# Patient Record
Sex: FEMALE | Race: BLACK OR AFRICAN AMERICAN | NOT HISPANIC OR LATINO | Employment: FULL TIME | URBAN - METROPOLITAN AREA
[De-identification: names, ages, dates, MRNs, and addresses within clinical notes are randomized per-mention and may not be internally consistent; named-entity substitution may affect disease eponyms.]

---

## 2021-10-05 ENCOUNTER — HOSPITAL ENCOUNTER (EMERGENCY)
Facility: HOSPITAL | Age: 56
Discharge: ANOTHER HOSPITAL | End: 2021-10-05
Attending: EMERGENCY MEDICINE
Payer: COMMERCIAL

## 2021-10-05 ENCOUNTER — HOSPITAL ENCOUNTER (INPATIENT)
Facility: HOSPITAL | Age: 56
LOS: 7 days | Discharge: HOME HEALTH | DRG: 871 | End: 2021-10-12
Attending: INTERNAL MEDICINE | Admitting: INTERNAL MEDICINE
Payer: COMMERCIAL

## 2021-10-05 VITALS
HEART RATE: 111 BPM | SYSTOLIC BLOOD PRESSURE: 104 MMHG | OXYGEN SATURATION: 96 % | RESPIRATION RATE: 16 BRPM | DIASTOLIC BLOOD PRESSURE: 58 MMHG | BODY MASS INDEX: 29.73 KG/M2 | TEMPERATURE: 99 F | WEIGHT: 172 LBS

## 2021-10-05 DIAGNOSIS — E87.6 HYPOKALEMIA: ICD-10-CM

## 2021-10-05 DIAGNOSIS — A41.9 SEPSIS: ICD-10-CM

## 2021-10-05 DIAGNOSIS — D69.6 THROMBOCYTOPENIA: ICD-10-CM

## 2021-10-05 DIAGNOSIS — R07.9 CHEST PAIN: ICD-10-CM

## 2021-10-05 DIAGNOSIS — R16.0 HEPATOMEGALY: ICD-10-CM

## 2021-10-05 DIAGNOSIS — E80.6 HYPERBILIRUBINEMIA: ICD-10-CM

## 2021-10-05 DIAGNOSIS — R39.89 PNEUMATURIA: ICD-10-CM

## 2021-10-05 DIAGNOSIS — D72.829 LEUKOCYTOSIS, UNSPECIFIED TYPE: ICD-10-CM

## 2021-10-05 DIAGNOSIS — D64.9 NORMOCYTIC ANEMIA: ICD-10-CM

## 2021-10-05 DIAGNOSIS — E87.20 LACTIC ACIDOSIS: ICD-10-CM

## 2021-10-05 DIAGNOSIS — R65.20 SEPSIS DUE TO ESCHERICHIA COLI WITH ACUTE RENAL FAILURE AND TUBULAR NECROSIS WITHOUT SEPTIC SHOCK: Primary | ICD-10-CM

## 2021-10-05 DIAGNOSIS — R19.5 HEME + STOOL: ICD-10-CM

## 2021-10-05 DIAGNOSIS — D64.9 ANEMIA, UNSPECIFIED TYPE: ICD-10-CM

## 2021-10-05 DIAGNOSIS — N17.9 AKI (ACUTE KIDNEY INJURY): ICD-10-CM

## 2021-10-05 DIAGNOSIS — I50.9 CHF (CONGESTIVE HEART FAILURE): ICD-10-CM

## 2021-10-05 DIAGNOSIS — Z72.0 TOBACCO ABUSE: ICD-10-CM

## 2021-10-05 DIAGNOSIS — I50.32 CHRONIC HEART FAILURE WITH PRESERVED EJECTION FRACTION: ICD-10-CM

## 2021-10-05 DIAGNOSIS — N17.0 SEPSIS DUE TO ESCHERICHIA COLI WITH ACUTE RENAL FAILURE AND TUBULAR NECROSIS WITHOUT SEPTIC SHOCK: Primary | ICD-10-CM

## 2021-10-05 DIAGNOSIS — I10 ESSENTIAL HYPERTENSION: ICD-10-CM

## 2021-10-05 DIAGNOSIS — I50.9 CONGESTIVE HEART FAILURE, UNSPECIFIED HF CHRONICITY, UNSPECIFIED HEART FAILURE TYPE: ICD-10-CM

## 2021-10-05 DIAGNOSIS — R74.8 ELEVATED ALKALINE PHOSPHATASE LEVEL: ICD-10-CM

## 2021-10-05 DIAGNOSIS — R06.02 SHORTNESS OF BREATH: ICD-10-CM

## 2021-10-05 DIAGNOSIS — N28.81 NEPHROMEGALY: ICD-10-CM

## 2021-10-05 DIAGNOSIS — A41.51 SEPSIS DUE TO ESCHERICHIA COLI WITH ACUTE RENAL FAILURE AND TUBULAR NECROSIS WITHOUT SEPTIC SHOCK: Primary | ICD-10-CM

## 2021-10-05 DIAGNOSIS — A41.9 SEPSIS, DUE TO UNSPECIFIED ORGANISM, UNSPECIFIED WHETHER ACUTE ORGAN DYSFUNCTION PRESENT: ICD-10-CM

## 2021-10-05 DIAGNOSIS — K57.92 DIVERTICULITIS: ICD-10-CM

## 2021-10-05 DIAGNOSIS — N17.0 ATN (ACUTE TUBULAR NECROSIS): ICD-10-CM

## 2021-10-05 DIAGNOSIS — N17.9 ACUTE KIDNEY INJURY: Primary | ICD-10-CM

## 2021-10-05 DIAGNOSIS — E66.09 CLASS 1 OBESITY DUE TO EXCESS CALORIES WITHOUT SERIOUS COMORBIDITY WITH BODY MASS INDEX (BMI) OF 30.0 TO 30.9 IN ADULT: ICD-10-CM

## 2021-10-05 DIAGNOSIS — R79.89 ELEVATED PROCALCITONIN: ICD-10-CM

## 2021-10-05 DIAGNOSIS — R06.02 SOB (SHORTNESS OF BREATH): ICD-10-CM

## 2021-10-05 PROBLEM — Z20.822 COVID-19 RULED OUT: Status: ACTIVE | Noted: 2021-10-05

## 2021-10-05 PROBLEM — R17 TOTAL BILIRUBIN, ELEVATED: Status: ACTIVE | Noted: 2021-10-05

## 2021-10-05 LAB
25(OH)D3+25(OH)D2 SERPL-MCNC: 19 NG/ML (ref 30–96)
ALBUMIN SERPL BCP-MCNC: 1.9 G/DL (ref 3.5–5.2)
ALBUMIN SERPL BCP-MCNC: 3.1 G/DL (ref 3.5–5.2)
ALP SERPL-CCNC: 179 U/L (ref 55–135)
ALP SERPL-CCNC: 185 U/L (ref 38–126)
ALT SERPL W/O P-5'-P-CCNC: 22 U/L (ref 10–44)
ALT SERPL W/O P-5'-P-CCNC: 25 U/L (ref 10–44)
ANION GAP SERPL CALC-SCNC: 16 MMOL/L (ref 8–16)
ANION GAP SERPL CALC-SCNC: 20 MMOL/L (ref 8–16)
ANISOCYTOSIS BLD QL SMEAR: SLIGHT
ANISOCYTOSIS BLD QL SMEAR: SLIGHT
APTT BLDCRRT: 33.2 SEC (ref 21–32)
AST SERPL-CCNC: 25 U/L (ref 10–40)
AST SERPL-CCNC: 44 U/L (ref 15–46)
BASOPHILS # BLD AUTO: ABNORMAL K/UL (ref 0–0.2)
BASOPHILS NFR BLD: 0 % (ref 0–1.9)
BASOPHILS NFR BLD: 0 % (ref 0–1.9)
BILIRUB SERPL-MCNC: 6.4 MG/DL (ref 0.1–1)
BILIRUB SERPL-MCNC: 7.9 MG/DL (ref 0.1–1)
BNP SERPL-MCNC: 2116 PG/ML (ref 0–99)
BUN SERPL-MCNC: 50 MG/DL (ref 6–20)
CA-I BLDV-SCNC: 0.93 MMOL/L (ref 1.06–1.42)
CALCIUM SERPL-MCNC: 7.8 MG/DL (ref 8.7–10.5)
CALCIUM SERPL-MCNC: 8.4 MG/DL (ref 8.7–10.5)
CHLORIDE SERPL-SCNC: 96 MMOL/L (ref 95–110)
CHLORIDE SERPL-SCNC: 99 MMOL/L (ref 95–110)
CK SERPL-CCNC: 26 U/L (ref 20–180)
CO2 SERPL-SCNC: 20 MMOL/L (ref 23–29)
CO2 SERPL-SCNC: 25 MMOL/L (ref 23–29)
CREAT SERPL-MCNC: 3.4 MG/DL (ref 0.5–1.4)
CREAT SERPL-MCNC: 3.77 MG/DL (ref 0.5–1.4)
CRP SERPL-MCNC: 468.8 MG/L (ref 0–8.2)
D DIMER PPP IA.FEU-MCNC: >33 MG/L FEU
DIFFERENTIAL METHOD: ABNORMAL
DIFFERENTIAL METHOD: ABNORMAL
EOSINOPHIL # BLD AUTO: ABNORMAL K/UL (ref 0–0.5)
EOSINOPHIL NFR BLD: 0 % (ref 0–8)
EOSINOPHIL NFR BLD: 0 % (ref 0–8)
ERYTHROCYTE [DISTWIDTH] IN BLOOD BY AUTOMATED COUNT: 14.7 % (ref 11.5–14.5)
ERYTHROCYTE [DISTWIDTH] IN BLOOD BY AUTOMATED COUNT: 15.3 % (ref 11.5–14.5)
EST. GFR  (AFRICAN AMERICAN): 14.5 ML/MIN/1.73 M^2
EST. GFR  (AFRICAN AMERICAN): 16.5 ML/MIN/1.73 M^2
EST. GFR  (NON AFRICAN AMERICAN): 12.6 ML/MIN/1.73 M^2
EST. GFR  (NON AFRICAN AMERICAN): 14.3 ML/MIN/1.73 M^2
FERRITIN SERPL-MCNC: 1413 NG/ML (ref 20–300)
FOLATE SERPL-MCNC: 5.1 NG/ML (ref 4–24)
GLUCOSE SERPL-MCNC: 53 MG/DL (ref 70–110)
GLUCOSE SERPL-MCNC: 72 MG/DL (ref 70–110)
HAPTOGLOB SERPL-MCNC: 372 MG/DL (ref 30–250)
HCT VFR BLD AUTO: 22.4 % (ref 37–48.5)
HCT VFR BLD AUTO: 23.5 % (ref 37–48.5)
HGB BLD-MCNC: 7.2 G/DL (ref 12–16)
HGB BLD-MCNC: 7.7 G/DL (ref 12–16)
HYPOCHROMIA BLD QL SMEAR: ABNORMAL
IMM GRANULOCYTES # BLD AUTO: ABNORMAL K/UL (ref 0–0.04)
IMM GRANULOCYTES # BLD AUTO: ABNORMAL K/UL (ref 0–0.04)
IMM GRANULOCYTES NFR BLD AUTO: ABNORMAL % (ref 0–0.5)
IMM GRANULOCYTES NFR BLD AUTO: ABNORMAL % (ref 0–0.5)
INFLUENZA A, MOLECULAR: NEGATIVE
INFLUENZA B, MOLECULAR: NEGATIVE
INR PPP: 1.5 (ref 0.8–1.2)
IRON SERPL-MCNC: 11 UG/DL (ref 30–160)
LACTATE SERPL-SCNC: 1.7 MMOL/L (ref 0.5–2.2)
LACTATE SERPL-SCNC: 3 MMOL/L (ref 0.5–2.2)
LDH SERPL L TO P-CCNC: 377 U/L (ref 110–260)
LYMPHOCYTES # BLD AUTO: ABNORMAL K/UL (ref 1–4.8)
LYMPHOCYTES NFR BLD: 11 % (ref 18–48)
LYMPHOCYTES NFR BLD: 4 % (ref 18–48)
MAGNESIUM SERPL-MCNC: 2.2 MG/DL (ref 1.6–2.6)
MCH RBC QN AUTO: 27 PG (ref 27–31)
MCH RBC QN AUTO: 27.4 PG (ref 27–31)
MCHC RBC AUTO-ENTMCNC: 32.1 G/DL (ref 32–36)
MCHC RBC AUTO-ENTMCNC: 32.8 G/DL (ref 32–36)
MCV RBC AUTO: 84 FL (ref 82–98)
MCV RBC AUTO: 84 FL (ref 82–98)
METAMYELOCYTES NFR BLD MANUAL: 1 %
METAMYELOCYTES NFR BLD MANUAL: 2 %
MONOCYTES # BLD AUTO: ABNORMAL K/UL (ref 0.3–1)
MONOCYTES NFR BLD: 4 % (ref 4–15)
MONOCYTES NFR BLD: 5 % (ref 4–15)
MYELOCYTES NFR BLD MANUAL: 1 %
NEUTROPHILS NFR BLD: 79 % (ref 38–73)
NEUTROPHILS NFR BLD: 83 % (ref 38–73)
NEUTS BAND NFR BLD MANUAL: 2 %
NEUTS BAND NFR BLD MANUAL: 8 %
NRBC BLD-RTO: 0 /100 WBC
NRBC BLD-RTO: 0 /100 WBC
NT-PROBNP SERPL-MCNC: ABNORMAL PG/ML (ref 5–900)
OVALOCYTES BLD QL SMEAR: ABNORMAL
PHOSPHATE SERPL-MCNC: 3.5 MG/DL (ref 2.7–4.5)
PLATELET # BLD AUTO: 142 K/UL (ref 150–450)
PLATELET # BLD AUTO: 145 K/UL (ref 150–450)
PLATELET BLD QL SMEAR: ABNORMAL
PLATELET BLD QL SMEAR: ABNORMAL
PMV BLD AUTO: 11.3 FL (ref 9.2–12.9)
PMV BLD AUTO: 11.3 FL (ref 9.2–12.9)
POIKILOCYTOSIS BLD QL SMEAR: SLIGHT
POLYCHROMASIA BLD QL SMEAR: ABNORMAL
POTASSIUM SERPL-SCNC: 3 MMOL/L (ref 3.5–5.1)
POTASSIUM SERPL-SCNC: 3.2 MMOL/L (ref 3.5–5.1)
PROCALCITONIN SERPL IA-MCNC: 283.85 NG/ML
PROT SERPL-MCNC: 6.9 G/DL (ref 6–8.4)
PROT SERPL-MCNC: 7.3 G/DL (ref 6–8.4)
PROTHROMBIN TIME: 16.2 SEC (ref 9–12.5)
RBC # BLD AUTO: 2.67 M/UL (ref 4–5.4)
RBC # BLD AUTO: 2.81 M/UL (ref 4–5.4)
RETICS/RBC NFR AUTO: 1.1 % (ref 0.5–2.5)
SARS-COV-2 RDRP RESP QL NAA+PROBE: NEGATIVE
SARS-COV-2 RDRP RESP QL NAA+PROBE: NEGATIVE
SARS-COV-2 RNA RESP QL NAA+PROBE: NOT DETECTED
SATURATED IRON: 5 % (ref 20–50)
SODIUM SERPL-SCNC: 136 MMOL/L (ref 136–145)
SODIUM SERPL-SCNC: 140 MMOL/L (ref 136–145)
SPECIMEN SOURCE: NORMAL
TOTAL IRON BINDING CAPACITY: 231 UG/DL (ref 250–450)
TOXIC GRANULES BLD QL SMEAR: PRESENT
TRANSFERRIN SERPL-MCNC: 156 MG/DL (ref 200–375)
TROPONIN I SERPL DL<=0.01 NG/ML-MCNC: 0.03 NG/ML (ref 0–0.03)
TROPONIN I SERPL-MCNC: 0.02 NG/ML (ref 0.01–0.03)
TROPONIN I SERPL-MCNC: 0.03 NG/ML (ref 0.01–0.03)
TSH SERPL DL<=0.005 MIU/L-ACNC: 0.95 UIU/ML (ref 0.4–4)
UUN UR-MCNC: 46 MG/DL (ref 7–17)
VIT B12 SERPL-MCNC: >2000 PG/ML (ref 210–950)
WBC # BLD AUTO: 28.64 K/UL (ref 3.9–12.7)
WBC # BLD AUTO: 28.96 K/UL (ref 3.9–12.7)
WBC TOXIC VACUOLES BLD QL SMEAR: PRESENT

## 2021-10-05 PROCEDURE — 25000003 PHARM REV CODE 250: Performed by: INTERNAL MEDICINE

## 2021-10-05 PROCEDURE — 82330 ASSAY OF CALCIUM: CPT | Performed by: INTERNAL MEDICINE

## 2021-10-05 PROCEDURE — U0005 INFEC AGEN DETEC AMPLI PROBE: HCPCS | Performed by: INTERNAL MEDICINE

## 2021-10-05 PROCEDURE — 25000003 PHARM REV CODE 250: Performed by: FAMILY MEDICINE

## 2021-10-05 PROCEDURE — U0003 INFECTIOUS AGENT DETECTION BY NUCLEIC ACID (DNA OR RNA); SEVERE ACUTE RESPIRATORY SYNDROME CORONAVIRUS 2 (SARS-COV-2) (CORONAVIRUS DISEASE [COVID-19]), AMPLIFIED PROBE TECHNIQUE, MAKING USE OF HIGH THROUGHPUT TECHNOLOGIES AS DESCRIBED BY CMS-2020-01-R: HCPCS | Performed by: INTERNAL MEDICINE

## 2021-10-05 PROCEDURE — 83615 LACTATE (LD) (LDH) ENZYME: CPT | Performed by: INTERNAL MEDICINE

## 2021-10-05 PROCEDURE — 85027 COMPLETE CBC AUTOMATED: CPT | Mod: ER | Performed by: PHYSICIAN ASSISTANT

## 2021-10-05 PROCEDURE — 99285 EMERGENCY DEPT VISIT HI MDM: CPT | Mod: 25,ER

## 2021-10-05 PROCEDURE — 84443 ASSAY THYROID STIM HORMONE: CPT | Performed by: INTERNAL MEDICINE

## 2021-10-05 PROCEDURE — 27000207 HC ISOLATION

## 2021-10-05 PROCEDURE — U0002 COVID-19 LAB TEST NON-CDC: HCPCS | Mod: 91 | Performed by: INTERNAL MEDICINE

## 2021-10-05 PROCEDURE — 84484 ASSAY OF TROPONIN QUANT: CPT | Mod: 91,ER | Performed by: PHYSICIAN ASSISTANT

## 2021-10-05 PROCEDURE — 93010 ELECTROCARDIOGRAM REPORT: CPT | Mod: ,,, | Performed by: INTERNAL MEDICINE

## 2021-10-05 PROCEDURE — 80053 COMPREHEN METABOLIC PANEL: CPT | Mod: ER | Performed by: PHYSICIAN ASSISTANT

## 2021-10-05 PROCEDURE — 25000242 PHARM REV CODE 250 ALT 637 W/ HCPCS: Performed by: INTERNAL MEDICINE

## 2021-10-05 PROCEDURE — 87077 CULTURE AEROBIC IDENTIFY: CPT | Mod: ER | Performed by: PHYSICIAN ASSISTANT

## 2021-10-05 PROCEDURE — 83735 ASSAY OF MAGNESIUM: CPT | Performed by: INTERNAL MEDICINE

## 2021-10-05 PROCEDURE — 93010 EKG 12-LEAD: ICD-10-PCS | Mod: ,,, | Performed by: INTERNAL MEDICINE

## 2021-10-05 PROCEDURE — 85045 AUTOMATED RETICULOCYTE COUNT: CPT | Performed by: INTERNAL MEDICINE

## 2021-10-05 PROCEDURE — 93005 ELECTROCARDIOGRAM TRACING: CPT | Mod: ER

## 2021-10-05 PROCEDURE — 83010 ASSAY OF HAPTOGLOBIN QUANT: CPT | Performed by: INTERNAL MEDICINE

## 2021-10-05 PROCEDURE — 63600175 PHARM REV CODE 636 W HCPCS: Performed by: INTERNAL MEDICINE

## 2021-10-05 PROCEDURE — 83605 ASSAY OF LACTIC ACID: CPT | Mod: ER | Performed by: PHYSICIAN ASSISTANT

## 2021-10-05 PROCEDURE — U0002 COVID-19 LAB TEST NON-CDC: HCPCS | Mod: ER | Performed by: EMERGENCY MEDICINE

## 2021-10-05 PROCEDURE — 80074 ACUTE HEPATITIS PANEL: CPT | Performed by: INTERNAL MEDICINE

## 2021-10-05 PROCEDURE — 99900035 HC TECH TIME PER 15 MIN (STAT): Mod: ER

## 2021-10-05 PROCEDURE — 94761 N-INVAS EAR/PLS OXIMETRY MLT: CPT

## 2021-10-05 PROCEDURE — 82728 ASSAY OF FERRITIN: CPT | Performed by: INTERNAL MEDICINE

## 2021-10-05 PROCEDURE — 25000003 PHARM REV CODE 250: Mod: ER | Performed by: PHYSICIAN ASSISTANT

## 2021-10-05 PROCEDURE — 85730 THROMBOPLASTIN TIME PARTIAL: CPT | Performed by: INTERNAL MEDICINE

## 2021-10-05 PROCEDURE — 82746 ASSAY OF FOLIC ACID SERUM: CPT | Performed by: INTERNAL MEDICINE

## 2021-10-05 PROCEDURE — 99223 1ST HOSP IP/OBS HIGH 75: CPT | Mod: ,,, | Performed by: INTERNAL MEDICINE

## 2021-10-05 PROCEDURE — 83880 ASSAY OF NATRIURETIC PEPTIDE: CPT | Mod: ER | Performed by: PHYSICIAN ASSISTANT

## 2021-10-05 PROCEDURE — 85007 BL SMEAR W/DIFF WBC COUNT: CPT | Mod: 91 | Performed by: INTERNAL MEDICINE

## 2021-10-05 PROCEDURE — 96365 THER/PROPH/DIAG IV INF INIT: CPT | Mod: ER

## 2021-10-05 PROCEDURE — 85610 PROTHROMBIN TIME: CPT | Performed by: INTERNAL MEDICINE

## 2021-10-05 PROCEDURE — 84100 ASSAY OF PHOSPHORUS: CPT | Performed by: INTERNAL MEDICINE

## 2021-10-05 PROCEDURE — 36415 COLL VENOUS BLD VENIPUNCTURE: CPT | Performed by: INTERNAL MEDICINE

## 2021-10-05 PROCEDURE — 84484 ASSAY OF TROPONIN QUANT: CPT | Mod: 91 | Performed by: INTERNAL MEDICINE

## 2021-10-05 PROCEDURE — 84145 PROCALCITONIN (PCT): CPT | Performed by: INTERNAL MEDICINE

## 2021-10-05 PROCEDURE — 83880 ASSAY OF NATRIURETIC PEPTIDE: CPT | Mod: 91 | Performed by: INTERNAL MEDICINE

## 2021-10-05 PROCEDURE — 87502 INFLUENZA DNA AMP PROBE: CPT | Performed by: INTERNAL MEDICINE

## 2021-10-05 PROCEDURE — 85007 BL SMEAR W/DIFF WBC COUNT: CPT | Mod: ER | Performed by: PHYSICIAN ASSISTANT

## 2021-10-05 PROCEDURE — 82306 VITAMIN D 25 HYDROXY: CPT | Performed by: INTERNAL MEDICINE

## 2021-10-05 PROCEDURE — 82607 VITAMIN B-12: CPT | Performed by: INTERNAL MEDICINE

## 2021-10-05 PROCEDURE — 63600175 PHARM REV CODE 636 W HCPCS: Mod: ER | Performed by: PHYSICIAN ASSISTANT

## 2021-10-05 PROCEDURE — 85027 COMPLETE CBC AUTOMATED: CPT | Mod: 91 | Performed by: INTERNAL MEDICINE

## 2021-10-05 PROCEDURE — 96375 TX/PRO/DX INJ NEW DRUG ADDON: CPT | Mod: ER

## 2021-10-05 PROCEDURE — 85379 FIBRIN DEGRADATION QUANT: CPT | Mod: ER | Performed by: EMERGENCY MEDICINE

## 2021-10-05 PROCEDURE — 84466 ASSAY OF TRANSFERRIN: CPT | Performed by: INTERNAL MEDICINE

## 2021-10-05 PROCEDURE — 86140 C-REACTIVE PROTEIN: CPT | Performed by: INTERNAL MEDICINE

## 2021-10-05 PROCEDURE — 87040 BLOOD CULTURE FOR BACTERIA: CPT | Mod: ER | Performed by: PHYSICIAN ASSISTANT

## 2021-10-05 PROCEDURE — 99223 PR INITIAL HOSPITAL CARE,LEVL III: ICD-10-PCS | Mod: ,,, | Performed by: INTERNAL MEDICINE

## 2021-10-05 PROCEDURE — 93005 ELECTROCARDIOGRAM TRACING: CPT

## 2021-10-05 PROCEDURE — 87186 SC STD MICRODIL/AGAR DIL: CPT | Mod: ER | Performed by: PHYSICIAN ASSISTANT

## 2021-10-05 PROCEDURE — 83605 ASSAY OF LACTIC ACID: CPT | Mod: 91 | Performed by: INTERNAL MEDICINE

## 2021-10-05 PROCEDURE — 82550 ASSAY OF CK (CPK): CPT | Performed by: INTERNAL MEDICINE

## 2021-10-05 PROCEDURE — 80053 COMPREHEN METABOLIC PANEL: CPT | Mod: 91 | Performed by: INTERNAL MEDICINE

## 2021-10-05 PROCEDURE — 20600001 HC STEP DOWN PRIVATE ROOM

## 2021-10-05 PROCEDURE — 94640 AIRWAY INHALATION TREATMENT: CPT

## 2021-10-05 RX ORDER — IBUPROFEN 200 MG
16 TABLET ORAL
Status: DISCONTINUED | OUTPATIENT
Start: 2021-10-05 | End: 2021-10-12 | Stop reason: HOSPADM

## 2021-10-05 RX ORDER — MUPIROCIN 20 MG/G
OINTMENT TOPICAL 2 TIMES DAILY
Status: DISPENSED | OUTPATIENT
Start: 2021-10-05 | End: 2021-10-10

## 2021-10-05 RX ORDER — SODIUM CHLORIDE 0.9 % (FLUSH) 0.9 %
5 SYRINGE (ML) INJECTION
Status: DISCONTINUED | OUTPATIENT
Start: 2021-10-05 | End: 2021-10-12 | Stop reason: HOSPADM

## 2021-10-05 RX ORDER — MORPHINE SULFATE 4 MG/ML
2 INJECTION, SOLUTION INTRAMUSCULAR; INTRAVENOUS
Status: COMPLETED | OUTPATIENT
Start: 2021-10-05 | End: 2021-10-05

## 2021-10-05 RX ORDER — TIZANIDINE 4 MG/1
4 TABLET ORAL EVERY 8 HOURS PRN
Status: DISCONTINUED | OUTPATIENT
Start: 2021-10-05 | End: 2021-10-09

## 2021-10-05 RX ORDER — CALCIUM CARBONATE 200(500)MG
1000 TABLET,CHEWABLE ORAL 2 TIMES DAILY
Status: DISCONTINUED | OUTPATIENT
Start: 2021-10-05 | End: 2021-10-12 | Stop reason: HOSPADM

## 2021-10-05 RX ORDER — GLUCAGON 1 MG
1 KIT INJECTION
Status: DISCONTINUED | OUTPATIENT
Start: 2021-10-05 | End: 2021-10-12 | Stop reason: HOSPADM

## 2021-10-05 RX ORDER — SODIUM CHLORIDE, SODIUM LACTATE, POTASSIUM CHLORIDE, CALCIUM CHLORIDE 600; 310; 30; 20 MG/100ML; MG/100ML; MG/100ML; MG/100ML
INJECTION, SOLUTION INTRAVENOUS CONTINUOUS
Status: DISCONTINUED | OUTPATIENT
Start: 2021-10-05 | End: 2021-10-05

## 2021-10-05 RX ORDER — ASCORBIC ACID 500 MG
500 TABLET ORAL 2 TIMES DAILY
Status: DISCONTINUED | OUTPATIENT
Start: 2021-10-05 | End: 2021-10-12 | Stop reason: HOSPADM

## 2021-10-05 RX ORDER — ACETAMINOPHEN 325 MG/1
650 TABLET ORAL EVERY 4 HOURS PRN
Status: DISCONTINUED | OUTPATIENT
Start: 2021-10-05 | End: 2021-10-12 | Stop reason: HOSPADM

## 2021-10-05 RX ORDER — ALBUTEROL SULFATE 90 UG/1
2 AEROSOL, METERED RESPIRATORY (INHALATION) EVERY 6 HOURS
Status: DISCONTINUED | OUTPATIENT
Start: 2021-10-05 | End: 2021-10-11

## 2021-10-05 RX ORDER — TALC
6 POWDER (GRAM) TOPICAL NIGHTLY PRN
Status: DISCONTINUED | OUTPATIENT
Start: 2021-10-05 | End: 2021-10-12 | Stop reason: HOSPADM

## 2021-10-05 RX ORDER — POTASSIUM CHLORIDE 20 MEQ/1
40 TABLET, EXTENDED RELEASE ORAL ONCE
Status: COMPLETED | OUTPATIENT
Start: 2021-10-05 | End: 2021-10-05

## 2021-10-05 RX ORDER — ONDANSETRON 2 MG/ML
4 INJECTION INTRAMUSCULAR; INTRAVENOUS EVERY 8 HOURS PRN
Status: DISCONTINUED | OUTPATIENT
Start: 2021-10-05 | End: 2021-10-12 | Stop reason: HOSPADM

## 2021-10-05 RX ORDER — POLYETHYLENE GLYCOL 3350 17 G/17G
17 POWDER, FOR SOLUTION ORAL 2 TIMES DAILY PRN
Status: DISCONTINUED | OUTPATIENT
Start: 2021-10-05 | End: 2021-10-12 | Stop reason: HOSPADM

## 2021-10-05 RX ORDER — ONDANSETRON 2 MG/ML
4 INJECTION INTRAMUSCULAR; INTRAVENOUS
Status: COMPLETED | OUTPATIENT
Start: 2021-10-05 | End: 2021-10-05

## 2021-10-05 RX ORDER — IBUPROFEN 200 MG
24 TABLET ORAL
Status: DISCONTINUED | OUTPATIENT
Start: 2021-10-05 | End: 2021-10-12 | Stop reason: HOSPADM

## 2021-10-05 RX ORDER — GUAIFENESIN/DEXTROMETHORPHAN 100-10MG/5
10 SYRUP ORAL EVERY 4 HOURS PRN
Status: DISCONTINUED | OUTPATIENT
Start: 2021-10-05 | End: 2021-10-12 | Stop reason: HOSPADM

## 2021-10-05 RX ADMIN — SODIUM CHLORIDE, SODIUM LACTATE, POTASSIUM CHLORIDE, AND CALCIUM CHLORIDE 1000 ML: .6; .31; .03; .02 INJECTION, SOLUTION INTRAVENOUS at 05:10

## 2021-10-05 RX ADMIN — PIPERACILLIN AND TAZOBACTAM 4.5 G: 4; .5 INJECTION, POWDER, LYOPHILIZED, FOR SOLUTION INTRAVENOUS; PARENTERAL at 02:10

## 2021-10-05 RX ADMIN — TIZANIDINE 4 MG: 4 TABLET ORAL at 09:10

## 2021-10-05 RX ADMIN — OXYCODONE HYDROCHLORIDE AND ACETAMINOPHEN 500 MG: 500 TABLET ORAL at 09:10

## 2021-10-05 RX ADMIN — POTASSIUM CHLORIDE 40 MEQ: 1500 TABLET, EXTENDED RELEASE ORAL at 05:10

## 2021-10-05 RX ADMIN — ALBUTEROL SULFATE 2 PUFF: 108 INHALANT RESPIRATORY (INHALATION) at 09:10

## 2021-10-05 RX ADMIN — ACETAMINOPHEN 650 MG: 325 TABLET ORAL at 06:10

## 2021-10-05 RX ADMIN — MUPIROCIN 1 G: 20 OINTMENT TOPICAL at 09:10

## 2021-10-05 RX ADMIN — MORPHINE SULFATE 2 MG: 4 INJECTION INTRAVENOUS at 01:10

## 2021-10-05 RX ADMIN — CEFTRIAXONE 1 G: 1 INJECTION, SOLUTION INTRAVENOUS at 10:10

## 2021-10-05 RX ADMIN — CALCIUM CARBONATE (ANTACID) CHEW TAB 500 MG 1000 MG: 500 CHEW TAB at 09:10

## 2021-10-05 RX ADMIN — ONDANSETRON 4 MG: 2 INJECTION INTRAMUSCULAR; INTRAVENOUS at 01:10

## 2021-10-06 PROBLEM — Z20.822 COVID-19 RULED OUT: Status: RESOLVED | Noted: 2021-10-05 | Resolved: 2021-10-06

## 2021-10-06 LAB
ABO + RH BLD: NORMAL
ALBUMIN SERPL BCP-MCNC: 1.4 G/DL (ref 3.5–5.2)
ALBUMIN SERPL BCP-MCNC: 1.4 G/DL (ref 3.5–5.2)
ALP SERPL-CCNC: 137 U/L (ref 55–135)
ALP SERPL-CCNC: 159 U/L (ref 55–135)
ALT SERPL W/O P-5'-P-CCNC: 17 U/L (ref 10–44)
ALT SERPL W/O P-5'-P-CCNC: 18 U/L (ref 10–44)
ANION GAP SERPL CALC-SCNC: 16 MMOL/L (ref 8–16)
ANION GAP SERPL CALC-SCNC: 17 MMOL/L (ref 8–16)
ASCENDING AORTA: 2.47 CM
AST SERPL-CCNC: 20 U/L (ref 10–40)
AST SERPL-CCNC: 22 U/L (ref 10–40)
AV INDEX (PROSTH): 0.69
AV MEAN GRADIENT: 8 MMHG
AV PEAK GRADIENT: 12 MMHG
AV VALVE AREA: 2.1 CM2
AV VELOCITY RATIO: 0.65
BACTERIA #/AREA URNS AUTO: ABNORMAL /HPF
BILIRUB DIRECT SERPL-MCNC: 6.6 MG/DL (ref 0.1–0.3)
BILIRUB SERPL-MCNC: 10.6 MG/DL (ref 0.1–1)
BILIRUB SERPL-MCNC: 8.2 MG/DL (ref 0.1–1)
BILIRUB UR QL STRIP: NEGATIVE
BLD GP AB SCN CELLS X3 SERPL QL: NORMAL
BLD PROD TYP BPU: NORMAL
BLOOD UNIT EXPIRATION DATE: NORMAL
BLOOD UNIT TYPE CODE: 6200
BLOOD UNIT TYPE: NORMAL
BSA FOR ECHO PROCEDURE: 1.92 M2
BUN SERPL-MCNC: 62 MG/DL (ref 6–20)
BUN SERPL-MCNC: 63 MG/DL (ref 6–20)
C3 SERPL-MCNC: 102 MG/DL (ref 50–180)
C4 SERPL-MCNC: 33 MG/DL (ref 11–44)
CA-I BLDV-SCNC: 1.03 MMOL/L (ref 1.06–1.42)
CALCIUM SERPL-MCNC: 7.9 MG/DL (ref 8.7–10.5)
CALCIUM SERPL-MCNC: 8.2 MG/DL (ref 8.7–10.5)
CHLORIDE SERPL-SCNC: 101 MMOL/L (ref 95–110)
CHLORIDE SERPL-SCNC: 103 MMOL/L (ref 95–110)
CK SERPL-CCNC: 18 U/L (ref 20–180)
CLARITY UR REFRACT.AUTO: ABNORMAL
CO2 SERPL-SCNC: 20 MMOL/L (ref 23–29)
CO2 SERPL-SCNC: 20 MMOL/L (ref 23–29)
CODING SYSTEM: NORMAL
COLOR UR AUTO: YELLOW
CREAT SERPL-MCNC: 3.5 MG/DL (ref 0.5–1.4)
CREAT SERPL-MCNC: 3.7 MG/DL (ref 0.5–1.4)
CREAT UR-MCNC: 82 MG/DL (ref 15–325)
CREAT UR-MCNC: 82 MG/DL (ref 15–325)
CV ECHO LV RWT: 0.43 CM
DAT IGG-SP REAG RBC-IMP: NORMAL
DISPENSE STATUS: NORMAL
DOP CALC AO PEAK VEL: 1.75 M/S
DOP CALC AO VTI: 41.48 CM
DOP CALC LVOT AREA: 3 CM2
DOP CALC LVOT DIAMETER: 1.97 CM
DOP CALC LVOT PEAK VEL: 1.13 M/S
DOP CALC LVOT STROKE VOLUME: 87.01 CM3
DOP CALCLVOT PEAK VEL VTI: 28.56 CM
E WAVE DECELERATION TIME: 125.71 MSEC
E/A RATIO: 1.19
E/E' RATIO: 23 M/S
ECHO LV POSTERIOR WALL: 0.98 CM (ref 0.6–1.1)
EJECTION FRACTION: 55 %
ERYTHROCYTE [DISTWIDTH] IN BLOOD BY AUTOMATED COUNT: 15.2 % (ref 11.5–14.5)
EST. GFR  (AFRICAN AMERICAN): 14.9 ML/MIN/1.73 M^2
EST. GFR  (AFRICAN AMERICAN): 15.9 ML/MIN/1.73 M^2
EST. GFR  (NON AFRICAN AMERICAN): 12.9 ML/MIN/1.73 M^2
EST. GFR  (NON AFRICAN AMERICAN): 13.8 ML/MIN/1.73 M^2
FRACTIONAL SHORTENING: 29 % (ref 28–44)
GLUCOSE SERPL-MCNC: 113 MG/DL (ref 70–110)
GLUCOSE SERPL-MCNC: 56 MG/DL (ref 70–110)
GLUCOSE UR QL STRIP: NEGATIVE
HAV IGM SERPL QL IA: NEGATIVE
HBV CORE IGM SERPL QL IA: NEGATIVE
HBV SURFACE AG SERPL QL IA: NEGATIVE
HCT VFR BLD AUTO: 20.4 % (ref 37–48.5)
HCT VFR BLD AUTO: 21.7 % (ref 37–48.5)
HCV AB SERPL QL IA: NEGATIVE
HGB BLD-MCNC: 6.5 G/DL (ref 12–16)
HGB BLD-MCNC: 7 G/DL (ref 12–16)
HGB UR QL STRIP: ABNORMAL
HYALINE CASTS UR QL AUTO: 0 /LPF
INTERVENTRICULAR SEPTUM: 1.12 CM (ref 0.6–1.1)
KETONES UR QL STRIP: NEGATIVE
LA MAJOR: 5.1 CM
LA MINOR: 4.69 CM
LA WIDTH: 4.8 CM
LEFT ATRIUM SIZE: 3.92 CM
LEFT ATRIUM VOLUME INDEX MOD: 41.5 ML/M2
LEFT ATRIUM VOLUME INDEX: 41.8 ML/M2
LEFT ATRIUM VOLUME MOD: 77.61 CM3
LEFT ATRIUM VOLUME: 78.15 CM3
LEFT INTERNAL DIMENSION IN SYSTOLE: 3.25 CM (ref 2.1–4)
LEFT VENTRICLE DIASTOLIC VOLUME INDEX: 50.61 ML/M2
LEFT VENTRICLE DIASTOLIC VOLUME: 94.65 ML
LEFT VENTRICLE MASS INDEX: 89 G/M2
LEFT VENTRICLE SYSTOLIC VOLUME INDEX: 22.7 ML/M2
LEFT VENTRICLE SYSTOLIC VOLUME: 42.43 ML
LEFT VENTRICULAR INTERNAL DIMENSION IN DIASTOLE: 4.55 CM (ref 3.5–6)
LEFT VENTRICULAR MASS: 166.9 G
LEUKOCYTE ESTERASE UR QL STRIP: ABNORMAL
LV LATERAL E/E' RATIO: 23 M/S
LV SEPTAL E/E' RATIO: 23 M/S
MAGNESIUM SERPL-MCNC: 2.5 MG/DL (ref 1.6–2.6)
MCH RBC QN AUTO: 27.1 PG (ref 27–31)
MCHC RBC AUTO-ENTMCNC: 31.9 G/DL (ref 32–36)
MCV RBC AUTO: 85 FL (ref 82–98)
MICROSCOPIC COMMENT: ABNORMAL
MV A" WAVE DURATION": 7.71 MSEC
MV PEAK A VEL: 0.97 M/S
MV PEAK E VEL: 1.15 M/S
MV STENOSIS PRESSURE HALF TIME: 36.46 MS
MV VALVE AREA P 1/2 METHOD: 6.03 CM2
NITRITE UR QL STRIP: NEGATIVE
NUM UNITS TRANS PACKED RBC: NORMAL
PH UR STRIP: 6 [PH] (ref 5–8)
PHOSPHATE SERPL-MCNC: 3.9 MG/DL (ref 2.7–4.5)
PISA TR MAX VEL: 2.8 M/S
PLATELET # BLD AUTO: 125 K/UL (ref 150–450)
PMV BLD AUTO: 11.9 FL (ref 9.2–12.9)
POCT GLUCOSE: 74 MG/DL (ref 70–110)
POTASSIUM SERPL-SCNC: 3.2 MMOL/L (ref 3.5–5.1)
POTASSIUM SERPL-SCNC: 3.3 MMOL/L (ref 3.5–5.1)
PROT SERPL-MCNC: 5.7 G/DL (ref 6–8.4)
PROT SERPL-MCNC: 5.9 G/DL (ref 6–8.4)
PROT UR QL STRIP: ABNORMAL
PROT UR-MCNC: 150 MG/DL (ref 0–15)
PROT/CREAT UR: 1.83 MG/G{CREAT} (ref 0–0.2)
PULM VEIN S/D RATIO: 1.11
PV PEAK D VEL: 0.28 M/S
PV PEAK S VEL: 0.31 M/S
RA MAJOR: 4.89 CM
RA PRESSURE: 3 MMHG
RA WIDTH: 3.69 CM
RBC # BLD AUTO: 2.4 M/UL (ref 4–5.4)
RBC #/AREA URNS AUTO: 70 /HPF (ref 0–4)
RIGHT VENTRICULAR END-DIASTOLIC DIMENSION: 4.05 CM
RV TISSUE DOPPLER FREE WALL SYSTOLIC VELOCITY 1 (APICAL 4 CHAMBER VIEW): 8.84 CM/S
SINUS: 2.7 CM
SODIUM SERPL-SCNC: 138 MMOL/L (ref 136–145)
SODIUM SERPL-SCNC: 139 MMOL/L (ref 136–145)
SODIUM UR-SCNC: 33 MMOL/L (ref 20–250)
SP GR UR STRIP: 1.02 (ref 1–1.03)
SQUAMOUS #/AREA URNS AUTO: 17 /HPF
STJ: 2.36 CM
TDI LATERAL: 0.05 M/S
TDI SEPTAL: 0.05 M/S
TDI: 0.05 M/S
TR MAX PG: 31 MMHG
TRICUSPID ANNULAR PLANE SYSTOLIC EXCURSION: 1.99 CM
TV REST PULMONARY ARTERY PRESSURE: 34 MMHG
URN SPEC COLLECT METH UR: ABNORMAL
WBC # BLD AUTO: 20.72 K/UL (ref 3.9–12.7)
WBC #/AREA URNS AUTO: >100 /HPF (ref 0–5)

## 2021-10-06 PROCEDURE — 20600001 HC STEP DOWN PRIVATE ROOM

## 2021-10-06 PROCEDURE — 94640 AIRWAY INHALATION TREATMENT: CPT

## 2021-10-06 PROCEDURE — 63600175 PHARM REV CODE 636 W HCPCS: Performed by: INTERNAL MEDICINE

## 2021-10-06 PROCEDURE — 99223 PR INITIAL HOSPITAL CARE,LEVL III: ICD-10-PCS | Mod: ,,, | Performed by: HOSPITALIST

## 2021-10-06 PROCEDURE — 25000003 PHARM REV CODE 250: Performed by: INTERNAL MEDICINE

## 2021-10-06 PROCEDURE — 25000003 PHARM REV CODE 250: Performed by: HOSPITALIST

## 2021-10-06 PROCEDURE — 84100 ASSAY OF PHOSPHORUS: CPT | Performed by: INTERNAL MEDICINE

## 2021-10-06 PROCEDURE — 86920 COMPATIBILITY TEST SPIN: CPT | Performed by: INTERNAL MEDICINE

## 2021-10-06 PROCEDURE — 82248 BILIRUBIN DIRECT: CPT | Performed by: INTERNAL MEDICINE

## 2021-10-06 PROCEDURE — 87040 BLOOD CULTURE FOR BACTERIA: CPT | Performed by: INTERNAL MEDICINE

## 2021-10-06 PROCEDURE — 99233 SBSQ HOSP IP/OBS HIGH 50: CPT | Mod: ,,, | Performed by: INTERNAL MEDICINE

## 2021-10-06 PROCEDURE — 84166 PROTEIN E-PHORESIS/URINE/CSF: CPT | Mod: 26,,, | Performed by: PATHOLOGY

## 2021-10-06 PROCEDURE — 99223 1ST HOSP IP/OBS HIGH 75: CPT | Mod: ,,, | Performed by: STUDENT IN AN ORGANIZED HEALTH CARE EDUCATION/TRAINING PROGRAM

## 2021-10-06 PROCEDURE — 85018 HEMOGLOBIN: CPT | Performed by: INTERNAL MEDICINE

## 2021-10-06 PROCEDURE — 86900 BLOOD TYPING SEROLOGIC ABO: CPT | Performed by: INTERNAL MEDICINE

## 2021-10-06 PROCEDURE — 84300 ASSAY OF URINE SODIUM: CPT | Performed by: INTERNAL MEDICINE

## 2021-10-06 PROCEDURE — 84166 PATHOLOGIST INTERPRETATION UPE: ICD-10-PCS | Mod: 26,,, | Performed by: PATHOLOGY

## 2021-10-06 PROCEDURE — 86880 COOMBS TEST DIRECT: CPT | Performed by: INTERNAL MEDICINE

## 2021-10-06 PROCEDURE — 99233 PR SUBSEQUENT HOSPITAL CARE,LEVL III: ICD-10-PCS | Mod: ,,, | Performed by: INTERNAL MEDICINE

## 2021-10-06 PROCEDURE — 81001 URINALYSIS AUTO W/SCOPE: CPT | Performed by: INTERNAL MEDICINE

## 2021-10-06 PROCEDURE — 99223 1ST HOSP IP/OBS HIGH 75: CPT | Mod: ,,, | Performed by: HOSPITALIST

## 2021-10-06 PROCEDURE — 82550 ASSAY OF CK (CPK): CPT | Performed by: INTERNAL MEDICINE

## 2021-10-06 PROCEDURE — 86920 COMPATIBILITY TEST SPIN: CPT | Performed by: STUDENT IN AN ORGANIZED HEALTH CARE EDUCATION/TRAINING PROGRAM

## 2021-10-06 PROCEDURE — 82330 ASSAY OF CALCIUM: CPT | Performed by: INTERNAL MEDICINE

## 2021-10-06 PROCEDURE — 36430 TRANSFUSION BLD/BLD COMPNT: CPT

## 2021-10-06 PROCEDURE — 99223 PR INITIAL HOSPITAL CARE,LEVL III: ICD-10-PCS | Mod: ,,, | Performed by: STUDENT IN AN ORGANIZED HEALTH CARE EDUCATION/TRAINING PROGRAM

## 2021-10-06 PROCEDURE — 85014 HEMATOCRIT: CPT | Performed by: INTERNAL MEDICINE

## 2021-10-06 PROCEDURE — 25000003 PHARM REV CODE 250: Performed by: FAMILY MEDICINE

## 2021-10-06 PROCEDURE — 85027 COMPLETE CBC AUTOMATED: CPT | Performed by: INTERNAL MEDICINE

## 2021-10-06 PROCEDURE — 86160 COMPLEMENT ANTIGEN: CPT | Mod: 59 | Performed by: INTERNAL MEDICINE

## 2021-10-06 PROCEDURE — 94761 N-INVAS EAR/PLS OXIMETRY MLT: CPT

## 2021-10-06 PROCEDURE — 87077 CULTURE AEROBIC IDENTIFY: CPT | Performed by: INTERNAL MEDICINE

## 2021-10-06 PROCEDURE — 82570 ASSAY OF URINE CREATININE: CPT | Performed by: INTERNAL MEDICINE

## 2021-10-06 PROCEDURE — 83735 ASSAY OF MAGNESIUM: CPT | Performed by: INTERNAL MEDICINE

## 2021-10-06 PROCEDURE — 87086 URINE CULTURE/COLONY COUNT: CPT | Performed by: INTERNAL MEDICINE

## 2021-10-06 PROCEDURE — P9016 RBC LEUKOCYTES REDUCED: HCPCS | Performed by: INTERNAL MEDICINE

## 2021-10-06 PROCEDURE — 84166 PROTEIN E-PHORESIS/URINE/CSF: CPT | Performed by: INTERNAL MEDICINE

## 2021-10-06 PROCEDURE — 86160 COMPLEMENT ANTIGEN: CPT | Performed by: INTERNAL MEDICINE

## 2021-10-06 PROCEDURE — 80053 COMPREHEN METABOLIC PANEL: CPT | Mod: 91 | Performed by: INTERNAL MEDICINE

## 2021-10-06 PROCEDURE — 36415 COLL VENOUS BLD VENIPUNCTURE: CPT | Performed by: INTERNAL MEDICINE

## 2021-10-06 RX ORDER — HYDROCODONE BITARTRATE AND ACETAMINOPHEN 500; 5 MG/1; MG/1
TABLET ORAL
Status: DISCONTINUED | OUTPATIENT
Start: 2021-10-06 | End: 2021-10-12

## 2021-10-06 RX ORDER — SODIUM CHLORIDE, SODIUM LACTATE, POTASSIUM CHLORIDE, CALCIUM CHLORIDE 600; 310; 30; 20 MG/100ML; MG/100ML; MG/100ML; MG/100ML
INJECTION, SOLUTION INTRAVENOUS CONTINUOUS
Status: DISCONTINUED | OUTPATIENT
Start: 2021-10-06 | End: 2021-10-07

## 2021-10-06 RX ORDER — ERGOCALCIFEROL 1.25 MG/1
50000 CAPSULE ORAL
Status: DISCONTINUED | OUTPATIENT
Start: 2021-10-06 | End: 2021-10-12 | Stop reason: HOSPADM

## 2021-10-06 RX ADMIN — PIPERACILLIN SODIUM AND TAZOBACTAM SODIUM 4.5 G: 4; .5 INJECTION, POWDER, FOR SOLUTION INTRAVENOUS at 04:10

## 2021-10-06 RX ADMIN — NEPHROCAP 1 CAPSULE: 1 CAP ORAL at 09:10

## 2021-10-06 RX ADMIN — ALBUTEROL SULFATE 2 PUFF: 108 INHALANT RESPIRATORY (INHALATION) at 01:10

## 2021-10-06 RX ADMIN — CALCIUM CARBONATE (ANTACID) CHEW TAB 500 MG 1000 MG: 500 CHEW TAB at 09:10

## 2021-10-06 RX ADMIN — ERGOCALCIFEROL 50000 UNITS: 1.25 CAPSULE ORAL at 09:10

## 2021-10-06 RX ADMIN — MUPIROCIN 1 G: 20 OINTMENT TOPICAL at 09:10

## 2021-10-06 RX ADMIN — MUPIROCIN: 20 OINTMENT TOPICAL at 09:10

## 2021-10-06 RX ADMIN — PIPERACILLIN SODIUM AND TAZOBACTAM SODIUM 4.5 G: 4; .5 INJECTION, POWDER, FOR SOLUTION INTRAVENOUS at 03:10

## 2021-10-06 RX ADMIN — TIZANIDINE 4 MG: 4 TABLET ORAL at 09:10

## 2021-10-06 RX ADMIN — SODIUM CHLORIDE, SODIUM LACTATE, POTASSIUM CHLORIDE, AND CALCIUM CHLORIDE: .6; .31; .03; .02 INJECTION, SOLUTION INTRAVENOUS at 10:10

## 2021-10-06 RX ADMIN — ONDANSETRON 4 MG: 2 INJECTION INTRAMUSCULAR; INTRAVENOUS at 09:10

## 2021-10-06 RX ADMIN — ALBUTEROL SULFATE 2 PUFF: 108 INHALANT RESPIRATORY (INHALATION) at 07:10

## 2021-10-06 RX ADMIN — OXYCODONE HYDROCHLORIDE AND ACETAMINOPHEN 500 MG: 500 TABLET ORAL at 09:10

## 2021-10-06 RX ADMIN — SODIUM CHLORIDE 1000 ML: 0.9 INJECTION, SOLUTION INTRAVENOUS at 09:10

## 2021-10-06 RX ADMIN — SODIUM CHLORIDE: 0.9 INJECTION, SOLUTION INTRAVENOUS at 09:10

## 2021-10-06 RX ADMIN — SODIUM CHLORIDE 500 ML: 0.9 INJECTION, SOLUTION INTRAVENOUS at 07:10

## 2021-10-07 PROBLEM — A49.8 BACTERIAL INFECTION DUE TO E. COLI: Status: ACTIVE | Noted: 2021-10-07

## 2021-10-07 LAB
ALBUMIN SERPL BCP-MCNC: 1.4 G/DL (ref 3.5–5.2)
ALBUMIN SERPL ELPH-MCNC: 1.87 G/DL (ref 3.35–5.55)
ALP SERPL-CCNC: 160 U/L (ref 55–135)
ALPHA1 GLOB SERPL ELPH-MCNC: 0.58 G/DL (ref 0.17–0.41)
ALPHA2 GLOB SERPL ELPH-MCNC: 0.82 G/DL (ref 0.43–0.99)
ALT SERPL W/O P-5'-P-CCNC: 15 U/L (ref 10–44)
ANION GAP SERPL CALC-SCNC: 13 MMOL/L (ref 8–16)
AST SERPL-CCNC: 20 U/L (ref 10–40)
B-GLOBULIN SERPL ELPH-MCNC: 0.62 G/DL (ref 0.5–1.1)
BACTERIA UR CULT: NORMAL
BACTERIA UR CULT: NORMAL
BILIRUB DIRECT SERPL-MCNC: 7.7 MG/DL (ref 0.1–0.3)
BILIRUB SERPL-MCNC: 10.5 MG/DL (ref 0.1–1)
BUN SERPL-MCNC: 66 MG/DL (ref 6–20)
CALCIUM SERPL-MCNC: 8.4 MG/DL (ref 8.7–10.5)
CHLORIDE SERPL-SCNC: 102 MMOL/L (ref 95–110)
CO2 SERPL-SCNC: 19 MMOL/L (ref 23–29)
CORTIS SERPL-MCNC: 12.8 UG/DL
CREAT SERPL-MCNC: 4.1 MG/DL (ref 0.5–1.4)
CREAT UR-MCNC: 87 MG/DL (ref 15–325)
ERYTHROCYTE [DISTWIDTH] IN BLOOD BY AUTOMATED COUNT: 16.7 % (ref 11.5–14.5)
EST. GFR  (AFRICAN AMERICAN): 13.1 ML/MIN/1.73 M^2
EST. GFR  (NON AFRICAN AMERICAN): 11.4 ML/MIN/1.73 M^2
GAMMA GLOB SERPL ELPH-MCNC: 1 G/DL (ref 0.67–1.58)
GLUCOSE SERPL-MCNC: 111 MG/DL (ref 70–110)
HCT VFR BLD AUTO: 21.7 % (ref 37–48.5)
HGB BLD-MCNC: 7.1 G/DL (ref 12–16)
KAPPA LC SER QL IA: 14.77 MG/DL (ref 0.33–1.94)
KAPPA LC/LAMBDA SER IA: 1.68 (ref 0.26–1.65)
LACTATE SERPL-SCNC: 1.5 MMOL/L (ref 0.5–2.2)
LAMBDA LC SER QL IA: 8.81 MG/DL (ref 0.57–2.63)
MAGNESIUM SERPL-MCNC: 2.6 MG/DL (ref 1.6–2.6)
MCH RBC QN AUTO: 26.9 PG (ref 27–31)
MCHC RBC AUTO-ENTMCNC: 32.7 G/DL (ref 32–36)
MCV RBC AUTO: 82 FL (ref 82–98)
OB PNL STL: POSITIVE
PATHOLOGIST INTERPRETATION SPE: NORMAL
PATHOLOGIST INTERPRETATION UPE: NORMAL
PHOSPHATE SERPL-MCNC: 4.4 MG/DL (ref 2.7–4.5)
PLATELET # BLD AUTO: 116 K/UL (ref 150–450)
PMV BLD AUTO: 12.1 FL (ref 9.2–12.9)
POTASSIUM SERPL-SCNC: 3.4 MMOL/L (ref 3.5–5.1)
PROT PATTERN UR ELPH-IMP: NORMAL
PROT SERPL-MCNC: 4.9 G/DL (ref 6–8.4)
PROT SERPL-MCNC: 5.7 G/DL (ref 6–8.4)
PROT UR-MCNC: 83 MG/DL (ref 0–15)
PROT/CREAT UR: 0.95 MG/G{CREAT} (ref 0–0.2)
RBC # BLD AUTO: 2.64 M/UL (ref 4–5.4)
SODIUM SERPL-SCNC: 134 MMOL/L (ref 136–145)
WBC # BLD AUTO: 16.74 K/UL (ref 3.9–12.7)

## 2021-10-07 PROCEDURE — 87046 STOOL CULTR AEROBIC BACT EA: CPT | Mod: 59 | Performed by: INTERNAL MEDICINE

## 2021-10-07 PROCEDURE — 99233 PR SUBSEQUENT HOSPITAL CARE,LEVL III: ICD-10-PCS | Mod: ,,, | Performed by: HOSPITALIST

## 2021-10-07 PROCEDURE — 83605 ASSAY OF LACTIC ACID: CPT | Performed by: INTERNAL MEDICINE

## 2021-10-07 PROCEDURE — 99222 1ST HOSP IP/OBS MODERATE 55: CPT | Mod: ,,, | Performed by: UROLOGY

## 2021-10-07 PROCEDURE — 83735 ASSAY OF MAGNESIUM: CPT | Performed by: INTERNAL MEDICINE

## 2021-10-07 PROCEDURE — 99222 PR INITIAL HOSPITAL CARE,LEVL II: ICD-10-PCS | Mod: ,,, | Performed by: UROLOGY

## 2021-10-07 PROCEDURE — 63600175 PHARM REV CODE 636 W HCPCS: Performed by: STUDENT IN AN ORGANIZED HEALTH CARE EDUCATION/TRAINING PROGRAM

## 2021-10-07 PROCEDURE — 99233 SBSQ HOSP IP/OBS HIGH 50: CPT | Mod: ,,, | Performed by: STUDENT IN AN ORGANIZED HEALTH CARE EDUCATION/TRAINING PROGRAM

## 2021-10-07 PROCEDURE — 87177 OVA AND PARASITES SMEARS: CPT | Performed by: INTERNAL MEDICINE

## 2021-10-07 PROCEDURE — 99233 PR SUBSEQUENT HOSPITAL CARE,LEVL III: ICD-10-PCS | Mod: ,,, | Performed by: STUDENT IN AN ORGANIZED HEALTH CARE EDUCATION/TRAINING PROGRAM

## 2021-10-07 PROCEDURE — 82570 ASSAY OF URINE CREATININE: CPT | Performed by: INTERNAL MEDICINE

## 2021-10-07 PROCEDURE — 25000003 PHARM REV CODE 250: Performed by: INTERNAL MEDICINE

## 2021-10-07 PROCEDURE — 84165 PROTEIN E-PHORESIS SERUM: CPT | Mod: 26,,, | Performed by: PATHOLOGY

## 2021-10-07 PROCEDURE — 25000003 PHARM REV CODE 250: Performed by: HOSPITALIST

## 2021-10-07 PROCEDURE — 84165 PROTEIN E-PHORESIS SERUM: CPT | Performed by: INTERNAL MEDICINE

## 2021-10-07 PROCEDURE — 25000003 PHARM REV CODE 250: Performed by: STUDENT IN AN ORGANIZED HEALTH CARE EDUCATION/TRAINING PROGRAM

## 2021-10-07 PROCEDURE — 20600001 HC STEP DOWN PRIVATE ROOM

## 2021-10-07 PROCEDURE — 82248 BILIRUBIN DIRECT: CPT | Performed by: INTERNAL MEDICINE

## 2021-10-07 PROCEDURE — 94640 AIRWAY INHALATION TREATMENT: CPT

## 2021-10-07 PROCEDURE — 87045 FECES CULTURE AEROBIC BACT: CPT | Performed by: INTERNAL MEDICINE

## 2021-10-07 PROCEDURE — 63600175 PHARM REV CODE 636 W HCPCS: Performed by: INTERNAL MEDICINE

## 2021-10-07 PROCEDURE — 84165 PATHOLOGIST INTERPRETATION SPE: ICD-10-PCS | Mod: 26,,, | Performed by: PATHOLOGY

## 2021-10-07 PROCEDURE — 87427 SHIGA-LIKE TOXIN AG IA: CPT | Mod: 59 | Performed by: INTERNAL MEDICINE

## 2021-10-07 PROCEDURE — 80053 COMPREHEN METABOLIC PANEL: CPT | Performed by: INTERNAL MEDICINE

## 2021-10-07 PROCEDURE — 82270 OCCULT BLOOD FECES: CPT | Performed by: INTERNAL MEDICINE

## 2021-10-07 PROCEDURE — 85027 COMPLETE CBC AUTOMATED: CPT | Performed by: INTERNAL MEDICINE

## 2021-10-07 PROCEDURE — 99233 SBSQ HOSP IP/OBS HIGH 50: CPT | Mod: ,,, | Performed by: HOSPITALIST

## 2021-10-07 PROCEDURE — 83520 IMMUNOASSAY QUANT NOS NONAB: CPT | Performed by: INTERNAL MEDICINE

## 2021-10-07 PROCEDURE — 94761 N-INVAS EAR/PLS OXIMETRY MLT: CPT

## 2021-10-07 PROCEDURE — 36415 COLL VENOUS BLD VENIPUNCTURE: CPT | Performed by: INTERNAL MEDICINE

## 2021-10-07 PROCEDURE — 84100 ASSAY OF PHOSPHORUS: CPT | Performed by: INTERNAL MEDICINE

## 2021-10-07 PROCEDURE — 82533 TOTAL CORTISOL: CPT | Performed by: INTERNAL MEDICINE

## 2021-10-07 PROCEDURE — 87209 SMEAR COMPLEX STAIN: CPT | Performed by: INTERNAL MEDICINE

## 2021-10-07 RX ORDER — POTASSIUM CHLORIDE 20 MEQ/1
40 TABLET, EXTENDED RELEASE ORAL 2 TIMES DAILY
Status: COMPLETED | OUTPATIENT
Start: 2021-10-07 | End: 2021-10-08

## 2021-10-07 RX ORDER — SODIUM CHLORIDE, SODIUM LACTATE, POTASSIUM CHLORIDE, CALCIUM CHLORIDE 600; 310; 30; 20 MG/100ML; MG/100ML; MG/100ML; MG/100ML
INJECTION, SOLUTION INTRAVENOUS ONCE
Status: COMPLETED | OUTPATIENT
Start: 2021-10-07 | End: 2021-10-07

## 2021-10-07 RX ORDER — BUTALBITAL, ACETAMINOPHEN AND CAFFEINE 50; 325; 40 MG/1; MG/1; MG/1
1 TABLET ORAL ONCE AS NEEDED
Status: COMPLETED | OUTPATIENT
Start: 2021-10-07 | End: 2021-10-07

## 2021-10-07 RX ADMIN — ACETAMINOPHEN 650 MG: 325 TABLET ORAL at 08:10

## 2021-10-07 RX ADMIN — MUPIROCIN: 20 OINTMENT TOPICAL at 08:10

## 2021-10-07 RX ADMIN — ALBUTEROL SULFATE 2 PUFF: 108 INHALANT RESPIRATORY (INHALATION) at 07:10

## 2021-10-07 RX ADMIN — OXYCODONE HYDROCHLORIDE AND ACETAMINOPHEN 500 MG: 500 TABLET ORAL at 08:10

## 2021-10-07 RX ADMIN — CALCIUM CARBONATE (ANTACID) CHEW TAB 500 MG 1000 MG: 500 CHEW TAB at 08:10

## 2021-10-07 RX ADMIN — POTASSIUM CHLORIDE 40 MEQ: 1500 TABLET, EXTENDED RELEASE ORAL at 05:10

## 2021-10-07 RX ADMIN — SODIUM CHLORIDE 500 ML: 0.9 INJECTION, SOLUTION INTRAVENOUS at 04:10

## 2021-10-07 RX ADMIN — SODIUM CHLORIDE, SODIUM LACTATE, POTASSIUM CHLORIDE, AND CALCIUM CHLORIDE: .6; .31; .03; .02 INJECTION, SOLUTION INTRAVENOUS at 05:10

## 2021-10-07 RX ADMIN — ALBUTEROL SULFATE 2 PUFF: 108 INHALANT RESPIRATORY (INHALATION) at 01:10

## 2021-10-07 RX ADMIN — SODIUM CHLORIDE, SODIUM LACTATE, POTASSIUM CHLORIDE, AND CALCIUM CHLORIDE 1000 ML: .6; .31; .03; .02 INJECTION, SOLUTION INTRAVENOUS at 08:10

## 2021-10-07 RX ADMIN — NEPHROCAP 1 CAPSULE: 1 CAP ORAL at 08:10

## 2021-10-07 RX ADMIN — PIPERACILLIN SODIUM AND TAZOBACTAM SODIUM 4.5 G: 4; .5 INJECTION, POWDER, FOR SOLUTION INTRAVENOUS at 02:10

## 2021-10-07 RX ADMIN — BUTALBITAL, ACETAMINOPHEN, AND CAFFEINE 1 TABLET: 50; 325; 40 TABLET ORAL at 08:10

## 2021-10-07 RX ADMIN — ALBUTEROL SULFATE 2 PUFF: 108 INHALANT RESPIRATORY (INHALATION) at 12:10

## 2021-10-07 RX ADMIN — ACETAMINOPHEN 650 MG: 325 TABLET ORAL at 05:10

## 2021-10-08 PROBLEM — E88.09 HYPOALBUMINEMIA: Status: ACTIVE | Noted: 2021-10-08

## 2021-10-08 PROBLEM — I10 ESSENTIAL HYPERTENSION: Status: ACTIVE | Noted: 2021-10-08

## 2021-10-08 PROBLEM — Z71.6 TOBACCO ABUSE COUNSELING: Status: ACTIVE | Noted: 2021-10-08

## 2021-10-08 PROBLEM — N30.01 ACUTE CYSTITIS WITH HEMATURIA: Status: ACTIVE | Noted: 2021-10-08

## 2021-10-08 PROBLEM — N28.81 NEPHROMEGALY: Status: ACTIVE | Noted: 2021-10-08

## 2021-10-08 PROBLEM — Z72.0 TOBACCO ABUSE: Status: ACTIVE | Noted: 2021-10-08

## 2021-10-08 PROBLEM — N17.0 ATN (ACUTE TUBULAR NECROSIS): Status: ACTIVE | Noted: 2021-10-05

## 2021-10-08 PROBLEM — E87.6 HYPOKALEMIA: Status: RESOLVED | Noted: 2021-10-05 | Resolved: 2021-10-08

## 2021-10-08 PROBLEM — R74.8 ELEVATED ALKALINE PHOSPHATASE LEVEL: Status: ACTIVE | Noted: 2021-10-08

## 2021-10-08 PROBLEM — E87.20 LACTIC ACIDOSIS: Status: RESOLVED | Noted: 2021-10-05 | Resolved: 2021-10-08

## 2021-10-08 PROBLEM — R79.89 ELEVATED PROCALCITONIN: Status: ACTIVE | Noted: 2021-10-08

## 2021-10-08 PROBLEM — A41.51 SEPSIS DUE TO ESCHERICHIA COLI WITH ACUTE RENAL FAILURE AND TUBULAR NECROSIS WITHOUT SEPTIC SHOCK: Status: ACTIVE | Noted: 2021-10-05

## 2021-10-08 PROBLEM — R39.89 PNEUMATURIA: Status: ACTIVE | Noted: 2021-10-08

## 2021-10-08 PROBLEM — E66.811 CLASS 1 OBESITY DUE TO EXCESS CALORIES WITHOUT SERIOUS COMORBIDITY WITH BODY MASS INDEX (BMI) OF 30.0 TO 30.9 IN ADULT: Status: ACTIVE | Noted: 2021-10-08

## 2021-10-08 PROBLEM — E80.6 HYPERBILIRUBINEMIA: Status: ACTIVE | Noted: 2021-10-05

## 2021-10-08 PROBLEM — R16.0 HEPATOMEGALY: Status: ACTIVE | Noted: 2021-10-08

## 2021-10-08 PROBLEM — N17.0 SEPSIS DUE TO ESCHERICHIA COLI WITH ACUTE RENAL FAILURE AND TUBULAR NECROSIS WITHOUT SEPTIC SHOCK: Status: ACTIVE | Noted: 2021-10-05

## 2021-10-08 PROBLEM — R65.20 SEPSIS DUE TO ESCHERICHIA COLI WITH ACUTE RENAL FAILURE AND TUBULAR NECROSIS WITHOUT SEPTIC SHOCK: Status: ACTIVE | Noted: 2021-10-05

## 2021-10-08 PROBLEM — E66.09 CLASS 1 OBESITY DUE TO EXCESS CALORIES WITHOUT SERIOUS COMORBIDITY WITH BODY MASS INDEX (BMI) OF 30.0 TO 30.9 IN ADULT: Status: ACTIVE | Noted: 2021-10-08

## 2021-10-08 LAB
ALBUMIN SERPL BCP-MCNC: 1.4 G/DL (ref 3.5–5.2)
ALP SERPL-CCNC: 144 U/L (ref 55–135)
ALT SERPL W/O P-5'-P-CCNC: 17 U/L (ref 10–44)
ANION GAP SERPL CALC-SCNC: 15 MMOL/L (ref 8–16)
AST SERPL-CCNC: 22 U/L (ref 10–40)
BACTERIA BLD CULT: ABNORMAL
BILIRUB SERPL-MCNC: 9.9 MG/DL (ref 0.1–1)
BUN SERPL-MCNC: 55 MG/DL (ref 6–20)
CALCIUM SERPL-MCNC: 8.7 MG/DL (ref 8.7–10.5)
CHLORIDE SERPL-SCNC: 103 MMOL/L (ref 95–110)
CO2 SERPL-SCNC: 18 MMOL/L (ref 23–29)
CREAT SERPL-MCNC: 3.7 MG/DL (ref 0.5–1.4)
ERYTHROCYTE [DISTWIDTH] IN BLOOD BY AUTOMATED COUNT: 17.1 % (ref 11.5–14.5)
EST. GFR  (AFRICAN AMERICAN): 14.9 ML/MIN/1.73 M^2
EST. GFR  (NON AFRICAN AMERICAN): 12.9 ML/MIN/1.73 M^2
GLUCOSE SERPL-MCNC: 83 MG/DL (ref 70–110)
HCT VFR BLD AUTO: 22.9 % (ref 37–48.5)
HGB BLD-MCNC: 7.2 G/DL (ref 12–16)
INR PPP: 1.2 (ref 0.8–1.2)
MAGNESIUM SERPL-MCNC: 2.5 MG/DL (ref 1.6–2.6)
MCH RBC QN AUTO: 26 PG (ref 27–31)
MCHC RBC AUTO-ENTMCNC: 31.4 G/DL (ref 32–36)
MCV RBC AUTO: 83 FL (ref 82–98)
PHOSPHATE SERPL-MCNC: 3.8 MG/DL (ref 2.7–4.5)
PLATELET # BLD AUTO: 120 K/UL (ref 150–450)
PMV BLD AUTO: 12.4 FL (ref 9.2–12.9)
POTASSIUM SERPL-SCNC: 3.6 MMOL/L (ref 3.5–5.1)
PROCALCITONIN SERPL IA-MCNC: 49.91 NG/ML
PROT SERPL-MCNC: 5.8 G/DL (ref 6–8.4)
PROTHROMBIN TIME: 13.1 SEC (ref 9–12.5)
RBC # BLD AUTO: 2.77 M/UL (ref 4–5.4)
SODIUM SERPL-SCNC: 136 MMOL/L (ref 136–145)
WBC # BLD AUTO: 19.26 K/UL (ref 3.9–12.7)

## 2021-10-08 PROCEDURE — 25000242 PHARM REV CODE 250 ALT 637 W/ HCPCS: Performed by: INTERNAL MEDICINE

## 2021-10-08 PROCEDURE — 36415 COLL VENOUS BLD VENIPUNCTURE: CPT | Performed by: STUDENT IN AN ORGANIZED HEALTH CARE EDUCATION/TRAINING PROGRAM

## 2021-10-08 PROCEDURE — 94640 AIRWAY INHALATION TREATMENT: CPT

## 2021-10-08 PROCEDURE — 25000003 PHARM REV CODE 250: Performed by: STUDENT IN AN ORGANIZED HEALTH CARE EDUCATION/TRAINING PROGRAM

## 2021-10-08 PROCEDURE — 20600001 HC STEP DOWN PRIVATE ROOM

## 2021-10-08 PROCEDURE — 25000003 PHARM REV CODE 250: Performed by: INTERNAL MEDICINE

## 2021-10-08 PROCEDURE — 87040 BLOOD CULTURE FOR BACTERIA: CPT | Performed by: STUDENT IN AN ORGANIZED HEALTH CARE EDUCATION/TRAINING PROGRAM

## 2021-10-08 PROCEDURE — 99233 SBSQ HOSP IP/OBS HIGH 50: CPT | Mod: ,,, | Performed by: HOSPITALIST

## 2021-10-08 PROCEDURE — 80053 COMPREHEN METABOLIC PANEL: CPT | Performed by: INTERNAL MEDICINE

## 2021-10-08 PROCEDURE — 85027 COMPLETE CBC AUTOMATED: CPT | Performed by: INTERNAL MEDICINE

## 2021-10-08 PROCEDURE — 99233 SBSQ HOSP IP/OBS HIGH 50: CPT | Mod: ,,, | Performed by: STUDENT IN AN ORGANIZED HEALTH CARE EDUCATION/TRAINING PROGRAM

## 2021-10-08 PROCEDURE — 84145 PROCALCITONIN (PCT): CPT | Performed by: STUDENT IN AN ORGANIZED HEALTH CARE EDUCATION/TRAINING PROGRAM

## 2021-10-08 PROCEDURE — 85610 PROTHROMBIN TIME: CPT | Performed by: STUDENT IN AN ORGANIZED HEALTH CARE EDUCATION/TRAINING PROGRAM

## 2021-10-08 PROCEDURE — 99233 PR SUBSEQUENT HOSPITAL CARE,LEVL III: ICD-10-PCS | Mod: ,,, | Performed by: STUDENT IN AN ORGANIZED HEALTH CARE EDUCATION/TRAINING PROGRAM

## 2021-10-08 PROCEDURE — 83735 ASSAY OF MAGNESIUM: CPT | Performed by: INTERNAL MEDICINE

## 2021-10-08 PROCEDURE — 63600175 PHARM REV CODE 636 W HCPCS: Performed by: INTERNAL MEDICINE

## 2021-10-08 PROCEDURE — 84100 ASSAY OF PHOSPHORUS: CPT | Performed by: INTERNAL MEDICINE

## 2021-10-08 PROCEDURE — 99233 PR SUBSEQUENT HOSPITAL CARE,LEVL III: ICD-10-PCS | Mod: ,,, | Performed by: HOSPITALIST

## 2021-10-08 PROCEDURE — 94761 N-INVAS EAR/PLS OXIMETRY MLT: CPT

## 2021-10-08 PROCEDURE — 63600175 PHARM REV CODE 636 W HCPCS: Performed by: STUDENT IN AN ORGANIZED HEALTH CARE EDUCATION/TRAINING PROGRAM

## 2021-10-08 RX ORDER — SODIUM CHLORIDE, SODIUM LACTATE, POTASSIUM CHLORIDE, CALCIUM CHLORIDE 600; 310; 30; 20 MG/100ML; MG/100ML; MG/100ML; MG/100ML
INJECTION, SOLUTION INTRAVENOUS CONTINUOUS
Status: DISCONTINUED | OUTPATIENT
Start: 2021-10-08 | End: 2021-10-08

## 2021-10-08 RX ORDER — SODIUM CHLORIDE, SODIUM LACTATE, POTASSIUM CHLORIDE, CALCIUM CHLORIDE 600; 310; 30; 20 MG/100ML; MG/100ML; MG/100ML; MG/100ML
INJECTION, SOLUTION INTRAVENOUS ONCE
Status: COMPLETED | OUTPATIENT
Start: 2021-10-08 | End: 2021-10-09

## 2021-10-08 RX ADMIN — ALBUTEROL SULFATE 2 PUFF: 108 INHALANT RESPIRATORY (INHALATION) at 10:10

## 2021-10-08 RX ADMIN — SODIUM CHLORIDE, SODIUM LACTATE, POTASSIUM CHLORIDE, AND CALCIUM CHLORIDE: .6; .31; .03; .02 INJECTION, SOLUTION INTRAVENOUS at 05:10

## 2021-10-08 RX ADMIN — ALBUTEROL SULFATE 2 PUFF: 108 INHALANT RESPIRATORY (INHALATION) at 01:10

## 2021-10-08 RX ADMIN — NEPHROCAP 1 CAPSULE: 1 CAP ORAL at 08:10

## 2021-10-08 RX ADMIN — CALCIUM CARBONATE (ANTACID) CHEW TAB 500 MG 1000 MG: 500 CHEW TAB at 09:10

## 2021-10-08 RX ADMIN — ACETAMINOPHEN 650 MG: 325 TABLET ORAL at 05:10

## 2021-10-08 RX ADMIN — MUPIROCIN: 20 OINTMENT TOPICAL at 09:10

## 2021-10-08 RX ADMIN — ACETAMINOPHEN 650 MG: 325 TABLET ORAL at 10:10

## 2021-10-08 RX ADMIN — CALCIUM CARBONATE (ANTACID) CHEW TAB 500 MG 1000 MG: 500 CHEW TAB at 08:10

## 2021-10-08 RX ADMIN — ALBUTEROL SULFATE 2 PUFF: 108 INHALANT RESPIRATORY (INHALATION) at 03:10

## 2021-10-08 RX ADMIN — ONDANSETRON 4 MG: 2 INJECTION INTRAMUSCULAR; INTRAVENOUS at 03:10

## 2021-10-08 RX ADMIN — ACETAMINOPHEN 650 MG: 325 TABLET ORAL at 09:10

## 2021-10-08 RX ADMIN — OXYCODONE HYDROCHLORIDE AND ACETAMINOPHEN 500 MG: 500 TABLET ORAL at 09:10

## 2021-10-08 RX ADMIN — PIPERACILLIN SODIUM AND TAZOBACTAM SODIUM 4.5 G: 4; .5 INJECTION, POWDER, FOR SOLUTION INTRAVENOUS at 02:10

## 2021-10-08 RX ADMIN — PIPERACILLIN SODIUM AND TAZOBACTAM SODIUM 4.5 G: 4; .5 INJECTION, POWDER, FOR SOLUTION INTRAVENOUS at 01:10

## 2021-10-08 RX ADMIN — POTASSIUM CHLORIDE 40 MEQ: 1500 TABLET, EXTENDED RELEASE ORAL at 08:10

## 2021-10-08 RX ADMIN — OXYCODONE HYDROCHLORIDE AND ACETAMINOPHEN 500 MG: 500 TABLET ORAL at 08:10

## 2021-10-09 LAB
ABO + RH BLD: NORMAL
ALBUMIN SERPL BCP-MCNC: 1.5 G/DL (ref 3.5–5.2)
ALP SERPL-CCNC: 132 U/L (ref 55–135)
ALT SERPL W/O P-5'-P-CCNC: 18 U/L (ref 10–44)
ANION GAP SERPL CALC-SCNC: 12 MMOL/L (ref 8–16)
AST SERPL-CCNC: 22 U/L (ref 10–40)
BILIRUB SERPL-MCNC: 5.9 MG/DL (ref 0.1–1)
BLD GP AB SCN CELLS X3 SERPL QL: NORMAL
BLD PROD TYP BPU: NORMAL
BLOOD UNIT EXPIRATION DATE: NORMAL
BLOOD UNIT TYPE CODE: 6200
BLOOD UNIT TYPE: NORMAL
BUN SERPL-MCNC: 45 MG/DL (ref 6–20)
CALCIUM SERPL-MCNC: 8.9 MG/DL (ref 8.7–10.5)
CHLORIDE SERPL-SCNC: 106 MMOL/L (ref 95–110)
CO2 SERPL-SCNC: 17 MMOL/L (ref 23–29)
CODING SYSTEM: NORMAL
CREAT SERPL-MCNC: 2.8 MG/DL (ref 0.5–1.4)
DISPENSE STATUS: NORMAL
ERYTHROCYTE [DISTWIDTH] IN BLOOD BY AUTOMATED COUNT: 16.9 % (ref 11.5–14.5)
EST. GFR  (AFRICAN AMERICAN): 20.8 ML/MIN/1.73 M^2
EST. GFR  (NON AFRICAN AMERICAN): 18.1 ML/MIN/1.73 M^2
GLUCOSE SERPL-MCNC: 86 MG/DL (ref 70–110)
HAPTOGLOB SERPL-MCNC: 206 MG/DL (ref 30–250)
HCT VFR BLD AUTO: 22.6 % (ref 37–48.5)
HGB BLD-MCNC: 6.9 G/DL (ref 12–16)
INR PPP: 1.2 (ref 0.8–1.2)
LDH SERPL L TO P-CCNC: 213 U/L (ref 110–260)
MAGNESIUM SERPL-MCNC: 2.3 MG/DL (ref 1.6–2.6)
MCH RBC QN AUTO: 25.8 PG (ref 27–31)
MCHC RBC AUTO-ENTMCNC: 30.5 G/DL (ref 32–36)
MCV RBC AUTO: 85 FL (ref 82–98)
O+P STL MICRO: NORMAL
PHOSPHATE SERPL-MCNC: 3.3 MG/DL (ref 2.7–4.5)
PLATELET # BLD AUTO: 120 K/UL (ref 150–450)
PMV BLD AUTO: 11.4 FL (ref 9.2–12.9)
POTASSIUM SERPL-SCNC: 3.7 MMOL/L (ref 3.5–5.1)
PROCALCITONIN SERPL IA-MCNC: 23.07 NG/ML
PROT SERPL-MCNC: 6.1 G/DL (ref 6–8.4)
PROTHROMBIN TIME: 12.7 SEC (ref 9–12.5)
RBC # BLD AUTO: 2.67 M/UL (ref 4–5.4)
SODIUM SERPL-SCNC: 135 MMOL/L (ref 136–145)
TRANS ERYTHROCYTES VOL PATIENT: NORMAL ML
WBC # BLD AUTO: 16.44 K/UL (ref 3.9–12.7)

## 2021-10-09 PROCEDURE — P9021 RED BLOOD CELLS UNIT: HCPCS | Performed by: STUDENT IN AN ORGANIZED HEALTH CARE EDUCATION/TRAINING PROGRAM

## 2021-10-09 PROCEDURE — 83615 LACTATE (LD) (LDH) ENZYME: CPT | Performed by: HOSPITALIST

## 2021-10-09 PROCEDURE — 36415 COLL VENOUS BLD VENIPUNCTURE: CPT | Performed by: INTERNAL MEDICINE

## 2021-10-09 PROCEDURE — 25000003 PHARM REV CODE 250: Performed by: INTERNAL MEDICINE

## 2021-10-09 PROCEDURE — 83010 ASSAY OF HAPTOGLOBIN QUANT: CPT | Performed by: HOSPITALIST

## 2021-10-09 PROCEDURE — 99232 SBSQ HOSP IP/OBS MODERATE 35: CPT | Mod: ,,, | Performed by: HOSPITALIST

## 2021-10-09 PROCEDURE — 25000003 PHARM REV CODE 250: Performed by: HOSPITALIST

## 2021-10-09 PROCEDURE — 99233 PR SUBSEQUENT HOSPITAL CARE,LEVL III: ICD-10-PCS | Mod: ,,, | Performed by: STUDENT IN AN ORGANIZED HEALTH CARE EDUCATION/TRAINING PROGRAM

## 2021-10-09 PROCEDURE — 94761 N-INVAS EAR/PLS OXIMETRY MLT: CPT

## 2021-10-09 PROCEDURE — 99233 SBSQ HOSP IP/OBS HIGH 50: CPT | Mod: ,,, | Performed by: STUDENT IN AN ORGANIZED HEALTH CARE EDUCATION/TRAINING PROGRAM

## 2021-10-09 PROCEDURE — 36415 COLL VENOUS BLD VENIPUNCTURE: CPT | Performed by: STUDENT IN AN ORGANIZED HEALTH CARE EDUCATION/TRAINING PROGRAM

## 2021-10-09 PROCEDURE — 84145 PROCALCITONIN (PCT): CPT | Performed by: STUDENT IN AN ORGANIZED HEALTH CARE EDUCATION/TRAINING PROGRAM

## 2021-10-09 PROCEDURE — 83735 ASSAY OF MAGNESIUM: CPT | Performed by: INTERNAL MEDICINE

## 2021-10-09 PROCEDURE — 99232 PR SUBSEQUENT HOSPITAL CARE,LEVL II: ICD-10-PCS | Mod: ,,, | Performed by: HOSPITALIST

## 2021-10-09 PROCEDURE — 94640 AIRWAY INHALATION TREATMENT: CPT

## 2021-10-09 PROCEDURE — 84100 ASSAY OF PHOSPHORUS: CPT | Performed by: INTERNAL MEDICINE

## 2021-10-09 PROCEDURE — 20600001 HC STEP DOWN PRIVATE ROOM

## 2021-10-09 PROCEDURE — 85610 PROTHROMBIN TIME: CPT | Performed by: STUDENT IN AN ORGANIZED HEALTH CARE EDUCATION/TRAINING PROGRAM

## 2021-10-09 PROCEDURE — 86900 BLOOD TYPING SEROLOGIC ABO: CPT | Performed by: STUDENT IN AN ORGANIZED HEALTH CARE EDUCATION/TRAINING PROGRAM

## 2021-10-09 PROCEDURE — 85027 COMPLETE CBC AUTOMATED: CPT | Performed by: INTERNAL MEDICINE

## 2021-10-09 PROCEDURE — 63600175 PHARM REV CODE 636 W HCPCS: Performed by: INTERNAL MEDICINE

## 2021-10-09 PROCEDURE — 80053 COMPREHEN METABOLIC PANEL: CPT | Performed by: INTERNAL MEDICINE

## 2021-10-09 PROCEDURE — 36415 COLL VENOUS BLD VENIPUNCTURE: CPT | Performed by: HOSPITALIST

## 2021-10-09 RX ORDER — SODIUM BICARBONATE 650 MG/1
1300 TABLET ORAL 2 TIMES DAILY
Status: DISCONTINUED | OUTPATIENT
Start: 2021-10-09 | End: 2021-10-12 | Stop reason: HOSPADM

## 2021-10-09 RX ORDER — HYDROCODONE BITARTRATE AND ACETAMINOPHEN 500; 5 MG/1; MG/1
TABLET ORAL
Status: DISCONTINUED | OUTPATIENT
Start: 2021-10-09 | End: 2021-10-12

## 2021-10-09 RX ADMIN — NEPHROCAP 1 CAPSULE: 1 CAP ORAL at 08:10

## 2021-10-09 RX ADMIN — ALBUTEROL SULFATE 2 PUFF: 108 INHALANT RESPIRATORY (INHALATION) at 07:10

## 2021-10-09 RX ADMIN — SODIUM BICARBONATE 1300 MG: 650 TABLET ORAL at 09:10

## 2021-10-09 RX ADMIN — ONDANSETRON 4 MG: 2 INJECTION INTRAMUSCULAR; INTRAVENOUS at 06:10

## 2021-10-09 RX ADMIN — SODIUM BICARBONATE 1300 MG: 650 TABLET ORAL at 11:10

## 2021-10-09 RX ADMIN — OXYCODONE HYDROCHLORIDE AND ACETAMINOPHEN 500 MG: 500 TABLET ORAL at 08:10

## 2021-10-09 RX ADMIN — CALCIUM CARBONATE (ANTACID) CHEW TAB 500 MG 1000 MG: 500 CHEW TAB at 09:10

## 2021-10-09 RX ADMIN — CALCIUM CARBONATE (ANTACID) CHEW TAB 500 MG 1000 MG: 500 CHEW TAB at 08:10

## 2021-10-09 RX ADMIN — MUPIROCIN: 20 OINTMENT TOPICAL at 09:10

## 2021-10-09 RX ADMIN — OXYCODONE HYDROCHLORIDE AND ACETAMINOPHEN 500 MG: 500 TABLET ORAL at 09:10

## 2021-10-09 RX ADMIN — ALBUTEROL SULFATE 2 PUFF: 108 INHALANT RESPIRATORY (INHALATION) at 01:10

## 2021-10-09 RX ADMIN — PIPERACILLIN SODIUM AND TAZOBACTAM SODIUM 4.5 G: 4; .5 INJECTION, POWDER, FOR SOLUTION INTRAVENOUS at 05:10

## 2021-10-09 RX ADMIN — ACETAMINOPHEN 650 MG: 325 TABLET ORAL at 06:10

## 2021-10-09 RX ADMIN — PIPERACILLIN SODIUM AND TAZOBACTAM SODIUM 4.5 G: 4; .5 INJECTION, POWDER, FOR SOLUTION INTRAVENOUS at 02:10

## 2021-10-10 LAB
ALBUMIN SERPL BCP-MCNC: 1.7 G/DL (ref 3.5–5.2)
ALP SERPL-CCNC: 124 U/L (ref 55–135)
ALT SERPL W/O P-5'-P-CCNC: 19 U/L (ref 10–44)
ANION GAP SERPL CALC-SCNC: 12 MMOL/L (ref 8–16)
APTT BLDCRRT: 23.8 SEC (ref 21–32)
AST SERPL-CCNC: 29 U/L (ref 10–40)
BACTERIA BLD CULT: ABNORMAL
BILIRUB SERPL-MCNC: 2.9 MG/DL (ref 0.1–1)
BUN SERPL-MCNC: 27 MG/DL (ref 6–20)
CALCIUM SERPL-MCNC: 8.8 MG/DL (ref 8.7–10.5)
CHLORIDE SERPL-SCNC: 106 MMOL/L (ref 95–110)
CO2 SERPL-SCNC: 21 MMOL/L (ref 23–29)
CREAT SERPL-MCNC: 2 MG/DL (ref 0.5–1.4)
D DIMER PPP IA.FEU-MCNC: 15.46 MG/L FEU
ERYTHROCYTE [DISTWIDTH] IN BLOOD BY AUTOMATED COUNT: 16.9 % (ref 11.5–14.5)
EST. GFR  (AFRICAN AMERICAN): 31.3 ML/MIN/1.73 M^2
EST. GFR  (NON AFRICAN AMERICAN): 27.1 ML/MIN/1.73 M^2
FIBRINOGEN PPP-MCNC: 609 MG/DL (ref 182–400)
GLUCOSE SERPL-MCNC: 88 MG/DL (ref 70–110)
HAPTOGLOB SERPL-MCNC: 194 MG/DL (ref 30–250)
HCT VFR BLD AUTO: 25.1 % (ref 37–48.5)
HGB BLD-MCNC: 8 G/DL (ref 12–16)
INR PPP: 1.1 (ref 0.8–1.2)
MAGNESIUM SERPL-MCNC: 2 MG/DL (ref 1.6–2.6)
MCH RBC QN AUTO: 27.2 PG (ref 27–31)
MCHC RBC AUTO-ENTMCNC: 31.9 G/DL (ref 32–36)
MCV RBC AUTO: 85 FL (ref 82–98)
PHOSPHATE SERPL-MCNC: 3.7 MG/DL (ref 2.7–4.5)
PLATELET # BLD AUTO: 170 K/UL (ref 150–450)
PMV BLD AUTO: 11.4 FL (ref 9.2–12.9)
POTASSIUM SERPL-SCNC: 3.6 MMOL/L (ref 3.5–5.1)
PROCALCITONIN SERPL IA-MCNC: 8.95 NG/ML
PROT SERPL-MCNC: 6.6 G/DL (ref 6–8.4)
PROTHROMBIN TIME: 12.4 SEC (ref 9–12.5)
RBC # BLD AUTO: 2.94 M/UL (ref 4–5.4)
SODIUM SERPL-SCNC: 139 MMOL/L (ref 136–145)
WBC # BLD AUTO: 10.28 K/UL (ref 3.9–12.7)

## 2021-10-10 PROCEDURE — 80053 COMPREHEN METABOLIC PANEL: CPT | Performed by: INTERNAL MEDICINE

## 2021-10-10 PROCEDURE — 99233 SBSQ HOSP IP/OBS HIGH 50: CPT | Mod: GT,,, | Performed by: INTERNAL MEDICINE

## 2021-10-10 PROCEDURE — 20600001 HC STEP DOWN PRIVATE ROOM

## 2021-10-10 PROCEDURE — 85610 PROTHROMBIN TIME: CPT | Performed by: STUDENT IN AN ORGANIZED HEALTH CARE EDUCATION/TRAINING PROGRAM

## 2021-10-10 PROCEDURE — 85379 FIBRIN DEGRADATION QUANT: CPT | Performed by: STUDENT IN AN ORGANIZED HEALTH CARE EDUCATION/TRAINING PROGRAM

## 2021-10-10 PROCEDURE — 83010 ASSAY OF HAPTOGLOBIN QUANT: CPT | Performed by: STUDENT IN AN ORGANIZED HEALTH CARE EDUCATION/TRAINING PROGRAM

## 2021-10-10 PROCEDURE — 84100 ASSAY OF PHOSPHORUS: CPT | Performed by: INTERNAL MEDICINE

## 2021-10-10 PROCEDURE — 63600175 PHARM REV CODE 636 W HCPCS: Performed by: INTERNAL MEDICINE

## 2021-10-10 PROCEDURE — 25000003 PHARM REV CODE 250: Performed by: HOSPITALIST

## 2021-10-10 PROCEDURE — 85730 THROMBOPLASTIN TIME PARTIAL: CPT | Performed by: STUDENT IN AN ORGANIZED HEALTH CARE EDUCATION/TRAINING PROGRAM

## 2021-10-10 PROCEDURE — 25000242 PHARM REV CODE 250 ALT 637 W/ HCPCS: Performed by: INTERNAL MEDICINE

## 2021-10-10 PROCEDURE — 99233 PR SUBSEQUENT HOSPITAL CARE,LEVL III: ICD-10-PCS | Mod: ,,, | Performed by: STUDENT IN AN ORGANIZED HEALTH CARE EDUCATION/TRAINING PROGRAM

## 2021-10-10 PROCEDURE — 94640 AIRWAY INHALATION TREATMENT: CPT

## 2021-10-10 PROCEDURE — 99233 PR SUBSEQUENT HOSPITAL CARE,LEVL III: ICD-10-PCS | Mod: GT,,, | Performed by: INTERNAL MEDICINE

## 2021-10-10 PROCEDURE — 94761 N-INVAS EAR/PLS OXIMETRY MLT: CPT

## 2021-10-10 PROCEDURE — 83735 ASSAY OF MAGNESIUM: CPT | Performed by: INTERNAL MEDICINE

## 2021-10-10 PROCEDURE — 85384 FIBRINOGEN ACTIVITY: CPT | Performed by: STUDENT IN AN ORGANIZED HEALTH CARE EDUCATION/TRAINING PROGRAM

## 2021-10-10 PROCEDURE — 99233 SBSQ HOSP IP/OBS HIGH 50: CPT | Mod: ,,, | Performed by: STUDENT IN AN ORGANIZED HEALTH CARE EDUCATION/TRAINING PROGRAM

## 2021-10-10 PROCEDURE — 84145 PROCALCITONIN (PCT): CPT | Performed by: STUDENT IN AN ORGANIZED HEALTH CARE EDUCATION/TRAINING PROGRAM

## 2021-10-10 PROCEDURE — 25000003 PHARM REV CODE 250: Performed by: INTERNAL MEDICINE

## 2021-10-10 PROCEDURE — 85027 COMPLETE CBC AUTOMATED: CPT | Performed by: INTERNAL MEDICINE

## 2021-10-10 PROCEDURE — 83051 HEMOGLOBIN PLASMA: CPT | Performed by: STUDENT IN AN ORGANIZED HEALTH CARE EDUCATION/TRAINING PROGRAM

## 2021-10-10 PROCEDURE — 36430 TRANSFUSION BLD/BLD COMPNT: CPT

## 2021-10-10 RX ADMIN — ALBUTEROL SULFATE 2 PUFF: 108 INHALANT RESPIRATORY (INHALATION) at 07:10

## 2021-10-10 RX ADMIN — NEPHROCAP 1 CAPSULE: 1 CAP ORAL at 08:10

## 2021-10-10 RX ADMIN — OXYCODONE HYDROCHLORIDE AND ACETAMINOPHEN 500 MG: 500 TABLET ORAL at 08:10

## 2021-10-10 RX ADMIN — CEFTRIAXONE 2 G: 2 INJECTION, POWDER, FOR SOLUTION INTRAMUSCULAR; INTRAVENOUS at 02:10

## 2021-10-10 RX ADMIN — SODIUM BICARBONATE 1300 MG: 650 TABLET ORAL at 09:10

## 2021-10-10 RX ADMIN — ALBUTEROL SULFATE 2 PUFF: 108 INHALANT RESPIRATORY (INHALATION) at 12:10

## 2021-10-10 RX ADMIN — PIPERACILLIN SODIUM AND TAZOBACTAM SODIUM 4.5 G: 4; .5 INJECTION, POWDER, FOR SOLUTION INTRAVENOUS at 05:10

## 2021-10-10 RX ADMIN — OXYCODONE HYDROCHLORIDE AND ACETAMINOPHEN 500 MG: 500 TABLET ORAL at 09:10

## 2021-10-10 RX ADMIN — SODIUM BICARBONATE 1300 MG: 650 TABLET ORAL at 08:10

## 2021-10-10 RX ADMIN — CALCIUM CARBONATE (ANTACID) CHEW TAB 500 MG 1000 MG: 500 CHEW TAB at 08:10

## 2021-10-10 RX ADMIN — CALCIUM CARBONATE (ANTACID) CHEW TAB 500 MG 1000 MG: 500 CHEW TAB at 09:10

## 2021-10-10 RX ADMIN — ALBUTEROL SULFATE 2 PUFF: 108 INHALANT RESPIRATORY (INHALATION) at 01:10

## 2021-10-11 LAB
ALBUMIN SERPL BCP-MCNC: 1.7 G/DL (ref 3.5–5.2)
ALP SERPL-CCNC: 95 U/L (ref 55–135)
ALT SERPL W/O P-5'-P-CCNC: 22 U/L (ref 10–44)
ANION GAP SERPL CALC-SCNC: 11 MMOL/L (ref 8–16)
AST SERPL-CCNC: 32 U/L (ref 10–40)
BACTERIA BLD CULT: NORMAL
BACTERIA STL CULT: NORMAL
BILIRUB SERPL-MCNC: 2.2 MG/DL (ref 0.1–1)
BUN SERPL-MCNC: 14 MG/DL (ref 6–20)
CALCIUM SERPL-MCNC: 8.5 MG/DL (ref 8.7–10.5)
CHLORIDE SERPL-SCNC: 106 MMOL/L (ref 95–110)
CO2 SERPL-SCNC: 23 MMOL/L (ref 23–29)
CREAT SERPL-MCNC: 1.3 MG/DL (ref 0.5–1.4)
E COLI SXT1 STL QL IA: NEGATIVE
E COLI SXT2 STL QL IA: NEGATIVE
ERYTHROCYTE [DISTWIDTH] IN BLOOD BY AUTOMATED COUNT: 17.7 % (ref 11.5–14.5)
EST. GFR  (AFRICAN AMERICAN): 52.6 ML/MIN/1.73 M^2
EST. GFR  (NON AFRICAN AMERICAN): 45.6 ML/MIN/1.73 M^2
GLUCOSE SERPL-MCNC: 73 MG/DL (ref 70–110)
HCT VFR BLD AUTO: 24.6 % (ref 37–48.5)
HGB BLD-MCNC: 7.7 G/DL (ref 12–16)
INR PPP: 1.1 (ref 0.8–1.2)
MAGNESIUM SERPL-MCNC: 1.7 MG/DL (ref 1.6–2.6)
MCH RBC QN AUTO: 27.2 PG (ref 27–31)
MCHC RBC AUTO-ENTMCNC: 31.3 G/DL (ref 32–36)
MCV RBC AUTO: 87 FL (ref 82–98)
PHOSPHATE SERPL-MCNC: 3.7 MG/DL (ref 2.7–4.5)
PLATELET # BLD AUTO: 187 K/UL (ref 150–450)
PMV BLD AUTO: 11 FL (ref 9.2–12.9)
POTASSIUM SERPL-SCNC: 3.3 MMOL/L (ref 3.5–5.1)
PROCALCITONIN SERPL IA-MCNC: 3.71 NG/ML
PROT SERPL-MCNC: 6.1 G/DL (ref 6–8.4)
PROTHROMBIN TIME: 12.4 SEC (ref 9–12.5)
RBC # BLD AUTO: 2.83 M/UL (ref 4–5.4)
SODIUM SERPL-SCNC: 140 MMOL/L (ref 136–145)
WBC # BLD AUTO: 8.57 K/UL (ref 3.9–12.7)

## 2021-10-11 PROCEDURE — 25000003 PHARM REV CODE 250: Performed by: INTERNAL MEDICINE

## 2021-10-11 PROCEDURE — 84145 PROCALCITONIN (PCT): CPT | Performed by: STUDENT IN AN ORGANIZED HEALTH CARE EDUCATION/TRAINING PROGRAM

## 2021-10-11 PROCEDURE — 20600001 HC STEP DOWN PRIVATE ROOM

## 2021-10-11 PROCEDURE — 84100 ASSAY OF PHOSPHORUS: CPT | Performed by: INTERNAL MEDICINE

## 2021-10-11 PROCEDURE — 76937 US GUIDE VASCULAR ACCESS: CPT

## 2021-10-11 PROCEDURE — 99232 PR SUBSEQUENT HOSPITAL CARE,LEVL II: ICD-10-PCS | Mod: GT,,, | Performed by: INTERNAL MEDICINE

## 2021-10-11 PROCEDURE — C1751 CATH, INF, PER/CENT/MIDLINE: HCPCS

## 2021-10-11 PROCEDURE — 94761 N-INVAS EAR/PLS OXIMETRY MLT: CPT

## 2021-10-11 PROCEDURE — 25000003 PHARM REV CODE 250: Performed by: HOSPITALIST

## 2021-10-11 PROCEDURE — 85027 COMPLETE CBC AUTOMATED: CPT | Performed by: INTERNAL MEDICINE

## 2021-10-11 PROCEDURE — 99900035 HC TECH TIME PER 15 MIN (STAT)

## 2021-10-11 PROCEDURE — 99232 SBSQ HOSP IP/OBS MODERATE 35: CPT | Mod: ,,, | Performed by: HOSPITALIST

## 2021-10-11 PROCEDURE — 36415 COLL VENOUS BLD VENIPUNCTURE: CPT | Performed by: STUDENT IN AN ORGANIZED HEALTH CARE EDUCATION/TRAINING PROGRAM

## 2021-10-11 PROCEDURE — 63600175 PHARM REV CODE 636 W HCPCS: Performed by: INTERNAL MEDICINE

## 2021-10-11 PROCEDURE — 80053 COMPREHEN METABOLIC PANEL: CPT | Performed by: INTERNAL MEDICINE

## 2021-10-11 PROCEDURE — 99232 SBSQ HOSP IP/OBS MODERATE 35: CPT | Mod: GT,,, | Performed by: INTERNAL MEDICINE

## 2021-10-11 PROCEDURE — 85610 PROTHROMBIN TIME: CPT | Performed by: STUDENT IN AN ORGANIZED HEALTH CARE EDUCATION/TRAINING PROGRAM

## 2021-10-11 PROCEDURE — 99232 PR SUBSEQUENT HOSPITAL CARE,LEVL II: ICD-10-PCS | Mod: ,,, | Performed by: HOSPITALIST

## 2021-10-11 PROCEDURE — 83735 ASSAY OF MAGNESIUM: CPT | Performed by: INTERNAL MEDICINE

## 2021-10-11 PROCEDURE — 36410 VNPNXR 3YR/> PHY/QHP DX/THER: CPT

## 2021-10-11 PROCEDURE — 94640 AIRWAY INHALATION TREATMENT: CPT

## 2021-10-11 RX ORDER — POTASSIUM CHLORIDE 750 MG/1
30 CAPSULE, EXTENDED RELEASE ORAL EVERY 4 HOURS
Status: COMPLETED | OUTPATIENT
Start: 2021-10-11 | End: 2021-10-11

## 2021-10-11 RX ORDER — ALBUTEROL SULFATE 90 UG/1
2 AEROSOL, METERED RESPIRATORY (INHALATION) 3 TIMES DAILY PRN
Status: DISCONTINUED | OUTPATIENT
Start: 2021-10-11 | End: 2021-10-12 | Stop reason: HOSPADM

## 2021-10-11 RX ADMIN — CEFTRIAXONE 2 G: 2 INJECTION, POWDER, FOR SOLUTION INTRAMUSCULAR; INTRAVENOUS at 09:10

## 2021-10-11 RX ADMIN — OXYCODONE HYDROCHLORIDE AND ACETAMINOPHEN 500 MG: 500 TABLET ORAL at 12:10

## 2021-10-11 RX ADMIN — SODIUM BICARBONATE 1300 MG: 650 TABLET ORAL at 09:10

## 2021-10-11 RX ADMIN — CALCIUM CARBONATE (ANTACID) CHEW TAB 500 MG 1000 MG: 500 CHEW TAB at 09:10

## 2021-10-11 RX ADMIN — NEPHROCAP 1 CAPSULE: 1 CAP ORAL at 12:10

## 2021-10-11 RX ADMIN — ALBUTEROL SULFATE 2 PUFF: 108 INHALANT RESPIRATORY (INHALATION) at 12:10

## 2021-10-11 RX ADMIN — CALCIUM CARBONATE (ANTACID) CHEW TAB 500 MG 1000 MG: 500 CHEW TAB at 12:10

## 2021-10-11 RX ADMIN — OXYCODONE HYDROCHLORIDE AND ACETAMINOPHEN 500 MG: 500 TABLET ORAL at 09:10

## 2021-10-11 RX ADMIN — SODIUM BICARBONATE 1300 MG: 650 TABLET ORAL at 12:10

## 2021-10-11 RX ADMIN — POTASSIUM CHLORIDE 30 MEQ: 10 CAPSULE, COATED, EXTENDED RELEASE ORAL at 12:10

## 2021-10-11 RX ADMIN — POTASSIUM CHLORIDE 30 MEQ: 10 CAPSULE, COATED, EXTENDED RELEASE ORAL at 05:10

## 2021-10-12 VITALS
SYSTOLIC BLOOD PRESSURE: 150 MMHG | RESPIRATION RATE: 26 BRPM | WEIGHT: 185.06 LBS | HEIGHT: 64 IN | HEART RATE: 104 BPM | OXYGEN SATURATION: 99 % | TEMPERATURE: 99 F | DIASTOLIC BLOOD PRESSURE: 70 MMHG | BODY MASS INDEX: 31.59 KG/M2

## 2021-10-12 DIAGNOSIS — D62 ACUTE BLOOD LOSS ANEMIA: ICD-10-CM

## 2021-10-12 DIAGNOSIS — K57.92 DIVERTICULITIS: Primary | ICD-10-CM

## 2021-10-12 LAB
ABO + RH BLD: NORMAL
ALBUMIN SERPL BCP-MCNC: 1.7 G/DL (ref 3.5–5.2)
ALP SERPL-CCNC: 90 U/L (ref 55–135)
ALT SERPL W/O P-5'-P-CCNC: 18 U/L (ref 10–44)
ANION GAP SERPL CALC-SCNC: 13 MMOL/L (ref 8–16)
AST SERPL-CCNC: 22 U/L (ref 10–40)
BILIRUB SERPL-MCNC: 1.8 MG/DL (ref 0.1–1)
BLD GP AB SCN CELLS X3 SERPL QL: NORMAL
BUN SERPL-MCNC: 11 MG/DL (ref 6–20)
CALCIUM SERPL-MCNC: 8.4 MG/DL (ref 8.7–10.5)
CHLORIDE SERPL-SCNC: 106 MMOL/L (ref 95–110)
CO2 SERPL-SCNC: 21 MMOL/L (ref 23–29)
CREAT SERPL-MCNC: 1 MG/DL (ref 0.5–1.4)
ERYTHROCYTE [DISTWIDTH] IN BLOOD BY AUTOMATED COUNT: 18.6 % (ref 11.5–14.5)
EST. GFR  (AFRICAN AMERICAN): >60 ML/MIN/1.73 M^2
EST. GFR  (NON AFRICAN AMERICAN): >60 ML/MIN/1.73 M^2
GLUCOSE SERPL-MCNC: 80 MG/DL (ref 70–110)
HCT VFR BLD AUTO: 24.3 % (ref 37–48.5)
HCT VFR BLD AUTO: 28 % (ref 37–48.5)
HGB BLD-MCNC: 7.4 G/DL (ref 12–16)
HGB BLD-MCNC: 8.6 G/DL (ref 12–16)
INR PPP: 1.1 (ref 0.8–1.2)
MAGNESIUM SERPL-MCNC: 1.4 MG/DL (ref 1.6–2.6)
MCH RBC QN AUTO: 27.1 PG (ref 27–31)
MCHC RBC AUTO-ENTMCNC: 30.5 G/DL (ref 32–36)
MCV RBC AUTO: 89 FL (ref 82–98)
PHOSPHATE SERPL-MCNC: 2.8 MG/DL (ref 2.7–4.5)
PLATELET # BLD AUTO: 252 K/UL (ref 150–450)
PMV BLD AUTO: 11.3 FL (ref 9.2–12.9)
POTASSIUM SERPL-SCNC: 3.1 MMOL/L (ref 3.5–5.1)
PROCALCITONIN SERPL IA-MCNC: 1.87 NG/ML
PROT SERPL-MCNC: 6.3 G/DL (ref 6–8.4)
PROTHROMBIN TIME: 12.4 SEC (ref 9–12.5)
RBC # BLD AUTO: 2.73 M/UL (ref 4–5.4)
SODIUM SERPL-SCNC: 140 MMOL/L (ref 136–145)
WBC # BLD AUTO: 11.35 K/UL (ref 3.9–12.7)

## 2021-10-12 PROCEDURE — 83735 ASSAY OF MAGNESIUM: CPT | Performed by: INTERNAL MEDICINE

## 2021-10-12 PROCEDURE — 99239 PR HOSPITAL DISCHARGE DAY,>30 MIN: ICD-10-PCS | Mod: GT,,, | Performed by: INTERNAL MEDICINE

## 2021-10-12 PROCEDURE — 99239 HOSP IP/OBS DSCHRG MGMT >30: CPT | Mod: GT,,, | Performed by: INTERNAL MEDICINE

## 2021-10-12 PROCEDURE — 25000003 PHARM REV CODE 250: Performed by: HOSPITALIST

## 2021-10-12 PROCEDURE — 36415 COLL VENOUS BLD VENIPUNCTURE: CPT | Performed by: STUDENT IN AN ORGANIZED HEALTH CARE EDUCATION/TRAINING PROGRAM

## 2021-10-12 PROCEDURE — 80053 COMPREHEN METABOLIC PANEL: CPT | Performed by: INTERNAL MEDICINE

## 2021-10-12 PROCEDURE — 25000003 PHARM REV CODE 250: Performed by: INTERNAL MEDICINE

## 2021-10-12 PROCEDURE — 85018 HEMOGLOBIN: CPT | Performed by: INTERNAL MEDICINE

## 2021-10-12 PROCEDURE — 99222 PR INITIAL HOSPITAL CARE,LEVL II: ICD-10-PCS | Mod: ,,, | Performed by: INTERNAL MEDICINE

## 2021-10-12 PROCEDURE — 85014 HEMATOCRIT: CPT | Performed by: INTERNAL MEDICINE

## 2021-10-12 PROCEDURE — 84100 ASSAY OF PHOSPHORUS: CPT | Performed by: INTERNAL MEDICINE

## 2021-10-12 PROCEDURE — 36415 COLL VENOUS BLD VENIPUNCTURE: CPT | Performed by: INTERNAL MEDICINE

## 2021-10-12 PROCEDURE — 84145 PROCALCITONIN (PCT): CPT | Performed by: STUDENT IN AN ORGANIZED HEALTH CARE EDUCATION/TRAINING PROGRAM

## 2021-10-12 PROCEDURE — 85027 COMPLETE CBC AUTOMATED: CPT | Performed by: INTERNAL MEDICINE

## 2021-10-12 PROCEDURE — 99222 1ST HOSP IP/OBS MODERATE 55: CPT | Mod: ,,, | Performed by: INTERNAL MEDICINE

## 2021-10-12 PROCEDURE — 63600175 PHARM REV CODE 636 W HCPCS: Performed by: INTERNAL MEDICINE

## 2021-10-12 PROCEDURE — 85610 PROTHROMBIN TIME: CPT | Performed by: STUDENT IN AN ORGANIZED HEALTH CARE EDUCATION/TRAINING PROGRAM

## 2021-10-12 PROCEDURE — 86900 BLOOD TYPING SEROLOGIC ABO: CPT | Performed by: INTERNAL MEDICINE

## 2021-10-12 RX ORDER — LANOLIN ALCOHOL/MO/W.PET/CERES
1 CREAM (GRAM) TOPICAL SEE ADMIN INSTRUCTIONS
Qty: 60 TABLET | Refills: 3 | Status: SHIPPED | OUTPATIENT
Start: 2021-10-12

## 2021-10-12 RX ORDER — AMOXICILLIN 250 MG
1 CAPSULE ORAL 2 TIMES DAILY PRN
COMMUNITY
Start: 2021-10-12

## 2021-10-12 RX ORDER — MAGNESIUM SULFATE HEPTAHYDRATE 40 MG/ML
2 INJECTION, SOLUTION INTRAVENOUS EVERY 4 HOURS
Status: COMPLETED | OUTPATIENT
Start: 2021-10-12 | End: 2021-10-12

## 2021-10-12 RX ORDER — POTASSIUM CHLORIDE 20 MEQ/1
40 TABLET, EXTENDED RELEASE ORAL
Status: COMPLETED | OUTPATIENT
Start: 2021-10-12 | End: 2021-10-12

## 2021-10-12 RX ORDER — LANOLIN ALCOHOL/MO/W.PET/CERES
1 CREAM (GRAM) TOPICAL DAILY
Status: DISCONTINUED | OUTPATIENT
Start: 2021-10-12 | End: 2021-10-12 | Stop reason: HOSPADM

## 2021-10-12 RX ADMIN — OXYCODONE HYDROCHLORIDE AND ACETAMINOPHEN 500 MG: 500 TABLET ORAL at 10:10

## 2021-10-12 RX ADMIN — FERROUS SULFATE TAB 325 MG (65 MG ELEMENTAL FE) 1 EACH: 325 (65 FE) TAB at 01:10

## 2021-10-12 RX ADMIN — POTASSIUM CHLORIDE 40 MEQ: 1500 TABLET, EXTENDED RELEASE ORAL at 10:10

## 2021-10-12 RX ADMIN — POTASSIUM CHLORIDE 40 MEQ: 1500 TABLET, EXTENDED RELEASE ORAL at 01:10

## 2021-10-12 RX ADMIN — CEFTRIAXONE 2 G: 2 INJECTION, POWDER, FOR SOLUTION INTRAMUSCULAR; INTRAVENOUS at 04:10

## 2021-10-12 RX ADMIN — SODIUM BICARBONATE 1300 MG: 650 TABLET ORAL at 10:10

## 2021-10-12 RX ADMIN — CALCIUM CARBONATE (ANTACID) CHEW TAB 500 MG 1000 MG: 500 CHEW TAB at 10:10

## 2021-10-12 RX ADMIN — NEPHROCAP 1 CAPSULE: 1 CAP ORAL at 10:10

## 2021-10-12 RX ADMIN — MAGNESIUM SULFATE 2 G: 2 INJECTION INTRAVENOUS at 01:10

## 2021-10-12 RX ADMIN — MAGNESIUM SULFATE 2 G: 2 INJECTION INTRAVENOUS at 10:10

## 2021-10-13 LAB
BACTERIA BLD CULT: NORMAL
HGB FREE PLAS-MCNC: 19.2 MG/DL (ref 0–15.2)
OXYHGB PLAS-MCNC: 9.1 MG/DL (ref 0–12.4)

## 2021-10-13 PROCEDURE — G0180 PR HOME HEALTH MD CERTIFICATION: ICD-10-PCS | Mod: ,,, | Performed by: INTERNAL MEDICINE

## 2021-10-13 PROCEDURE — G0180 MD CERTIFICATION HHA PATIENT: HCPCS | Mod: ,,, | Performed by: INTERNAL MEDICINE

## 2021-10-16 PROCEDURE — G0180 MD CERTIFICATION HHA PATIENT: HCPCS | Mod: ,,, | Performed by: INTERNAL MEDICINE

## 2021-10-16 PROCEDURE — G0180 PR HOME HEALTH MD CERTIFICATION: ICD-10-PCS | Mod: ,,, | Performed by: INTERNAL MEDICINE

## 2021-10-20 ENCOUNTER — OFFICE VISIT (OUTPATIENT)
Dept: INFECTIOUS DISEASES | Facility: CLINIC | Age: 56
End: 2021-10-20
Payer: COMMERCIAL

## 2021-10-20 ENCOUNTER — EXTERNAL HOME HEALTH (OUTPATIENT)
Dept: HOME HEALTH SERVICES | Facility: HOSPITAL | Age: 56
End: 2021-10-20
Payer: COMMERCIAL

## 2021-10-20 VITALS
HEIGHT: 64 IN | DIASTOLIC BLOOD PRESSURE: 77 MMHG | SYSTOLIC BLOOD PRESSURE: 147 MMHG | HEART RATE: 116 BPM | BODY MASS INDEX: 29.2 KG/M2 | WEIGHT: 171.06 LBS

## 2021-10-20 DIAGNOSIS — R65.20 SEPSIS DUE TO ESCHERICHIA COLI WITH ACUTE RENAL FAILURE AND TUBULAR NECROSIS WITHOUT SEPTIC SHOCK: ICD-10-CM

## 2021-10-20 DIAGNOSIS — A41.51 SEPSIS DUE TO ESCHERICHIA COLI WITH ACUTE RENAL FAILURE AND TUBULAR NECROSIS WITHOUT SEPTIC SHOCK: ICD-10-CM

## 2021-10-20 DIAGNOSIS — N17.0 ATN (ACUTE TUBULAR NECROSIS): ICD-10-CM

## 2021-10-20 DIAGNOSIS — Z71.85 VACCINE COUNSELING: Primary | ICD-10-CM

## 2021-10-20 DIAGNOSIS — K57.92 DIVERTICULITIS: ICD-10-CM

## 2021-10-20 DIAGNOSIS — N17.0 SEPSIS DUE TO ESCHERICHIA COLI WITH ACUTE RENAL FAILURE AND TUBULAR NECROSIS WITHOUT SEPTIC SHOCK: ICD-10-CM

## 2021-10-20 PROCEDURE — 99999 PR PBB SHADOW E&M-EST. PATIENT-LVL III: ICD-10-PCS | Mod: PBBFAC,,, | Performed by: STUDENT IN AN ORGANIZED HEALTH CARE EDUCATION/TRAINING PROGRAM

## 2021-10-20 PROCEDURE — 99999 PR PBB SHADOW E&M-EST. PATIENT-LVL III: CPT | Mod: PBBFAC,,, | Performed by: STUDENT IN AN ORGANIZED HEALTH CARE EDUCATION/TRAINING PROGRAM

## 2021-10-20 PROCEDURE — 3077F PR MOST RECENT SYSTOLIC BLOOD PRESSURE >= 140 MM HG: ICD-10-PCS | Mod: CPTII,S$GLB,, | Performed by: STUDENT IN AN ORGANIZED HEALTH CARE EDUCATION/TRAINING PROGRAM

## 2021-10-20 PROCEDURE — 3078F PR MOST RECENT DIASTOLIC BLOOD PRESSURE < 80 MM HG: ICD-10-PCS | Mod: CPTII,S$GLB,, | Performed by: STUDENT IN AN ORGANIZED HEALTH CARE EDUCATION/TRAINING PROGRAM

## 2021-10-20 PROCEDURE — 90750 ZOSTER RECOMBINANT VACCINE: ICD-10-PCS | Mod: S$GLB,,, | Performed by: STUDENT IN AN ORGANIZED HEALTH CARE EDUCATION/TRAINING PROGRAM

## 2021-10-20 PROCEDURE — 1111F DSCHRG MED/CURRENT MED MERGE: CPT | Mod: CPTII,S$GLB,, | Performed by: STUDENT IN AN ORGANIZED HEALTH CARE EDUCATION/TRAINING PROGRAM

## 2021-10-20 PROCEDURE — 3077F SYST BP >= 140 MM HG: CPT | Mod: CPTII,S$GLB,, | Performed by: STUDENT IN AN ORGANIZED HEALTH CARE EDUCATION/TRAINING PROGRAM

## 2021-10-20 PROCEDURE — 1159F PR MEDICATION LIST DOCUMENTED IN MEDICAL RECORD: ICD-10-PCS | Mod: CPTII,S$GLB,, | Performed by: STUDENT IN AN ORGANIZED HEALTH CARE EDUCATION/TRAINING PROGRAM

## 2021-10-20 PROCEDURE — 90750 HZV VACC RECOMBINANT IM: CPT | Mod: S$GLB,,, | Performed by: STUDENT IN AN ORGANIZED HEALTH CARE EDUCATION/TRAINING PROGRAM

## 2021-10-20 PROCEDURE — 90471 ZOSTER RECOMBINANT VACCINE: ICD-10-PCS | Mod: S$GLB,,, | Performed by: STUDENT IN AN ORGANIZED HEALTH CARE EDUCATION/TRAINING PROGRAM

## 2021-10-20 PROCEDURE — 1159F MED LIST DOCD IN RCRD: CPT | Mod: CPTII,S$GLB,, | Performed by: STUDENT IN AN ORGANIZED HEALTH CARE EDUCATION/TRAINING PROGRAM

## 2021-10-20 PROCEDURE — 90471 IMMUNIZATION ADMIN: CPT | Mod: S$GLB,,, | Performed by: STUDENT IN AN ORGANIZED HEALTH CARE EDUCATION/TRAINING PROGRAM

## 2021-10-20 PROCEDURE — 3008F PR BODY MASS INDEX (BMI) DOCUMENTED: ICD-10-PCS | Mod: CPTII,S$GLB,, | Performed by: STUDENT IN AN ORGANIZED HEALTH CARE EDUCATION/TRAINING PROGRAM

## 2021-10-20 PROCEDURE — 99213 OFFICE O/P EST LOW 20 MIN: CPT | Mod: 25,S$GLB,, | Performed by: STUDENT IN AN ORGANIZED HEALTH CARE EDUCATION/TRAINING PROGRAM

## 2021-10-20 PROCEDURE — 1111F PR DISCHARGE MEDS RECONCILED W/ CURRENT OUTPATIENT MED LIST: ICD-10-PCS | Mod: CPTII,S$GLB,, | Performed by: STUDENT IN AN ORGANIZED HEALTH CARE EDUCATION/TRAINING PROGRAM

## 2021-10-20 PROCEDURE — 3008F BODY MASS INDEX DOCD: CPT | Mod: CPTII,S$GLB,, | Performed by: STUDENT IN AN ORGANIZED HEALTH CARE EDUCATION/TRAINING PROGRAM

## 2021-10-20 PROCEDURE — 1160F PR REVIEW ALL MEDS BY PRESCRIBER/CLIN PHARMACIST DOCUMENTED: ICD-10-PCS | Mod: CPTII,S$GLB,, | Performed by: STUDENT IN AN ORGANIZED HEALTH CARE EDUCATION/TRAINING PROGRAM

## 2021-10-20 PROCEDURE — 1160F RVW MEDS BY RX/DR IN RCRD: CPT | Mod: CPTII,S$GLB,, | Performed by: STUDENT IN AN ORGANIZED HEALTH CARE EDUCATION/TRAINING PROGRAM

## 2021-10-20 PROCEDURE — 3078F DIAST BP <80 MM HG: CPT | Mod: CPTII,S$GLB,, | Performed by: STUDENT IN AN ORGANIZED HEALTH CARE EDUCATION/TRAINING PROGRAM

## 2021-10-20 PROCEDURE — 99213 PR OFFICE/OUTPT VISIT, EST, LEVL III, 20-29 MIN: ICD-10-PCS | Mod: 25,S$GLB,, | Performed by: STUDENT IN AN ORGANIZED HEALTH CARE EDUCATION/TRAINING PROGRAM

## 2021-10-28 ENCOUNTER — EXTERNAL HOME HEALTH (OUTPATIENT)
Dept: HOME HEALTH SERVICES | Facility: HOSPITAL | Age: 56
End: 2021-10-28
Payer: COMMERCIAL

## 2023-04-17 PROBLEM — M25.611 DECREASED RANGE OF MOTION OF RIGHT SHOULDER: Status: ACTIVE | Noted: 2023-04-17

## 2023-04-17 PROBLEM — Z78.9 IMPAIRED MOBILITY AND ADLS: Status: ACTIVE | Noted: 2023-04-17

## 2023-04-17 PROBLEM — Z74.09 IMPAIRED MOBILITY AND ADLS: Status: ACTIVE | Noted: 2023-04-17

## 2023-04-17 PROBLEM — M25.511 ACUTE PAIN OF RIGHT SHOULDER: Status: ACTIVE | Noted: 2023-04-17

## 2023-04-17 PROBLEM — Z98.890 S/P RIGHT ROTATOR CUFF REPAIR: Status: ACTIVE | Noted: 2023-04-17

## 2023-04-17 PROBLEM — M62.81 MUSCLE WEAKNESS OF RIGHT UPPER EXTREMITY: Status: ACTIVE | Noted: 2023-04-17

## 2023-08-09 PROBLEM — Z98.890 S/P RIGHT ROTATOR CUFF REPAIR: Status: RESOLVED | Noted: 2023-04-17 | Resolved: 2023-08-09

## 2023-08-09 PROBLEM — M62.81 MUSCLE WEAKNESS OF RIGHT UPPER EXTREMITY: Status: RESOLVED | Noted: 2023-04-17 | Resolved: 2023-08-09

## 2023-08-09 PROBLEM — Z74.09 IMPAIRED MOBILITY AND ADLS: Status: RESOLVED | Noted: 2023-04-17 | Resolved: 2023-08-09

## 2023-08-09 PROBLEM — M25.511 ACUTE PAIN OF RIGHT SHOULDER: Status: RESOLVED | Noted: 2023-04-17 | Resolved: 2023-08-09

## 2023-08-09 PROBLEM — Z78.9 IMPAIRED MOBILITY AND ADLS: Status: RESOLVED | Noted: 2023-04-17 | Resolved: 2023-08-09

## 2023-08-09 PROBLEM — M25.611 DECREASED RANGE OF MOTION OF RIGHT SHOULDER: Status: RESOLVED | Noted: 2023-04-17 | Resolved: 2023-08-09

## 2025-03-12 PROBLEM — N32.2 BLADDER FISTULA: Status: ACTIVE | Noted: 2025-03-12

## 2025-03-12 PROBLEM — R16.0 LIVER MASS: Status: ACTIVE | Noted: 2025-03-12

## 2025-03-13 ENCOUNTER — HOSPITAL ENCOUNTER (OUTPATIENT)
Facility: HOSPITAL | Age: 60
LOS: 1 days | Discharge: HOME | End: 2025-03-15
Attending: HOSPITALIST | Admitting: COLON & RECTAL SURGERY
Payer: COMMERCIAL

## 2025-03-13 DIAGNOSIS — N32.2 BLADDER FISTULA: Primary | ICD-10-CM

## 2025-03-13 DIAGNOSIS — R07.9 CHEST PAIN: ICD-10-CM

## 2025-03-13 DIAGNOSIS — K57.92 DIVERTICULITIS: ICD-10-CM

## 2025-03-13 LAB — POCT GLUCOSE: 88 MG/DL (ref 70–110)

## 2025-03-13 PROCEDURE — 25000003 PHARM REV CODE 250: Performed by: INTERNAL MEDICINE

## 2025-03-13 PROCEDURE — 63600175 PHARM REV CODE 636 W HCPCS: Performed by: INTERNAL MEDICINE

## 2025-03-13 PROCEDURE — 20600001 HC STEP DOWN PRIVATE ROOM

## 2025-03-13 RX ORDER — ENOXAPARIN SODIUM 100 MG/ML
40 INJECTION SUBCUTANEOUS EVERY 24 HOURS
Status: DISCONTINUED | OUTPATIENT
Start: 2025-03-14 | End: 2025-03-15 | Stop reason: HOSPADM

## 2025-03-13 RX ORDER — NALOXONE HCL 0.4 MG/ML
0.02 VIAL (ML) INJECTION
Status: DISCONTINUED | OUTPATIENT
Start: 2025-03-13 | End: 2025-03-14

## 2025-03-13 RX ORDER — ONDANSETRON HYDROCHLORIDE 2 MG/ML
4 INJECTION, SOLUTION INTRAVENOUS EVERY 8 HOURS PRN
Status: DISCONTINUED | OUTPATIENT
Start: 2025-03-13 | End: 2025-03-15 | Stop reason: HOSPADM

## 2025-03-13 RX ORDER — IBUPROFEN 200 MG
16 TABLET ORAL
Status: DISCONTINUED | OUTPATIENT
Start: 2025-03-13 | End: 2025-03-14

## 2025-03-13 RX ORDER — HYDROCODONE BITARTRATE AND ACETAMINOPHEN 5; 325 MG/1; MG/1
1 TABLET ORAL EVERY 6 HOURS PRN
Refills: 0 | Status: DISCONTINUED | OUTPATIENT
Start: 2025-03-13 | End: 2025-03-14

## 2025-03-13 RX ORDER — GLUCAGON 1 MG
1 KIT INJECTION
Status: DISCONTINUED | OUTPATIENT
Start: 2025-03-13 | End: 2025-03-14

## 2025-03-13 RX ORDER — ACETAMINOPHEN 325 MG/1
650 TABLET ORAL EVERY 4 HOURS PRN
Status: DISCONTINUED | OUTPATIENT
Start: 2025-03-13 | End: 2025-03-14

## 2025-03-13 RX ORDER — IBUPROFEN 200 MG
24 TABLET ORAL
Status: DISCONTINUED | OUTPATIENT
Start: 2025-03-13 | End: 2025-03-14

## 2025-03-13 RX ORDER — MORPHINE SULFATE 2 MG/ML
2 INJECTION, SOLUTION INTRAMUSCULAR; INTRAVENOUS EVERY 4 HOURS PRN
Refills: 0 | Status: DISCONTINUED | OUTPATIENT
Start: 2025-03-13 | End: 2025-03-14

## 2025-03-13 RX ORDER — AMOXICILLIN 250 MG
1 CAPSULE ORAL 2 TIMES DAILY PRN
Status: DISCONTINUED | OUTPATIENT
Start: 2025-03-13 | End: 2025-03-14

## 2025-03-13 RX ORDER — SODIUM CHLORIDE 9 MG/ML
INJECTION, SOLUTION INTRAVENOUS CONTINUOUS
Status: DISCONTINUED | OUTPATIENT
Start: 2025-03-13 | End: 2025-03-14

## 2025-03-13 RX ORDER — HYDROCODONE BITARTRATE AND ACETAMINOPHEN 500; 5 MG/1; MG/1
TABLET ORAL
Status: DISCONTINUED | OUTPATIENT
Start: 2025-03-13 | End: 2025-03-14

## 2025-03-13 RX ORDER — SODIUM CHLORIDE 0.9 % (FLUSH) 0.9 %
10 SYRINGE (ML) INJECTION EVERY 8 HOURS PRN
Status: DISCONTINUED | OUTPATIENT
Start: 2025-03-13 | End: 2025-03-15 | Stop reason: HOSPADM

## 2025-03-13 RX ORDER — TALC
6 POWDER (GRAM) TOPICAL NIGHTLY PRN
Status: DISCONTINUED | OUTPATIENT
Start: 2025-03-13 | End: 2025-03-15 | Stop reason: HOSPADM

## 2025-03-13 RX ADMIN — SODIUM CHLORIDE: 9 INJECTION, SOLUTION INTRAVENOUS at 09:03

## 2025-03-13 RX ADMIN — MELATONIN TAB 3 MG 6 MG: 3 TAB at 09:03

## 2025-03-13 RX ADMIN — MORPHINE SULFATE 2 MG: 2 INJECTION, SOLUTION INTRAMUSCULAR; INTRAVENOUS at 09:03

## 2025-03-14 PROBLEM — E83.39 HYPOPHOSPHATEMIA: Status: ACTIVE | Noted: 2025-03-14

## 2025-03-14 LAB
ALBUMIN SERPL BCP-MCNC: 2.3 G/DL (ref 3.5–5.2)
ALP SERPL-CCNC: 63 U/L (ref 40–150)
ALT SERPL W/O P-5'-P-CCNC: 8 U/L (ref 10–44)
ANION GAP SERPL CALC-SCNC: 11 MMOL/L (ref 8–16)
AST SERPL-CCNC: 17 U/L (ref 10–40)
BASOPHILS # BLD AUTO: 0.06 K/UL (ref 0–0.2)
BASOPHILS NFR BLD: 0.6 % (ref 0–1.9)
BILIRUB SERPL-MCNC: 0.4 MG/DL (ref 0.1–1)
BUN SERPL-MCNC: 10 MG/DL (ref 8–23)
CALCIUM SERPL-MCNC: 8.5 MG/DL (ref 8.7–10.5)
CHLORIDE SERPL-SCNC: 111 MMOL/L (ref 95–110)
CO2 SERPL-SCNC: 19 MMOL/L (ref 23–29)
CREAT SERPL-MCNC: 0.8 MG/DL (ref 0.5–1.4)
DIFFERENTIAL METHOD BLD: ABNORMAL
EOSINOPHIL # BLD AUTO: 0.3 K/UL (ref 0–0.5)
EOSINOPHIL NFR BLD: 3.2 % (ref 0–8)
ERYTHROCYTE [DISTWIDTH] IN BLOOD BY AUTOMATED COUNT: 15.3 % (ref 11.5–14.5)
EST. GFR  (NO RACE VARIABLE): >60 ML/MIN/1.73 M^2
FERRITIN SERPL-MCNC: 966 NG/ML (ref 20–300)
GLUCOSE SERPL-MCNC: 110 MG/DL (ref 70–110)
HCT VFR BLD AUTO: 25.6 % (ref 37–48.5)
HGB BLD-MCNC: 7.8 G/DL (ref 12–16)
IMM GRANULOCYTES # BLD AUTO: 0.11 K/UL (ref 0–0.04)
IMM GRANULOCYTES NFR BLD AUTO: 1.1 % (ref 0–0.5)
IRON SERPL-MCNC: 32 UG/DL (ref 30–160)
LYMPHOCYTES # BLD AUTO: 2.6 K/UL (ref 1–4.8)
LYMPHOCYTES NFR BLD: 26.2 % (ref 18–48)
MAGNESIUM SERPL-MCNC: 1.9 MG/DL (ref 1.6–2.6)
MCH RBC QN AUTO: 28.1 PG (ref 27–31)
MCHC RBC AUTO-ENTMCNC: 30.5 G/DL (ref 32–36)
MCV RBC AUTO: 92 FL (ref 82–98)
MONOCYTES # BLD AUTO: 0.7 K/UL (ref 0.3–1)
MONOCYTES NFR BLD: 6.8 % (ref 4–15)
NEUTROPHILS # BLD AUTO: 6.2 K/UL (ref 1.8–7.7)
NEUTROPHILS NFR BLD: 62.1 % (ref 38–73)
NRBC BLD-RTO: 0 /100 WBC
PHOSPHATE SERPL-MCNC: 2.6 MG/DL (ref 2.7–4.5)
PLATELET # BLD AUTO: 471 K/UL (ref 150–450)
PMV BLD AUTO: 10.1 FL (ref 9.2–12.9)
POTASSIUM SERPL-SCNC: 3.5 MMOL/L (ref 3.5–5.1)
PROT SERPL-MCNC: 7.4 G/DL (ref 6–8.4)
RBC # BLD AUTO: 2.78 M/UL (ref 4–5.4)
SATURATED IRON: 13 % (ref 20–50)
SODIUM SERPL-SCNC: 141 MMOL/L (ref 136–145)
TOTAL IRON BINDING CAPACITY: 238 UG/DL (ref 250–450)
TRANSFERRIN SERPL-MCNC: 161 MG/DL (ref 200–375)
TRANSFERRIN SERPL-MCNC: 161 MG/DL (ref 200–375)
WBC # BLD AUTO: 9.92 K/UL (ref 3.9–12.7)

## 2025-03-14 PROCEDURE — 82728 ASSAY OF FERRITIN: CPT | Performed by: STUDENT IN AN ORGANIZED HEALTH CARE EDUCATION/TRAINING PROGRAM

## 2025-03-14 PROCEDURE — 99222 1ST HOSP IP/OBS MODERATE 55: CPT | Mod: ,,, | Performed by: COLON & RECTAL SURGERY

## 2025-03-14 PROCEDURE — 84466 ASSAY OF TRANSFERRIN: CPT | Performed by: STUDENT IN AN ORGANIZED HEALTH CARE EDUCATION/TRAINING PROGRAM

## 2025-03-14 PROCEDURE — 63600175 PHARM REV CODE 636 W HCPCS: Performed by: INTERNAL MEDICINE

## 2025-03-14 PROCEDURE — 84100 ASSAY OF PHOSPHORUS: CPT | Performed by: INTERNAL MEDICINE

## 2025-03-14 PROCEDURE — 25000003 PHARM REV CODE 250

## 2025-03-14 PROCEDURE — 25000003 PHARM REV CODE 250: Performed by: INTERNAL MEDICINE

## 2025-03-14 PROCEDURE — 25000003 PHARM REV CODE 250: Performed by: STUDENT IN AN ORGANIZED HEALTH CARE EDUCATION/TRAINING PROGRAM

## 2025-03-14 PROCEDURE — 83735 ASSAY OF MAGNESIUM: CPT | Performed by: INTERNAL MEDICINE

## 2025-03-14 PROCEDURE — 36415 COLL VENOUS BLD VENIPUNCTURE: CPT | Performed by: INTERNAL MEDICINE

## 2025-03-14 PROCEDURE — 25000003 PHARM REV CODE 250: Performed by: COMMUNITY HEALTH WORKER

## 2025-03-14 PROCEDURE — 99203 OFFICE O/P NEW LOW 30 MIN: CPT | Mod: ,,, | Performed by: UROLOGY

## 2025-03-14 PROCEDURE — 85025 COMPLETE CBC W/AUTO DIFF WBC: CPT | Performed by: INTERNAL MEDICINE

## 2025-03-14 PROCEDURE — G0378 HOSPITAL OBSERVATION PER HR: HCPCS

## 2025-03-14 PROCEDURE — 80053 COMPREHEN METABOLIC PANEL: CPT | Performed by: INTERNAL MEDICINE

## 2025-03-14 PROCEDURE — 36415 COLL VENOUS BLD VENIPUNCTURE: CPT | Performed by: STUDENT IN AN ORGANIZED HEALTH CARE EDUCATION/TRAINING PROGRAM

## 2025-03-14 PROCEDURE — 96372 THER/PROPH/DIAG INJ SC/IM: CPT | Performed by: INTERNAL MEDICINE

## 2025-03-14 RX ORDER — PROCHLORPERAZINE EDISYLATE 5 MG/ML
5 INJECTION INTRAMUSCULAR; INTRAVENOUS EVERY 6 HOURS PRN
Status: DISCONTINUED | OUTPATIENT
Start: 2025-03-14 | End: 2025-03-14

## 2025-03-14 RX ORDER — IBUPROFEN 200 MG
16 TABLET ORAL
Status: DISCONTINUED | OUTPATIENT
Start: 2025-03-14 | End: 2025-03-14

## 2025-03-14 RX ORDER — GLUCAGON 1 MG
1 KIT INJECTION
Status: DISCONTINUED | OUTPATIENT
Start: 2025-03-14 | End: 2025-03-14

## 2025-03-14 RX ORDER — LANOLIN ALCOHOL/MO/W.PET/CERES
1 CREAM (GRAM) TOPICAL SEE ADMIN INSTRUCTIONS
Status: DISCONTINUED | OUTPATIENT
Start: 2025-03-14 | End: 2025-03-14

## 2025-03-14 RX ORDER — IBUPROFEN 200 MG
24 TABLET ORAL
Status: DISCONTINUED | OUTPATIENT
Start: 2025-03-14 | End: 2025-03-14

## 2025-03-14 RX ORDER — OXYCODONE HYDROCHLORIDE 5 MG/1
5 TABLET ORAL EVERY 6 HOURS PRN
Refills: 0 | Status: DISCONTINUED | OUTPATIENT
Start: 2025-03-14 | End: 2025-03-15 | Stop reason: HOSPADM

## 2025-03-14 RX ORDER — CIPROFLOXACIN 500 MG/1
500 TABLET ORAL 2 TIMES DAILY
Qty: 2 TABLET | Refills: 0 | Status: SHIPPED | OUTPATIENT
Start: 2025-03-14 | End: 2025-03-15 | Stop reason: HOSPADM

## 2025-03-14 RX ORDER — INSULIN ASPART 100 [IU]/ML
0-5 INJECTION, SOLUTION INTRAVENOUS; SUBCUTANEOUS
Status: DISCONTINUED | OUTPATIENT
Start: 2025-03-14 | End: 2025-03-14

## 2025-03-14 RX ORDER — LANOLIN ALCOHOL/MO/W.PET/CERES
1 CREAM (GRAM) TOPICAL DAILY
Status: DISCONTINUED | OUTPATIENT
Start: 2025-03-14 | End: 2025-03-15 | Stop reason: HOSPADM

## 2025-03-14 RX ORDER — SODIUM CHLORIDE 0.9 % (FLUSH) 0.9 %
10 SYRINGE (ML) INJECTION EVERY 12 HOURS PRN
Status: DISCONTINUED | OUTPATIENT
Start: 2025-03-14 | End: 2025-03-14

## 2025-03-14 RX ORDER — ACETAMINOPHEN 500 MG
1000 TABLET ORAL EVERY 8 HOURS PRN
Status: DISCONTINUED | OUTPATIENT
Start: 2025-03-14 | End: 2025-03-14

## 2025-03-14 RX ORDER — SODIUM,POTASSIUM PHOSPHATES 280-250MG
1 POWDER IN PACKET (EA) ORAL EVERY 4 HOURS
Status: COMPLETED | OUTPATIENT
Start: 2025-03-14 | End: 2025-03-14

## 2025-03-14 RX ORDER — ACETAMINOPHEN 500 MG
1000 TABLET ORAL EVERY 8 HOURS
Status: DISCONTINUED | OUTPATIENT
Start: 2025-03-14 | End: 2025-03-15 | Stop reason: HOSPADM

## 2025-03-14 RX ADMIN — PIPERACILLIN SODIUM AND TAZOBACTAM SODIUM 4.5 G: 4; .5 INJECTION, POWDER, FOR SOLUTION INTRAVENOUS at 03:03

## 2025-03-14 RX ADMIN — MELATONIN TAB 3 MG 6 MG: 3 TAB at 09:03

## 2025-03-14 RX ADMIN — ACETAMINOPHEN 1000 MG: 500 TABLET ORAL at 09:03

## 2025-03-14 RX ADMIN — HYDROCODONE BITARTRATE AND ACETAMINOPHEN 1 TABLET: 5; 325 TABLET ORAL at 08:03

## 2025-03-14 RX ADMIN — SODIUM CHLORIDE: 9 INJECTION, SOLUTION INTRAVENOUS at 11:03

## 2025-03-14 RX ADMIN — PIPERACILLIN SODIUM AND TAZOBACTAM SODIUM 4.5 G: 4; .5 INJECTION, POWDER, FOR SOLUTION INTRAVENOUS at 06:03

## 2025-03-14 RX ADMIN — THERA TABS 1 TABLET: TAB at 04:03

## 2025-03-14 RX ADMIN — MORPHINE SULFATE 2 MG: 2 INJECTION, SOLUTION INTRAMUSCULAR; INTRAVENOUS at 11:03

## 2025-03-14 RX ADMIN — POTASSIUM & SODIUM PHOSPHATES POWDER PACK 280-160-250 MG 1 PACKET: 280-160-250 PACK at 06:03

## 2025-03-14 RX ADMIN — POTASSIUM & SODIUM PHOSPHATES POWDER PACK 280-160-250 MG 1 PACKET: 280-160-250 PACK at 04:03

## 2025-03-14 RX ADMIN — ENOXAPARIN SODIUM 40 MG: 40 INJECTION SUBCUTANEOUS at 04:03

## 2025-03-14 RX ADMIN — PIPERACILLIN SODIUM AND TAZOBACTAM SODIUM 4.5 G: 4; .5 INJECTION, POWDER, FOR SOLUTION INTRAVENOUS at 10:03

## 2025-03-14 RX ADMIN — OXYCODONE 5 MG: 5 TABLET ORAL at 04:03

## 2025-03-14 RX ADMIN — ACETAMINOPHEN 1000 MG: 500 TABLET ORAL at 04:03

## 2025-03-14 RX ADMIN — FERROUS SULFATE TAB EC 325 MG (65 MG FE EQUIVALENT) 1 EACH: 325 (65 FE) TABLET DELAYED RESPONSE at 10:03

## 2025-03-14 RX ADMIN — MORPHINE SULFATE 2 MG: 2 INJECTION, SOLUTION INTRAMUSCULAR; INTRAVENOUS at 03:03

## 2025-03-14 RX ADMIN — OXYCODONE 5 MG: 5 TABLET ORAL at 09:03

## 2025-03-14 RX ADMIN — POTASSIUM & SODIUM PHOSPHATES POWDER PACK 280-160-250 MG 1 PACKET: 280-160-250 PACK at 10:03

## 2025-03-14 NOTE — HPI
Ms. Robb is a 59 yo female with hypertension, chronic diastolic heart failure, diverticulitis, and previous bladder fistula that presented to the [facility] ER with abdominal pain, diarrhea, urinary retention and dysuria. She reports that she has had general malaise that has been worsening over the last week with mild abdominal pain, multiple episodes of loose stool, difficulty urinating and painful urination. When her symptoms were not improving, patient presented to the ER for further evaluation. She was noted to have mild tenderness to palpation on exam. CT of the abdomen showed findings suggestive of bladder fistula communicating with in the pelvis with the sigmoid colon and a new indeterminate right hepatic lobe lesion raising concern for malignant process with MRI follow-up recommended. ER provider reached out to colorectal surgery at [facility] (Dr. Abhijit Wharton) for transfer earlier in the night but no beds were available then. He recommended to initiate IV antibiotics, and zosyn was started. She was admitted to [facility] while awaiting a bed at [facility]. Transfer to [facility] is requested for evaluation and management of bladder fistula and liver mass.

## 2025-03-14 NOTE — CONSULTS
[facility] - Telemetry Stepdown  Colorectal Surgery  Consult Note    Patient Name: Lorraine Robb  MRN: [id number]  Admission Date: 3/13/2025  Hospital Length of Stay: 1 days  Attending Physician: Tank Plaza DO  Primary Care Provider: Pardeep Kay MD    Inpatient consult to Colorectal Surgery  Consult performed by: Lashanda Melendez MD  Consult ordered by: Katherine Degroot PA-C        Subjective:     Chief Complaint/Reason for Admission: fistula    History of Present Illness:  61yo F with possible colovesical fistula. On 3/10 noticed pneumaturia and fecaluria, dysuria, diarrhea, and some abdominal pain. Went to the ED for evaluation on 3/12 when it was not improving - she had a suspicion this was another diverticulitis flare. Hx of pelvic abscesses in 2015, CT read with possible TOA and PID, was treated as perforated diverticulitis and IR drain was placed with purulent output and growing GPC and GPR. She had another flare in 2021 when she had similar symptoms but also febrile, chills, and weak from sepsis, but improved without surgical intervention. Ct showed fistula to the bladder with air, cystitis, and a collection surrounding the uterus but possibly related to the sigmoid colon. A hepatic lesion was also seen but considered cystis benign per MRI. Since admission (and transfer yesterday from [place]) on [facility], she is hungry and tolerating solid foods, having soft formed bowel movements again. Denies nausea bloating or abdominal pain, but still having gas pass with her urine and sediment in the toilet, denies hematuria or hematochezia or vaginal drainage. Says she is up to date on mammograms and gyn exams. Take oral iron with prune juice and has dark stool. Walks ~5mi a day working as a , daughter as bedside is a nurse. Last colonoscopy was maybe 7 years ago with benign polyps per patient but she is not certain. Denies family history of CRC or IBD.    PTA Medications   Medication Sig     ferrous sulfate (FEOSOL) Tab tablet Take 1 tablet (1 each total) by mouth As instructed (Take one tab by mouth with meal once daily for 7 days then increase to 1 tab twice daily with meals).    lisinopril 10 MG tablet Take 10 mg by mouth once daily.    senna-docusate 8.6-50 mg (SENOKOT-S) 8.6-50 mg per tablet Take 1 tablet by mouth 2 (two) times daily as needed for Constipation. (Patient not taking: No sig reported)       Review of patient's allergies indicates:   Allergen Reactions    Flagyl [metronidazole hcl] Itching and Dermatitis       Past Medical History:   Diagnosis Date    Diverticulitis     Hypertension      Past Surgical History:  R rotator cuff repair    Family History       Problem Relation (Age of Onset)    Cancer Sister    Diabetes Mother, Sister    Hypertension Mother          Tobacco Use    Smoking status: Former     Current packs/day: 0.00     Types: Cigarettes     Quit date: 2017     Years since quittin.1    Smokeless tobacco: Never   Substance and Sexual Activity    Alcohol use: No    Drug use: Not Currently    Sexual activity: Not on file     Review of Systems   Genitourinary:         Pneumaturia and sediment in urine   All other systems reviewed and are negative.    Objective:     Vital Signs (Most Recent):  Temp: 98.8 °F (37.1 °C) (25 0734)  Pulse: 82 (25 0734)  Resp: 16 (25 0846)  BP: 120/72 (25 0734)  SpO2: 98 % (25 0734) Vital Signs (24h Range):  Temp:  [98 °F (36.7 °C)-98.9 °F (37.2 °C)] 98.8 °F (37.1 °C)  Pulse:  [81-93] 82  Resp:  [16-24] 16  SpO2:  [95 %-100 %] 98 %  BP: (100-132)/(51-72) 120/72     Weight: 76 kg (167 lb 8.8 oz)  Body mass index is 27.88 kg/m².     Physical Exam  Constitutional:       General: She is not in acute distress.     Appearance: Normal appearance. She is not ill-appearing or toxic-appearing.   HENT:      Head: Normocephalic.      Mouth/Throat:      Mouth: Mucous membranes are moist.      Pharynx: Oropharynx is clear.    Eyes:      Extraocular Movements: Extraocular movements intact.      Conjunctiva/sclera: Conjunctivae normal.   Cardiovascular:      Rate and Rhythm: Normal rate and regular rhythm.   Pulmonary:      Effort: Pulmonary effort is normal. No respiratory distress.   Abdominal:      General: There is no distension.      Palpations: Abdomen is soft.      Tenderness: There is no abdominal tenderness. There is no guarding.   Musculoskeletal:         General: No swelling.   Skin:     General: Skin is warm and dry.   Neurological:      General: No focal deficit present.      Mental Status: She is alert.   Psychiatric:         Mood and Affect: Mood normal.         Behavior: Behavior normal.         Thought Content: Thought content normal.         Judgment: Judgment normal.        Significant Labs:  BMP (Last 3 Results):   Recent Labs   Lab 03/12/25  1414 03/13/25  0602 03/14/25  0646   GLU 90 88 110    138 141   K 3.6 3.2* 3.5    107 111*   CO2 23 22* 19*   BUN 11 14 10   CREATININE 1.1 1.1 0.8   CALCIUM 9.8 9.0 8.5*   MG  --  2.0 1.9     CBC (Last 3 Results):   Recent Labs   Lab 03/12/25  1414 03/13/25  0602 03/14/25  0646   WBC 11.92 9.83 9.92   RBC 2.77* 2.34* 2.78*   HGB 7.8* 6.6* 7.8*   HCT 25.3* 21.5* 25.6*   * 473* 471*   MCV 91 92 92   MCH 28.2 28.2 28.1   MCHC 30.8* 30.7* 30.5*       Significant Diagnostics:  CT reviewed 3/12    The bladder is collapsed containing a moderate amount of air.  There is bladder wall thickening with haziness of the surrounding fat.  In addition there appears to be a defect within the posterior wall of the bladder on sagittal image 78 of series 602 which communicates with a curvilinear debris filled collection that is located both posterior to the bladder and appears to surround the uterus.  It appears distinctly separate from the adjacent bowel but may communicate with the sigmoid colon.  This collection was present on the previous study.  On today's study the  collection measures approximately 8.0 x 4.1 cm.  There is colonic diverticulosis.  As described above there is a curvilinear collection that partially surrounds the uterus and appears to demonstrate a fistula to the abnormal appearing bladder.  Further clinical evaluation is advised.    Findings suggestive of a bladder fistula communicating with a curvilinear debris filled collection within the pelvis that may communicate with the sigmoid colon.  In addition the bladder wall appears thickened and there is stranding of the surrounding fat which could indicate superimposed chronic or acute cystitis.  Findings must be correlated with clinical circumstances.   New indeterminate right hepatic lobe lesion raising concern for a malignant process based upon CT findings.  Recommend follow-up MRI of the liver without and with intravenous contrast.    Assessment/Plan:     Renal/  * Bladder fistula  59yo F with concern for colouterine to bladder fistula. HD stable and no leukocytosis, is comfortable - no urgent surgical intervention. In comparing CT from 2015 and 2021, evolving collection also involves uterus and adjacent to sigmoid colon, most recent CT with posterior bladder defect to this collection. She will need a sigmoidectomy and hysterectomy - patient states she wants a hysterectomy regardless, and this would be done in coordination with obgyn - and is high risk for needing an ostomy.    We can transfer to [facility] as primary service, continue abx, consult nutrition to optimize her given anemia and low albumin and work toward a discharge with elective resection planned.      Thank you for your consult. I will follow-up with patient. Please contact us if you have any additional questions.    Lashanda Melendez MD  Colorectal Surgery  [facility] - Telemetry Stepdown

## 2025-03-14 NOTE — HPI
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 On 3/10 noticed pneumaturia and fecaluria, dysuria, diarrhea, and some abdominal pain. Went to the ED for evaluation on 3/12 when it was not improving - she had a suspicion this was another diverticulitis flare. Hx of pelvic abscesses in 2015, CT read with possible TOA and PID, was treated as perforated diverticulitis and IR drain was placed with purulent output and growing GPC and GPR. She had another flare in 2021 when she had similar symptoms but also febrile, chills, and weak from sepsis, but improved without surgical intervention. Ct showed fistula to the bladder with air, cystitis, and a collection surrounding the uterus but possibly related to the sigmoid colon. A hepatic lesion was also seen but considered cystis benign per MRI. Since admission (and transfer yesterday from [place]) on [facility], she is hungry and tolerating solid foods, having soft formed bowel movements again. Denies nausea bloating or abdominal pain, but still having gas pass with her urine and sediment in the toilet, denies hematuria or hematochezia or vaginal drainage. Says she is up to date on mammograms and gyn exams. Take oral iron with prune juice and has dark stool. Walks ~5mi a day working as a , daughter as bedside is a nurse. Last colonoscopy was maybe 7 years ago with benign polyps per patient but she is not certain. Denies family history of CRC or IBD.

## 2025-03-14 NOTE — PLAN OF CARE
Problem: Adult Inpatient Plan of Care  Goal: Plan of Care Review  Outcome: Progressing  Goal: Patient-Specific Goal (Individualized)  Outcome: Progressing  Goal: Absence of Hospital-Acquired Illness or Injury  Outcome: Progressing  Goal: Optimal Comfort and Wellbeing  Outcome: Progressing  Goal: Readiness for Transition of Care  Outcome: Progressing     Problem: Sepsis/Septic Shock  Goal: Optimal Coping  Outcome: Progressing  Goal: Absence of Bleeding  Outcome: Progressing  Goal: Blood Glucose Level Within Targeted Range  Outcome: Progressing  Goal: Absence of Infection Signs and Symptoms  Outcome: Progressing  Goal: Optimal Nutrition Intake  Outcome: Progressing     Problem: Acute Kidney Injury/Impairment  Goal: Fluid and Electrolyte Balance  Outcome: Progressing  Goal: Improved Oral Intake  Outcome: Progressing  Goal: Effective Renal Function  Outcome: Progressing     Problem: Infection  Goal: Absence of Infection Signs and Symptoms  Outcome: Progressing

## 2025-03-14 NOTE — ASSESSMENT & PLAN NOTE
"- patient with reports of abdominal pain and stool in urine for the last few days   - reports bladder fistula in past which reports spontaneously "resolved"   - CT with findings suggestive of a bladder fistula communicating with a curvilinear debris filled collection within the pelvis that may communicate with the sigmoid colon. In addition the bladder wall appears thickened and there is stranding of the surrounding fat which could indicate superimposed chronic or acute cystitis.   - patient initiated on IV zosyn, continue for now  - blood cultures pending  - stool studies collected at [facility], negative for C.diff  - urology consulted, appreciate recommendations  - [facility] consulted, appreciate recommendations   "

## 2025-03-14 NOTE — ASSESSMENT & PLAN NOTE
- CT scan shows significant liver mass concerning for possible malignancy.    - MRI of the abdomen obtained: two hepatic lesions compatible with cysts, no suspicious hepatic lesions

## 2025-03-14 NOTE — PLAN OF CARE
Problem: Adult Inpatient Plan of Care  Goal: Plan of Care Review  Outcome: Progressing  Goal: Patient-Specific Goal (Individualized)  Outcome: Progressing  Goal: Absence of Hospital-Acquired Illness or Injury  Outcome: Progressing  Goal: Optimal Comfort and Wellbeing  Outcome: Progressing  Goal: Readiness for Transition of Care  Outcome: Progressing     Problem: Sepsis/Septic Shock  Goal: Optimal Coping  Outcome: Progressing  Goal: Absence of Bleeding  Outcome: Progressing  Goal: Blood Glucose Level Within Targeted Range  Outcome: Progressing  Goal: Absence of Infection Signs and Symptoms  Outcome: Progressing  Goal: Optimal Nutrition Intake  Outcome: Progressing     Problem: Acute Kidney Injury/Impairment  Goal: Fluid and Electrolyte Balance  Outcome: Progressing  Goal: Improved Oral Intake  Outcome: Progressing  Goal: Effective Renal Function  Outcome: Progressing     Problem: Infection  Goal: Absence of Infection Signs and Symptoms  Outcome: Progressing     Patient remains free from falls and injury. NADN. VSS. Safety maintained; bed low and locked, call light in reach.  No complaint of pain, n/v, diarrhea, or SOB. Questions encouraged and answered. Plan of care reviewed with patient. Will continue to monitor, will continue with plan of care.

## 2025-03-14 NOTE — PLAN OF CARE
Patient seen by [facility] for fistula management.      Liver Mass is benign cysts, no further work up at this time  VIVIANA - continue iron supplementation        Plan for transfer primary service to [facility] at this time. Please reach out to there service for any concerns moving forward.     - medicine team will sign off. Please reach out for any questions or concerns        [facility]

## 2025-03-14 NOTE — ASSESSMENT & PLAN NOTE
59yo F with concern for colouterine to bladder fistula. HD stable and no leukocytosis, is comfortable - no urgent surgical intervention. In comparing CT from 2015 and 2021, evolving collection also involves uterus and adjacent to sigmoid colon, most recent CT with posterior bladder defect to this collection. She will need a sigmoidectomy and hysterectomy - patient states she wants a hysterectomy regardless, and this would be done in coordination with obgyn - and is high risk for needing an ostomy.    We can transfer to [facility] as primary service, continue abx, consult nutrition to optimize her given anemia and low albumin and work toward a discharge with elective resection planned.

## 2025-03-14 NOTE — ASSESSMENT & PLAN NOTE
61yo F with concern for colovesical fistula. HD stable and no leukocytosis, is comfortable - no urgent surgical intervention. In comparing CT from 2015 and 2021, evolving collection also involves uterus and adjacent to sigmoid colon, most recent CT with posterior bladder defect to this collection. She will need a sigmoidectomy and hysterectomy - patient states she wants a hysterectomy regardless, and this would be done in coordination with obgyn - and is high risk for needing an ostomy.    At this time, continue abx and recommend nutrition consult to optimize nutrition given anemia and low albumin and we will continue to follow.

## 2025-03-14 NOTE — CONSULTS
[facility] - Telemetry Stepdown  Urology  Consult Note    Patient Name: Lorraine Robb  MRN: [id number]  Admission Date: 3/13/2025  Hospital Length of Stay: 1   Code Status: Full Code   Attending Provider: DAVE Stone MD   Consulting Provider: GIOVANNI LUCAS MD  Primary Care Physician: Pardeep Kay MD  Principal Problem:Bladder fistula    Inpatient consult to Urology  Consult performed by: Giovanni Lucas MD  Consult ordered by: Tank Plaza DO  Reason for consult: pnuematuria          Subjective:     HPI:   Ms. Robb is a 59 yo female with hypertension, chronic diastolic heart failure, diverticulitis, history of pelvic abscess. Urology consulted for concern for pneumaturia.    She has a history of a perforated diverticulitis in  and IR pelvic drain was placed. She had another flare in  that improved without surgical intervention.     On assessment, patient is afebrile, VSS. She denies abdominal pain, pelvic pain, hematuria, or incomplete bladder emptying. She does complain of urgency and frequency.   CT w/ contrast revealing a 1 cm right lower pole kidney stone. No hydronephrosis bilaterally. There is a 8 x 4 cm calcified mass that appears to be within the uterus. There is a linear tract with air at the posterior bladder wall that may represent a fistulous tract.    Her UA has occasional yeast, occasional bacteria, 1 WBC, 3 SECs, nitrite negative     Past Medical History:   Diagnosis Date    Diverticulitis     Hypertension        No past surgical history on file.    Review of patient's allergies indicates:   Allergen Reactions    Flagyl [metronidazole hcl] Itching and Dermatitis       Family History       Problem Relation (Age of Onset)    Cancer Sister    Diabetes Mother, Sister    Hypertension Mother            Tobacco Use    Smoking status: Former     Current packs/day: 0.00     Types: Cigarettes     Quit date: 2017     Years since quittin.1    Smokeless tobacco: Never    Substance and Sexual Activity    Alcohol use: No    Drug use: Not Currently    Sexual activity: Not on file       Review of Systems   Constitutional:  Negative for appetite change, chills and fever.   HENT:  Negative for congestion and sore throat.    Respiratory:  Negative for cough and wheezing.    Gastrointestinal:  Negative for abdominal pain, nausea and vomiting.   Genitourinary:  Positive for frequency. Negative for flank pain and hematuria.   Neurological:  Negative for headaches.   Psychiatric/Behavioral:  Negative for agitation.        Objective:     Temp:  [98 °F (36.7 °C)-98.9 °F (37.2 °C)] 98.8 °F (37.1 °C)  Pulse:  [81-93] 82  Resp:  [16-24] 16  SpO2:  [95 %-100 %] 98 %  BP: (100-132)/(51-72) 120/72  Weight: 76 kg (167 lb 8.8 oz)  Body mass index is 27.88 kg/m².           Drains       None                    Physical Exam  Constitutional:       General: She is not in acute distress.  HENT:      Head: Normocephalic.   Cardiovascular:      Rate and Rhythm: Normal rate.   Pulmonary:      Effort: Pulmonary effort is normal.   Abdominal:      General: There is no distension.      Palpations: Abdomen is soft.      Tenderness: There is no abdominal tenderness. There is no right CVA tenderness or left CVA tenderness.   Genitourinary:     Comments: Post void bladder scan 16cc  Musculoskeletal:         General: Normal range of motion.   Skin:     General: Skin is warm and dry.   Neurological:      Mental Status: She is alert.      Coordination: Coordination normal.   Psychiatric:         Behavior: Behavior normal.          Significant Labs:    BMP:  Recent Labs   Lab 03/12/25  1414 03/13/25  0602 03/14/25  0646    138 141   K 3.6 3.2* 3.5    107 111*   CO2 23 22* 19*   BUN 11 14 10   CREATININE 1.1 1.1 0.8   CALCIUM 9.8 9.0 8.5*       CBC:  Recent Labs   Lab 03/12/25  1414 03/13/25  0602 03/14/25  0646   WBC 11.92 9.83 9.92   HGB 7.8* 6.6* 7.8*   HCT 25.3* 21.5* 25.6*   * 473* 471*       All  pertinent labs results from the past 24 hours have been reviewed.    Significant Imaging:  All pertinent imaging results/findings from the past 24 hours have been reviewed.                    Assessment and Plan:     Pneumaturia  Lorraine Robb is a 60 y.o. F with history of diverticulitis with pneumaturia    -- No indication for acute urologic intervention  -- She is emptying her bladder with a low PVR  -- Antibiotics per primary  -- If colorectal surgery planning for future operative intervention we can be available if needed  -- Remainder of care per primary        VTE Risk Mitigation (From admission, onward)           Ordered     enoxaparin injection 40 mg  Daily         03/13/25 2038     Place sequential compression device  Until discontinued         03/13/25 2038                    Thank you for your consult. I will sign off. Please contact us if you have any additional questions.    GIOVANNI MARSHALL MD  Urology  [facility] - Telemetry Stepdown    Patient was seen at the bedside, chart reviewed.  Urology service will be happy to be available to place a ureteral catheter as directed by Dr. Stone.

## 2025-03-14 NOTE — SUBJECTIVE & OBJECTIVE
Past Medical History:   Diagnosis Date    Diverticulitis     Hypertension        No past surgical history on file.    Review of patient's allergies indicates:   Allergen Reactions    Flagyl [metronidazole hcl] Itching and Dermatitis       Family History       Problem Relation (Age of Onset)    Cancer Sister    Diabetes Mother, Sister    Hypertension Mother            Tobacco Use    Smoking status: Former     Current packs/day: 0.00     Types: Cigarettes     Quit date: 2017     Years since quittin.1    Smokeless tobacco: Never   Substance and Sexual Activity    Alcohol use: No    Drug use: Not Currently    Sexual activity: Not on file       Review of Systems   Constitutional:  Negative for appetite change, chills and fever.   HENT:  Negative for congestion and sore throat.    Respiratory:  Negative for cough and wheezing.    Gastrointestinal:  Negative for abdominal pain, nausea and vomiting.   Genitourinary:  Positive for frequency. Negative for flank pain and hematuria.   Neurological:  Negative for headaches.   Psychiatric/Behavioral:  Negative for agitation.        Objective:     Temp:  [98 °F (36.7 °C)-98.9 °F (37.2 °C)] 98.8 °F (37.1 °C)  Pulse:  [81-93] 82  Resp:  [16-24] 16  SpO2:  [95 %-100 %] 98 %  BP: (100-132)/(51-72) 120/72  Weight: 76 kg (167 lb 8.8 oz)  Body mass index is 27.88 kg/m².           Drains       None                    Physical Exam  Constitutional:       General: She is not in acute distress.  HENT:      Head: Normocephalic.   Cardiovascular:      Rate and Rhythm: Normal rate.   Pulmonary:      Effort: Pulmonary effort is normal.   Abdominal:      General: There is no distension.      Palpations: Abdomen is soft.      Tenderness: There is no abdominal tenderness. There is no right CVA tenderness or left CVA tenderness.   Genitourinary:     Comments: Post void bladder scan 16cc  Musculoskeletal:         General: Normal range of motion.   Skin:     General: Skin is warm and dry.    Neurological:      Mental Status: She is alert.      Coordination: Coordination normal.   Psychiatric:         Behavior: Behavior normal.          Significant Labs:    BMP:  Recent Labs   Lab 03/12/25  1414 03/13/25  0602 03/14/25  0646    138 141   K 3.6 3.2* 3.5    107 111*   CO2 23 22* 19*   BUN 11 14 10   CREATININE 1.1 1.1 0.8   CALCIUM 9.8 9.0 8.5*       CBC:  Recent Labs   Lab 03/12/25  1414 03/13/25  0602 03/14/25  0646   WBC 11.92 9.83 9.92   HGB 7.8* 6.6* 7.8*   HCT 25.3* 21.5* 25.6*   * 473* 471*       All pertinent labs results from the past 24 hours have been reviewed.    Significant Imaging:  All pertinent imaging results/findings from the past 24 hours have been reviewed.

## 2025-03-14 NOTE — SUBJECTIVE & OBJECTIVE
PTA Medications   Medication Sig    ferrous sulfate (FEOSOL) Tab tablet Take 1 tablet (1 each total) by mouth As instructed (Take one tab by mouth with meal once daily for 7 days then increase to 1 tab twice daily with meals).    lisinopril 10 MG tablet Take 10 mg by mouth once daily.    senna-docusate 8.6-50 mg (SENOKOT-S) 8.6-50 mg per tablet Take 1 tablet by mouth 2 (two) times daily as needed for Constipation. (Patient not taking: No sig reported)       Review of patient's allergies indicates:   Allergen Reactions    Flagyl [metronidazole hcl] Itching and Dermatitis       Past Medical History:   Diagnosis Date    Diverticulitis     Hypertension      Past Surgical History:  R rotator cuff repair    Family History       Problem Relation (Age of Onset)    Cancer Sister    Diabetes Mother, Sister    Hypertension Mother          Tobacco Use    Smoking status: Former     Current packs/day: 0.00     Types: Cigarettes     Quit date: 2017     Years since quittin.1    Smokeless tobacco: Never   Substance and Sexual Activity    Alcohol use: No    Drug use: Not Currently    Sexual activity: Not on file     Review of Systems   Genitourinary:         Pneumaturia and sediment in urine   All other systems reviewed and are negative.    Objective:     Vital Signs (Most Recent):  Temp: 98.8 °F (37.1 °C) (25 0734)  Pulse: 82 (25 0734)  Resp: 16 (25 0846)  BP: 120/72 (25 0734)  SpO2: 98 % (25 0734) Vital Signs (24h Range):  Temp:  [98 °F (36.7 °C)-98.9 °F (37.2 °C)] 98.8 °F (37.1 °C)  Pulse:  [81-93] 82  Resp:  [16-24] 16  SpO2:  [95 %-100 %] 98 %  BP: (100-132)/(51-72) 120/72     Weight: 76 kg (167 lb 8.8 oz)  Body mass index is 27.88 kg/m².     Physical Exam  Constitutional:       General: She is not in acute distress.     Appearance: Normal appearance. She is not ill-appearing or toxic-appearing.   HENT:      Head: Normocephalic.      Mouth/Throat:      Mouth: Mucous membranes are moist.       Pharynx: Oropharynx is clear.   Eyes:      Extraocular Movements: Extraocular movements intact.      Conjunctiva/sclera: Conjunctivae normal.   Cardiovascular:      Rate and Rhythm: Normal rate and regular rhythm.   Pulmonary:      Effort: Pulmonary effort is normal. No respiratory distress.   Abdominal:      General: There is no distension.      Palpations: Abdomen is soft.      Tenderness: There is no abdominal tenderness. There is no guarding.   Musculoskeletal:         General: No swelling.   Skin:     General: Skin is warm and dry.   Neurological:      General: No focal deficit present.      Mental Status: She is alert.   Psychiatric:         Mood and Affect: Mood normal.         Behavior: Behavior normal.         Thought Content: Thought content normal.         Judgment: Judgment normal.        Significant Labs:  BMP (Last 3 Results):   Recent Labs   Lab 03/12/25  1414 03/13/25  0602 03/14/25  0646   GLU 90 88 110    138 141   K 3.6 3.2* 3.5    107 111*   CO2 23 22* 19*   BUN 11 14 10   CREATININE 1.1 1.1 0.8   CALCIUM 9.8 9.0 8.5*   MG  --  2.0 1.9     CBC (Last 3 Results):   Recent Labs   Lab 03/12/25  1414 03/13/25  0602 03/14/25  0646   WBC 11.92 9.83 9.92   RBC 2.77* 2.34* 2.78*   HGB 7.8* 6.6* 7.8*   HCT 25.3* 21.5* 25.6*   * 473* 471*   MCV 91 92 92   MCH 28.2 28.2 28.1   MCHC 30.8* 30.7* 30.5*       Significant Diagnostics:  CT reviewed 3/12    The bladder is collapsed containing a moderate amount of air.  There is bladder wall thickening with haziness of the surrounding fat.  In addition there appears to be a defect within the posterior wall of the bladder on sagittal image 78 of series 602 which communicates with a curvilinear debris filled collection that is located both posterior to the bladder and appears to surround the uterus.  It appears distinctly separate from the adjacent bowel but may communicate with the sigmoid colon.  This collection was present on the previous study.   On today's study the collection measures approximately 8.0 x 4.1 cm.  There is colonic diverticulosis.  As described above there is a curvilinear collection that partially surrounds the uterus and appears to demonstrate a fistula to the abnormal appearing bladder.  Further clinical evaluation is advised.    Findings suggestive of a bladder fistula communicating with a curvilinear debris filled collection within the pelvis that may communicate with the sigmoid colon.  In addition the bladder wall appears thickened and there is stranding of the surrounding fat which could indicate superimposed chronic or acute cystitis.  Findings must be correlated with clinical circumstances.   New indeterminate right hepatic lobe lesion raising concern for a malignant process based upon CT findings.  Recommend follow-up MRI of the liver without and with intravenous contrast.

## 2025-03-14 NOTE — ASSESSMENT & PLAN NOTE
Anemia is likely due to VIVIANA. Most recent hemoglobin and hematocrit are listed below.  Recent Labs     03/12/25  1414 03/13/25  0602 03/14/25  0646   HGB 7.8* 6.6* 7.8*   HCT 25.3* 21.5* 25.6*     Plan  - Monitor serial CBC: Daily  - Transfuse PRBC if patient becomes hemodynamically unstable, symptomatic or H/H drops below 7/21.  - Patient has received 1 units of PRBCs on 3/13  at [facility]    - no evidence of GI/ bleeding or other acute blood loss  - Patient's anemia is currently stable  - continue home iron supplement

## 2025-03-14 NOTE — ASSESSMENT & PLAN NOTE
"- patient is identified as having Diastolic (HFpEF) heart failure that is Chronic  - patient is off CHF pathway.   - last BNP reviewed- and noted below No results for input(s): "BNP", "BNPTRIAGEBLO" in the last 168 hours..  - CXR with pending  - last echo performed and demonstrates- Results for orders placed during the hospital encounter of 10/05/21    Echo    Interpretation Summary  · The left ventricle is normal in size with concentric remodeling and normal systolic function. The estimated ejection fraction is 55%.  · Normal right ventricular size with normal right ventricular systolic function.  · Grade II left ventricular diastolic dysfunction.  · Moderate left atrial enlargement.  · The estimated PA systolic pressure is 34 mmHg.  · Normal central venous pressure (3 mmHg).  · Small posterolateral pericardial effusion.    - monitor on telemetry.    Intake/Output Summary (Last 24 hours) at 3/14/2025 0846  Last data filed at 3/13/2025 2134  Gross per 24 hour   Intake 240 ml   Output 1000 ml   Net -760 ml     - cardiac diet with salt restriction.   - supplemental O2 as needed to keep Spo2 >90%  - continue to stress to patient importance of self efficacy and  on diet for CHF.  "

## 2025-03-14 NOTE — PLAN OF CARE
03/14/25 1256   Readmission   Was this a planned readmission?   (no readmit - transfer from [facility])

## 2025-03-14 NOTE — ASSESSMENT & PLAN NOTE
Patient's most recent phosphorus results are listed below.   Recent Labs     03/13/25  0602 03/14/25  0646   PHOS 3.9 2.6*     Plan  - Will treat hypophosphatemia with replacement  - Patient's hypophosphatemia is stable

## 2025-03-14 NOTE — PLAN OF CARE
Transfer from [facility]    CM to bedside - pt provided assessment info. Pt is independent w/ no DME in place, lives w/ spouse. Pt will likely d/c home w/ no needs.     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]    Admission Date: 3/13/2025  Expected Discharge Date: 3/18/2025    Transition of Care Barriers: None    Payor: [facility] / Plan: [employer] [facility] MARKETPLACE / Product Type: Commercial /     Extended Emergency Contact Information  Primary Emergency Contact: Hany Robb   [facility]  Home Phone: [phone]  Relation: Spouse  Secondary Emergency Contact: Holly Pires   [place]  Mobile Phone: [phone]  Relation: Daughter    Discharge Plan A: Home with family  Discharge Plan B: Home with family, Home Health      [id number] [place]  [address] [address]  [address] [address]  [facility] [address]  Phone: [phone] Fax: [phone]    [id number] - [place], [place] - [address] [facility] [address]  [address]  [place] [address]  Phone: [phone] Fax: [phone]      Initial Assessment (most recent)       Adult Discharge Assessment - 03/14/25 1251          Discharge Assessment    Assessment Type Discharge Planning Assessment     Confirmed/corrected address, phone number and insurance Yes     Confirmed Demographics Correct on Facesheet     Source of Information patient;family;health record     Does patient/caregiver understand observation status Yes     Communicated MICHAEL with patient/caregiver Yes     Reason For Admission bladder fistula     People in Home spouse     Facility Arrived From: transfer from [facility]     Do you expect to return to your current living situation?  Yes     Do you have help at home or someone to help you manage your care at home? Yes     Who are your caregiver(s) and their phone number(s)? spouse Hany [phone]     Prior to hospitilization cognitive status: Alert/Oriented     Current cognitive status: Alert/Oriented     Walking or Climbing Stairs Difficulty no     Dressing/Bathing Difficulty no     Equipment Currently Used at Home 3-in-1 commode     Readmission within 30 days? No     Patient currently being followed by outpatient case management? Unable to determine (comments)     Do you currently have service(s) that help you manage your care at home? No     Do you take prescription medications? Yes     Who is going to help you get home at discharge? spouse     How do you get to doctors appointments? car, drives self;family or friend will provide     Are you on dialysis? No     Do you take coumadin? No     Discharge Plan A Home with family     Discharge Plan B Home with family;Home Health     DME Needed Upon Discharge  none     Discharge Plan discussed with: Patient     Transition of Care Barriers None        Physical Activity    On average, how many days per week do you engage in moderate to strenuous exercise (like a brisk walk)? 7 days     On average, how many minutes do you engage in exercise at this level? 20 min        Financial Resource Strain    How hard is it for you to pay for the very basics like food, housing, medical care, and heating? Not hard at all        Housing Stability    In the last 12 months, was there a time when you were not able to pay the mortgage or rent on time? No     At any time in the past 12 months, were you homeless or living in a shelter (including now)? No        Transportation Needs    Has the lack of transportation kept you from medical appointments, meetings, work or from getting things needed for daily living? No        Food Insecurity    Within the past 12 months, you worried that your food would run out before you  got the money to buy more. Never true     Within the past 12 months, the food you bought just didn't last and you didn't have money to get more. Never true        Stress    Do you feel stress - tense, restless, nervous, or anxious, or unable to sleep at night because your mind is troubled all the time - these days? Only a little        Social Isolation    How often do you feel lonely or isolated from those around you?  Rarely        Alcohol Use    Q1: How often do you have a drink containing alcohol? Never     Q2: How many drinks containing alcohol do you have on a typical day when you are drinking? Patient does not drink     Q3: How often do you have six or more drinks on one occasion? Never        Utilities    In the past 12 months has the electric, gas, oil, or water company threatened to shut off services in your home? No        Health Literacy    How often do you need to have someone help you when you read instructions, pamphlets, or other written material from your doctor or pharmacy? Rarely

## 2025-03-14 NOTE — ASSESSMENT & PLAN NOTE
Lorraine Robb is a 60 y.o. F with history of diverticulitis with pneumaturia    -- No indication for acute urologic intervention  -- She is emptying her bladder with a low PVR  -- Antibiotics per primary  -- If colorectal surgery planning for future operative intervention we can be available if needed  -- Remainder of care per primary

## 2025-03-14 NOTE — ASSESSMENT & PLAN NOTE
Patient's blood pressure range in the last 24 hours was: BP  Min: 100/51  Max: 132/68.The patient's inpatient anti-hypertensive regimen is listed below:  Current Antihypertensives       Plan  - BP is controlled, no changes needed to their regimen  - patient reports not on home antihypertensives

## 2025-03-14 NOTE — SUBJECTIVE & OBJECTIVE
Past Medical History:   Diagnosis Date    Diverticulitis     Hypertension        No past surgical history on file.    Review of patient's allergies indicates:   Allergen Reactions    Flagyl [metronidazole hcl] Itching and Dermatitis       Current Facility-Administered Medications on File Prior to Encounter   Medication    [COMPLETED] gadobutroL (GADAVIST) injection 7.4 mL    [COMPLETED] potassium chloride SA CR tablet 40 mEq    [DISCONTINUED] 0.9%  NaCl infusion (for blood administration)    [DISCONTINUED] 0.9% NaCl infusion    [DISCONTINUED] acetaminophen tablet 650 mg    [DISCONTINUED] dextrose 50% injection 12.5 g    [DISCONTINUED] dextrose 50% injection 25 g    [DISCONTINUED] enoxaparin injection 40 mg    [DISCONTINUED] glucagon (human recombinant) injection 1 mg    [DISCONTINUED] glucose chewable tablet 16 g    [DISCONTINUED] glucose chewable tablet 24 g    [DISCONTINUED] HYDROcodone-acetaminophen 5-325 mg per tablet 1 tablet    [DISCONTINUED] melatonin tablet 6 mg    [DISCONTINUED] morphine injection 2 mg    [DISCONTINUED] naloxone 0.4 mg/mL injection 0.02 mg    [DISCONTINUED] ondansetron injection 4 mg    [DISCONTINUED] piperacillin-tazobactam (ZOSYN) 4.5 g in D5W 100 mL IVPB (MB+)    [DISCONTINUED] senna-docusate 8.6-50 mg per tablet 1 tablet    [DISCONTINUED] sodium chloride 0.9% flush 10 mL     Current Outpatient Medications on File Prior to Encounter   Medication Sig    ferrous sulfate (FEOSOL) Tab tablet Take 1 tablet (1 each total) by mouth As instructed (Take one tab by mouth with meal once daily for 7 days then increase to 1 tab twice daily with meals).    lisinopril 10 MG tablet Take 10 mg by mouth once daily.    senna-docusate 8.6-50 mg (SENOKOT-S) 8.6-50 mg per tablet Take 1 tablet by mouth 2 (two) times daily as needed for Constipation. (Patient not taking: No sig reported)     Family History       Problem Relation (Age of Onset)    Cancer Sister    Diabetes Mother, Sister    Hypertension Mother           Tobacco Use    Smoking status: Former     Current packs/day: 0.00     Types: Cigarettes     Quit date: 2017     Years since quittin.1    Smokeless tobacco: Never   Substance and Sexual Activity    Alcohol use: No    Drug use: Not Currently    Sexual activity: Not on file     Review of Systems   Constitutional:  Negative for activity change, chills and fever.   HENT:  Negative for trouble swallowing.    Eyes:  Negative for photophobia and visual disturbance.   Respiratory:  Negative for cough, chest tightness and shortness of breath.    Cardiovascular:  Negative for chest pain, palpitations and leg swelling.   Gastrointestinal:  Positive for abdominal pain and diarrhea. Negative for constipation, nausea and vomiting.   Genitourinary:  Negative for dysuria, frequency and hematuria.   Musculoskeletal:  Positive for back pain. Negative for gait problem and neck pain.   Skin:  Negative for rash and wound.   Neurological:  Negative for dizziness, syncope, speech difficulty and light-headedness.   Psychiatric/Behavioral:  Negative for agitation and confusion. The patient is not nervous/anxious.      Objective:     Vital Signs (Most Recent):  Temp: 98.8 °F (37.1 °C) (25 0734)  Pulse: 82 (25 0734)  Resp: 19 (25 0622)  BP: 120/72 (25 07)  SpO2: 98 % (25 07) Vital Signs (24h Range):  Temp:  [98 °F (36.7 °C)-98.9 °F (37.2 °C)] 98.8 °F (37.1 °C)  Pulse:  [81-93] 82  Resp:  [18-24] 19  SpO2:  [95 %-100 %] 98 %  BP: (100-132)/(51-72) 120/72     Weight: 76 kg (167 lb 8.8 oz)  Body mass index is 27.88 kg/m².     Physical Exam  Vitals and nursing note reviewed.   Constitutional:       General: She is not in acute distress.     Appearance: She is well-developed.   HENT:      Head: Normocephalic and atraumatic.      Mouth/Throat:      Pharynx: No oropharyngeal exudate.   Eyes:      Extraocular Movements: Extraocular movements intact.      Conjunctiva/sclera: Conjunctivae normal.    Cardiovascular:      Rate and Rhythm: Normal rate and regular rhythm.      Heart sounds: Normal heart sounds.   Pulmonary:      Effort: Pulmonary effort is normal. No respiratory distress.      Breath sounds: Normal breath sounds. No wheezing.   Abdominal:      General: Bowel sounds are normal. There is no distension.      Palpations: Abdomen is soft.      Tenderness: There is abdominal tenderness (mild, generalized).   Musculoskeletal:         General: No tenderness. Normal range of motion.      Cervical back: Normal range of motion and neck supple.   Lymphadenopathy:      Cervical: No cervical adenopathy.   Skin:     General: Skin is warm and dry.      Capillary Refill: Capillary refill takes less than 2 seconds.      Findings: No rash.   Neurological:      Mental Status: She is alert and oriented to person, place, and time.      Cranial Nerves: No cranial nerve deficit.      Sensory: No sensory deficit.      Coordination: Coordination normal.   Psychiatric:         Behavior: Behavior normal.         Thought Content: Thought content normal.         Judgment: Judgment normal.                Significant Labs: All pertinent labs within the past 24 hours have been reviewed.  Bilirubin:   Recent Labs   Lab 03/12/25  1414 03/13/25  0602 03/14/25  0646   BILITOT 0.6 0.5 0.4     Blood Culture:   Recent Labs   Lab 03/12/25  1925 03/13/25  0058   LABBLOO No Growth to date  No Growth to date No Growth to date  No Growth to date     BMP:   Recent Labs   Lab 03/14/25  0646         K 3.5   *   CO2 19*   BUN 10   CREATININE 0.8   CALCIUM 8.5*   MG 1.9     CBC:   Recent Labs   Lab 03/12/25  1414 03/13/25  0602 03/14/25  0646   WBC 11.92 9.83 9.92   HGB 7.8* 6.6* 7.8*   HCT 25.3* 21.5* 25.6*   * 473* 471*     CMP:   Recent Labs   Lab 03/12/25  1414 03/13/25  0602 03/14/25  0646    138 141   K 3.6 3.2* 3.5    107 111*   CO2 23 22* 19*   GLU 90 88 110   BUN 11 14 10   CREATININE 1.1 1.1 0.8    CALCIUM 9.8 9.0 8.5*   PROT 8.6* 7.4 7.4   ALBUMIN 2.6* 2.3* 2.3*   BILITOT 0.6 0.5 0.4   ALKPHOS 76 64 63   AST 10 9* 17   ALT 9* 9* 8*   ANIONGAP 10 9 11     Lipase:   Recent Labs   Lab 03/12/25  1414   LIPASE 10     Magnesium:   Recent Labs   Lab 03/13/25  0602 03/14/25  0646   MG 2.0 1.9       POCT Glucose:   Recent Labs   Lab 03/13/25  2131   POCTGLUCOSE 88     Urine Studies:   Recent Labs   Lab 03/13/25  0134   COLORU Yellow   APPEARANCEUA Clear   PHUR 6.0   SPECGRAV 1.015   PROTEINUA 2+*   GLUCUA Negative   KETONESU Negative   BILIRUBINUA Negative   OCCULTUA Negative   NITRITE Negative   UROBILINOGEN Negative   LEUKOCYTESUR Negative   RBCUA 1   WBCUA 1   BACTERIA Occasional   SQUAMEPITHEL 3   HYALINECASTS 0       Significant Imaging: I have reviewed all pertinent imaging results/findings within the past 24 hours.

## 2025-03-14 NOTE — NURSING
Report received from BHAVIN Patrick. Patient remains free from falls and injury. NADN. VSS. Questions encouraged and answered. Patient verbalized understanding. Bed locked and in low position; safety maintained. Call light in reach. White board updated, and explained. No complaint of n/v, diarrhea, or SOB. Pain medication requested. Will continue to monitor, will continue with plan of care.

## 2025-03-14 NOTE — H&P
[facility]  History & Physical    Patient Name: Lorraine Robb  MRN: [id number]  Patient Class: IP- Inpatient  Admission Date: 3/13/2025  Attending Physician: Tank Plaza DO   Primary Care Provider: Pardeep Kay MD         Patient information was obtained from patient, past medical records, and ER records.     Subjective:     Principal Problem:Bladder fistula    Chief Complaint: No chief complaint on file.       HPI: Ms. Robb is a 59 yo female with hypertension, chronic diastolic heart failure, diverticulitis, and previous bladder fistula that presented to the [facility] with abdominal pain, diarrhea, urinary retention and dysuria. She reports that she has had general malaise that has been worsening over the last week with mild abdominal pain, multiple episodes of loose stool, difficulty urinating and painful urination. When her symptoms were not improving, patient presented to the ER for further evaluation. She was noted to have mild tenderness to palpation on exam. CT of the abdomen showed findings suggestive of bladder fistula communicating with in the pelvis with the sigmoid colon and a new indeterminate right hepatic lobe lesion raising concern for malignant process with MRI follow-up recommended. ER provider reached out to colorectal surgery at [facility] (Dr. Abhijit Wharton) for transfer earlier in the night but no beds were available then. He recommended to initiate IV antibiotics, and zosyn was started. She was admitted to [facility] while awaiting a bed at [facility]. Transfer to [facility] is requested for evaluation and management of bladder fistula and liver mass.     Past Medical History:   Diagnosis Date    Diverticulitis     Hypertension        No past surgical history on file.    Review of patient's allergies indicates:   Allergen Reactions    Flagyl [metronidazole hcl] Itching and Dermatitis       Current Facility-Administered Medications on File Prior  to Encounter   Medication    [COMPLETED] gadobutroL (GADAVIST) injection 7.4 mL    [COMPLETED] potassium chloride SA CR tablet 40 mEq    [DISCONTINUED] 0.9%  NaCl infusion (for blood administration)    [DISCONTINUED] 0.9% NaCl infusion    [DISCONTINUED] acetaminophen tablet 650 mg    [DISCONTINUED] dextrose 50% injection 12.5 g    [DISCONTINUED] dextrose 50% injection 25 g    [DISCONTINUED] enoxaparin injection 40 mg    [DISCONTINUED] glucagon (human recombinant) injection 1 mg    [DISCONTINUED] glucose chewable tablet 16 g    [DISCONTINUED] glucose chewable tablet 24 g    [DISCONTINUED] HYDROcodone-acetaminophen 5-325 mg per tablet 1 tablet    [DISCONTINUED] melatonin tablet 6 mg    [DISCONTINUED] morphine injection 2 mg    [DISCONTINUED] naloxone 0.4 mg/mL injection 0.02 mg    [DISCONTINUED] ondansetron injection 4 mg    [DISCONTINUED] piperacillin-tazobactam (ZOSYN) 4.5 g in D5W 100 mL IVPB (MB+)    [DISCONTINUED] senna-docusate 8.6-50 mg per tablet 1 tablet    [DISCONTINUED] sodium chloride 0.9% flush 10 mL     Current Outpatient Medications on File Prior to Encounter   Medication Sig    ferrous sulfate (FEOSOL) Tab tablet Take 1 tablet (1 each total) by mouth As instructed (Take one tab by mouth with meal once daily for 7 days then increase to 1 tab twice daily with meals).    lisinopril 10 MG tablet Take 10 mg by mouth once daily.    senna-docusate 8.6-50 mg (SENOKOT-S) 8.6-50 mg per tablet Take 1 tablet by mouth 2 (two) times daily as needed for Constipation. (Patient not taking: No sig reported)     Family History       Problem Relation (Age of Onset)    Cancer Sister    Diabetes Mother, Sister    Hypertension Mother          Tobacco Use    Smoking status: Former     Current packs/day: 0.00     Types: Cigarettes     Quit date: 2017     Years since quittin.1    Smokeless tobacco: Never   Substance and Sexual Activity    Alcohol use: No    Drug use: Not Currently    Sexual activity: Not on file      Review of Systems   Constitutional:  Negative for activity change, chills and fever.   HENT:  Negative for trouble swallowing.    Eyes:  Negative for photophobia and visual disturbance.   Respiratory:  Negative for cough, chest tightness and shortness of breath.    Cardiovascular:  Negative for chest pain, palpitations and leg swelling.   Gastrointestinal:  Positive for abdominal pain and diarrhea. Negative for constipation, nausea and vomiting.   Genitourinary:  Negative for dysuria, frequency and hematuria.   Musculoskeletal:  Positive for back pain. Negative for gait problem and neck pain.   Skin:  Negative for rash and wound.   Neurological:  Negative for dizziness, syncope, speech difficulty and light-headedness.   Psychiatric/Behavioral:  Negative for agitation and confusion. The patient is not nervous/anxious.      Objective:     Vital Signs (Most Recent):  Temp: 98.8 °F (37.1 °C) (03/14/25 0734)  Pulse: 82 (03/14/25 0734)  Resp: 19 (03/14/25 0622)  BP: 120/72 (03/14/25 0734)  SpO2: 98 % (03/14/25 0734) Vital Signs (24h Range):  Temp:  [98 °F (36.7 °C)-98.9 °F (37.2 °C)] 98.8 °F (37.1 °C)  Pulse:  [81-93] 82  Resp:  [18-24] 19  SpO2:  [95 %-100 %] 98 %  BP: (100-132)/(51-72) 120/72     Weight: 76 kg (167 lb 8.8 oz)  Body mass index is 27.88 kg/m².     Physical Exam  Vitals and nursing note reviewed.   Constitutional:       General: She is not in acute distress.     Appearance: She is well-developed.   HENT:      Head: Normocephalic and atraumatic.      Mouth/Throat:      Pharynx: No oropharyngeal exudate.   Eyes:      Extraocular Movements: Extraocular movements intact.      Conjunctiva/sclera: Conjunctivae normal.   Cardiovascular:      Rate and Rhythm: Normal rate and regular rhythm.      Heart sounds: Normal heart sounds.   Pulmonary:      Effort: Pulmonary effort is normal. No respiratory distress.      Breath sounds: Normal breath sounds. No wheezing.   Abdominal:      General: Bowel sounds are  normal. There is no distension.      Palpations: Abdomen is soft.      Tenderness: There is abdominal tenderness (mild, generalized).   Musculoskeletal:         General: No tenderness. Normal range of motion.      Cervical back: Normal range of motion and neck supple.   Lymphadenopathy:      Cervical: No cervical adenopathy.   Skin:     General: Skin is warm and dry.      Capillary Refill: Capillary refill takes less than 2 seconds.      Findings: No rash.   Neurological:      Mental Status: She is alert and oriented to person, place, and time.      Cranial Nerves: No cranial nerve deficit.      Sensory: No sensory deficit.      Coordination: Coordination normal.   Psychiatric:         Behavior: Behavior normal.         Thought Content: Thought content normal.         Judgment: Judgment normal.                Significant Labs: All pertinent labs within the past 24 hours have been reviewed.  Bilirubin:   Recent Labs   Lab 03/12/25  1414 03/13/25  0602 03/14/25  0646   BILITOT 0.6 0.5 0.4     Blood Culture:   Recent Labs   Lab 03/12/25  1925 03/13/25  0058   LABBLOO No Growth to date  No Growth to date No Growth to date  No Growth to date     BMP:   Recent Labs   Lab 03/14/25  0646         K 3.5   *   CO2 19*   BUN 10   CREATININE 0.8   CALCIUM 8.5*   MG 1.9     CBC:   Recent Labs   Lab 03/12/25  1414 03/13/25  0602 03/14/25  0646   WBC 11.92 9.83 9.92   HGB 7.8* 6.6* 7.8*   HCT 25.3* 21.5* 25.6*   * 473* 471*     CMP:   Recent Labs   Lab 03/12/25  1414 03/13/25  0602 03/14/25  0646    138 141   K 3.6 3.2* 3.5    107 111*   CO2 23 22* 19*   GLU 90 88 110   BUN 11 14 10   CREATININE 1.1 1.1 0.8   CALCIUM 9.8 9.0 8.5*   PROT 8.6* 7.4 7.4   ALBUMIN 2.6* 2.3* 2.3*   BILITOT 0.6 0.5 0.4   ALKPHOS 76 64 63   AST 10 9* 17   ALT 9* 9* 8*   ANIONGAP 10 9 11     Lipase:   Recent Labs   Lab 03/12/25  1414   LIPASE 10     Magnesium:   Recent Labs   Lab 03/13/25  0602 03/14/25  0646   MG 2.0  "1.9       POCT Glucose:   Recent Labs   Lab 03/13/25  2131   POCTGLUCOSE 88     Urine Studies:   Recent Labs   Lab 03/13/25  0134   COLORU Yellow   APPEARANCEUA Clear   PHUR 6.0   SPECGRAV 1.015   PROTEINUA 2+*   GLUCUA Negative   KETONESU Negative   BILIRUBINUA Negative   OCCULTUA Negative   NITRITE Negative   UROBILINOGEN Negative   LEUKOCYTESUR Negative   RBCUA 1   WBCUA 1   BACTERIA Occasional   SQUAMEPITHEL 3   HYALINECASTS 0       Significant Imaging: I have reviewed all pertinent imaging results/findings within the past 24 hours.      Assessment/Plan:     * Bladder fistula  - patient with reports of abdominal pain and stool in urine for the last few days   - reports bladder fistula in past which reports spontaneously "resolved"   - CT with findings suggestive of a bladder fistula communicating with a curvilinear debris filled collection within the pelvis that may communicate with the sigmoid colon. In addition the bladder wall appears thickened and there is stranding of the surrounding fat which could indicate superimposed chronic or acute cystitis.   - patient initiated on IV zosyn, continue for now  - blood cultures pending  - stool studies collected at [place], negative for C.diff  - urology consulted, appreciate recommendations  - CRS consulted, appreciate recommendations     Hypophosphatemia  Patient's most recent phosphorus results are listed below.   Recent Labs     03/13/25  0602 03/14/25  0646   PHOS 3.9 2.6*     Plan  - Will treat hypophosphatemia with replacement  - Patient's hypophosphatemia is stable    Liver mass  - CT scan shows significant liver mass concerning for possible malignancy.    - MRI of the abdomen obtained: two hepatic lesions compatible with cysts, no suspicious hepatic lesions    Essential hypertension  Patient's blood pressure range in the last 24 hours was: BP  Min: 100/51  Max: 132/68.The patient's inpatient anti-hypertensive regimen is listed below:  Current " "Antihypertensives       Plan  - BP is controlled, no changes needed to their regimen  - patient reports not on home antihypertensives    Chronic heart failure with preserved ejection fraction  - patient is identified as having Diastolic (HFpEF) heart failure that is Chronic  - patient is off CHF pathway.   - last BNP reviewed- and noted below No results for input(s): "BNP", "BNPTRIAGEBLO" in the last 168 hours..  - CXR with pending  - last echo performed and demonstrates- Results for orders placed during the hospital encounter of 10/05/21    Echo    Interpretation Summary  · The left ventricle is normal in size with concentric remodeling and normal systolic function. The estimated ejection fraction is 55%.  · Normal right ventricular size with normal right ventricular systolic function.  · Grade II left ventricular diastolic dysfunction.  · Moderate left atrial enlargement.  · The estimated PA systolic pressure is 34 mmHg.  · Normal central venous pressure (3 mmHg).  · Small posterolateral pericardial effusion.    - monitor on telemetry.    Intake/Output Summary (Last 24 hours) at 3/14/2025 0846  Last data filed at 3/13/2025 2134  Gross per 24 hour   Intake 240 ml   Output 1000 ml   Net -760 ml     - cardiac diet with salt restriction.   - supplemental O2 as needed to keep Spo2 >90%  - continue to stress to patient importance of self efficacy and  on diet for CHF.    Normocytic anemia  Anemia is likely due to VIVIANA. Most recent hemoglobin and hematocrit are listed below.  Recent Labs     03/12/25  1414 03/13/25  0602 03/14/25  0646   HGB 7.8* 6.6* 7.8*   HCT 25.3* 21.5* 25.6*     Plan  - Monitor serial CBC: Daily  - Transfuse PRBC if patient becomes hemodynamically unstable, symptomatic or H/H drops below 7/21.  - Patient has received 1 units of PRBCs on 3/13  at [facility]    - no evidence of GI/ bleeding or other acute blood loss  - Patient's anemia is currently stable  - continue home iron " supplement      VTE Risk Mitigation (From admission, onward)           Ordered     enoxaparin injection 40 mg  Daily         03/13/25 2038     IP VTE HIGH RISK PATIENT  Once         03/13/25 2038     Place sequential compression device  Until discontinued         03/13/25 2038                                    Katherine Degroot PA-C  Department of Hospital Medicine  [facility] - Telemetry Stepdown

## 2025-03-14 NOTE — HPI
Ms. Robb is a 61 yo female with hypertension, chronic diastolic heart failure, diverticulitis, history of pelvic abscess. Urology consulted for concern for pneumaturia.    She has a history of a perforated diverticulitis in 2015 and IR pelvic drain was placed. She had another flare in 2021 that improved without surgical intervention.     On assessment, patient is afebrile, VSS. She denies abdominal pain, pelvic pain, hematuria, or incomplete bladder emptying. She does complain of urgency and frequency.   CT w/ contrast revealing a 1 cm right lower pole kidney stone. No hydronephrosis bilaterally. There is a 8 x 4 cm calcified mass that appears to be within the uterus. There is a linear tract with air at the posterior bladder wall that may represent a fistulous tract.    Her UA has occasional yeast, occasional bacteria, 1 WBC, 3 SECs, nitrite negative

## 2025-03-15 VITALS
OXYGEN SATURATION: 98 % | SYSTOLIC BLOOD PRESSURE: 150 MMHG | TEMPERATURE: 99 F | DIASTOLIC BLOOD PRESSURE: 77 MMHG | RESPIRATION RATE: 16 BRPM | BODY MASS INDEX: 28.43 KG/M2 | WEIGHT: 170.63 LBS | HEART RATE: 84 BPM | HEIGHT: 65 IN

## 2025-03-15 LAB
ALBUMIN SERPL BCP-MCNC: 2.3 G/DL (ref 3.5–5.2)
ALP SERPL-CCNC: 61 U/L (ref 40–150)
ALT SERPL W/O P-5'-P-CCNC: 7 U/L (ref 10–44)
ANION GAP SERPL CALC-SCNC: 9 MMOL/L (ref 8–16)
AST SERPL-CCNC: 11 U/L (ref 10–40)
BASOPHILS # BLD AUTO: 0.07 K/UL (ref 0–0.2)
BASOPHILS NFR BLD: 0.7 % (ref 0–1.9)
BILIRUB SERPL-MCNC: 0.3 MG/DL (ref 0.1–1)
BUN SERPL-MCNC: 9 MG/DL (ref 8–23)
CALCIUM SERPL-MCNC: 8.7 MG/DL (ref 8.7–10.5)
CHLORIDE SERPL-SCNC: 109 MMOL/L (ref 95–110)
CO2 SERPL-SCNC: 22 MMOL/L (ref 23–29)
CREAT SERPL-MCNC: 0.9 MG/DL (ref 0.5–1.4)
DIFFERENTIAL METHOD BLD: ABNORMAL
EOSINOPHIL # BLD AUTO: 0.3 K/UL (ref 0–0.5)
EOSINOPHIL NFR BLD: 2.4 % (ref 0–8)
ERYTHROCYTE [DISTWIDTH] IN BLOOD BY AUTOMATED COUNT: 15.9 % (ref 11.5–14.5)
EST. GFR  (NO RACE VARIABLE): >60 ML/MIN/1.73 M^2
GLUCOSE SERPL-MCNC: 86 MG/DL (ref 70–110)
HCT VFR BLD AUTO: 26.1 % (ref 37–48.5)
HGB BLD-MCNC: 8.2 G/DL (ref 12–16)
IMM GRANULOCYTES # BLD AUTO: 0.09 K/UL (ref 0–0.04)
IMM GRANULOCYTES NFR BLD AUTO: 0.8 % (ref 0–0.5)
LYMPHOCYTES # BLD AUTO: 2.3 K/UL (ref 1–4.8)
LYMPHOCYTES NFR BLD: 21.3 % (ref 18–48)
MAGNESIUM SERPL-MCNC: 1.8 MG/DL (ref 1.6–2.6)
MCH RBC QN AUTO: 28.7 PG (ref 27–31)
MCHC RBC AUTO-ENTMCNC: 31.4 G/DL (ref 32–36)
MCV RBC AUTO: 91 FL (ref 82–98)
MONOCYTES # BLD AUTO: 0.7 K/UL (ref 0.3–1)
MONOCYTES NFR BLD: 6.4 % (ref 4–15)
NEUTROPHILS # BLD AUTO: 7.3 K/UL (ref 1.8–7.7)
NEUTROPHILS NFR BLD: 68.4 % (ref 38–73)
NRBC BLD-RTO: 0 /100 WBC
PHOSPHATE SERPL-MCNC: 3.5 MG/DL (ref 2.7–4.5)
PLATELET # BLD AUTO: 484 K/UL (ref 150–450)
PMV BLD AUTO: 9.3 FL (ref 9.2–12.9)
POTASSIUM SERPL-SCNC: 3.5 MMOL/L (ref 3.5–5.1)
PROT SERPL-MCNC: 7.2 G/DL (ref 6–8.4)
RBC # BLD AUTO: 2.86 M/UL (ref 4–5.4)
SODIUM SERPL-SCNC: 140 MMOL/L (ref 136–145)
WBC # BLD AUTO: 10.64 K/UL (ref 3.9–12.7)

## 2025-03-15 PROCEDURE — 36415 COLL VENOUS BLD VENIPUNCTURE: CPT | Performed by: INTERNAL MEDICINE

## 2025-03-15 PROCEDURE — 25000003 PHARM REV CODE 250

## 2025-03-15 PROCEDURE — 83735 ASSAY OF MAGNESIUM: CPT | Performed by: INTERNAL MEDICINE

## 2025-03-15 PROCEDURE — 25000003 PHARM REV CODE 250: Performed by: STUDENT IN AN ORGANIZED HEALTH CARE EDUCATION/TRAINING PROGRAM

## 2025-03-15 PROCEDURE — 63600175 PHARM REV CODE 636 W HCPCS: Performed by: INTERNAL MEDICINE

## 2025-03-15 PROCEDURE — 25000003 PHARM REV CODE 250: Performed by: COMMUNITY HEALTH WORKER

## 2025-03-15 PROCEDURE — 80053 COMPREHEN METABOLIC PANEL: CPT | Performed by: INTERNAL MEDICINE

## 2025-03-15 PROCEDURE — G0378 HOSPITAL OBSERVATION PER HR: HCPCS

## 2025-03-15 PROCEDURE — 84100 ASSAY OF PHOSPHORUS: CPT | Performed by: INTERNAL MEDICINE

## 2025-03-15 PROCEDURE — 85025 COMPLETE CBC W/AUTO DIFF WBC: CPT | Performed by: INTERNAL MEDICINE

## 2025-03-15 PROCEDURE — 25000003 PHARM REV CODE 250: Performed by: INTERNAL MEDICINE

## 2025-03-15 RX ORDER — NITROFURANTOIN 25; 75 MG/1; MG/1
100 CAPSULE ORAL 2 TIMES DAILY
Qty: 80 CAPSULE | Refills: 0 | Status: SHIPPED | OUTPATIENT
Start: 2025-03-15 | End: 2025-04-24

## 2025-03-15 RX ORDER — PHENAZOPYRIDINE HYDROCHLORIDE 200 MG/1
200 TABLET, FILM COATED ORAL 3 TIMES DAILY PRN
Qty: 30 TABLET | Refills: 0 | Status: SHIPPED | OUTPATIENT
Start: 2025-03-15 | End: 2025-03-25

## 2025-03-15 RX ORDER — OXYCODONE HYDROCHLORIDE 5 MG/1
5 TABLET ORAL EVERY 12 HOURS PRN
Qty: 10 TABLET | Refills: 0 | Status: SHIPPED | OUTPATIENT
Start: 2025-03-15

## 2025-03-15 RX ADMIN — PIPERACILLIN SODIUM AND TAZOBACTAM SODIUM 4.5 G: 4; .5 INJECTION, POWDER, FOR SOLUTION INTRAVENOUS at 01:03

## 2025-03-15 RX ADMIN — OXYCODONE 5 MG: 5 TABLET ORAL at 10:03

## 2025-03-15 RX ADMIN — FERROUS SULFATE TAB EC 325 MG (65 MG FE EQUIVALENT) 1 EACH: 325 (65 FE) TABLET DELAYED RESPONSE at 10:03

## 2025-03-15 RX ADMIN — OXYCODONE 5 MG: 5 TABLET ORAL at 03:03

## 2025-03-15 RX ADMIN — THERA TABS 1 TABLET: TAB at 10:03

## 2025-03-15 RX ADMIN — ACETAMINOPHEN 1000 MG: 500 TABLET ORAL at 06:03

## 2025-03-15 RX ADMIN — PIPERACILLIN SODIUM AND TAZOBACTAM SODIUM 4.5 G: 4; .5 INJECTION, POWDER, FOR SOLUTION INTRAVENOUS at 10:03

## 2025-03-15 NOTE — PLAN OF CARE
Problem: Adult Inpatient Plan of Care  Goal: Plan of Care Review  Outcome: Progressing  Goal: Patient-Specific Goal (Individualized)  Outcome: Progressing  Goal: Absence of Hospital-Acquired Illness or Injury  Outcome: Progressing  Goal: Optimal Comfort and Wellbeing  Outcome: Progressing  Goal: Readiness for Transition of Care  Outcome: Progressing     Problem: Sepsis/Septic Shock  Goal: Optimal Coping  Outcome: Progressing  Goal: Absence of Bleeding  Outcome: Progressing  Goal: Blood Glucose Level Within Targeted Range  Outcome: Progressing  Goal: Absence of Infection Signs and Symptoms  Outcome: Progressing  Goal: Optimal Nutrition Intake  Outcome: Progressing     Problem: Acute Kidney Injury/Impairment  Goal: Fluid and Electrolyte Balance  Outcome: Progressing  Goal: Improved Oral Intake  Outcome: Progressing  Goal: Effective Renal Function  Outcome: Progressing     Problem: Infection  Goal: Absence of Infection Signs and Symptoms  Outcome: Progressing   Patient in bed. No complaints voiced. RAKESH at this time. Safety measures in place. Call light in reach.

## 2025-03-15 NOTE — DISCHARGE SUMMARY
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 She continues to be active. Her weight is stable. Nondiabetic. Nonsmoker. Anemia from chronic inflammation. Discussed segmental resection as well as hysterectomy.  Discussed that she would likely need fecal diversion due to the longstanding pelvic sepsis causing increased risk of anastomotic leak.  Ideally, we could perform primary anastomosis with proximal diversion.  We have tentatively set her up for April 21st.      On day of DC:  Constitutional:       General: She is not in acute distress.     Appearance: Normal appearance. She is not ill-appearing or toxic-appearing.   HENT:      Head: Normocephalic.      Mouth/Throat:      Mouth: Mucous membranes are moist.      Pharynx: Oropharynx is clear.   Eyes:      Extraocular Movements: Extraocular movements intact.      Conjunctiva/sclera: Conjunctivae normal.   Cardiovascular:      Rate and Rhythm: Normal rate and regular rhythm.   Pulmonary:      Effort: Pulmonary effort is normal. No respiratory distress.   Abdominal:      General: There is no distension.      Palpations: Abdomen is soft.      Tenderness: There is no abdominal tenderness. There is no guarding.   Musculoskeletal:         General: No swelling.   Skin:     General: Skin is warm and dry.   Neurological:      General: No focal deficit present.      Mental Status: She is alert.   Psychiatric:         Mood and Affect: Mood normal.         Behavior: Behavior normal.         Thought Content: Thought content normal.         Judgment: Judgment normal.     OUTCOME: Condition has improved and patient is now ready for discharge.    DISPOSITION: Home or Self Care    FINAL DIAGNOSIS:  Bladder fistula    FOLLOWUP: In clinic    DISCHARGE INSTRUCTIONS:       Clinical Reference Documents  Added to Patient Instructions         Document    DIVERTICULITIS (ENGLISH)          Discharge Instructions - Caring for your diverticulitis with bladder fistula     Pain Control: It is expected to have some discomfort, but this should be controlled with medications and will ultimately be fixed in surgery. You may be prescribed a narcotic pain medication and/or a muscle relaxer on discharge. You can take this medication as prescribed if the pain is severe but try to decrease the frequency at which you use the narcotic over the initial two (2) weeks.  You may find you need less narcotic pain medication if you combine or stagger it with Tylenol® (acetaminophen) and/or Advil® (ibuprofen). For example, you may take Tylenol 1000mg, then take Advil 600mg 3 hours later, then repeat Tylenol 3 hours after the Advil, and so on.  You should not take more than 4000 mg of Tylenol® or 3200 mg of Advil® in a 24-hour period.  Narcotics and muscle relaxers can cause drowsiness, so sometimes it is helpful to take before bed for a more comfortable rest, but you CANNOT drive, operate machinery, or do mentally important work while on this medication.  Narcotics cause painful constipation - see below on how to prevent this or what to take if this happens.     Medications:  Okay to restart your other home meds.  Add daily iron tab, daily multivitamin, proteins shakes daily to support your nutritional status for surgery.  Take pyridium to help numb any bladder discomfort you experience. This will turn your urine orange - this is normal.  Take antibiotics (prescribed macrobid) until you prepare for surgery to prevent repeat infections.     Bowel Function: Diarrhea and loose stool are normal and expected after intestinal inflammation.     Diet: Some tips to help with nutrition:  Eat several (4-6) small meals each day.  If you are having loose stools, your diet should include foods to add bulk to the stool such as applesauce, bananas, cheese,  peanut butter, pasta, and potatoes.  Follow a Low Fiber Diet. Avoid particular foods including the following:  Raw vegetables, beans, corn, mushrooms, legumes, peas, potato skins, sauerkraut, stewed tomatoes, brussels sprouts  Fresh fruit, dried fruit (such as raisins or prunes), coconut, juices with pulp. (It is okay to eat fruits such as bananas, melons, canned fruits, and avocado.)  Meat with casings (such as hot dogs, kielbasa, sausage), shellfish  Nuts, popcorn, seeds, chunky peanut butter  Coarse whole grains including breads/rolls with nuts, cereals with nuts, coarse whole grains, poppy, sesame seeds     Activity: Walking is encouraged. Light aerobic activity such as climbing stairs or leisurely bike riding is acceptable but listen to your body and let pain be your guide when reintroducing activity. If it hurts, don't do it.     Driving: You should not drive a vehicle while taking narcotic pain medications. When you return to driving, sit in your vehicle without starting the ignition and step on the brake firmly and rapidly a few times to assure you are confident with this task. Do not go alone the first time you drive.     Potential Problems:   Constipation is common when taking narcotics. You may feel full and may have gas pains. Drink plenty of non-caffeinated, non-alcoholic beverages and walk as much as possible. You can add a capful of miralax 1-2 times daily with liquids.  If this doesn't help, take senna 2 tablets 1-2 times a day, or docusate 200mg twice daily. Lastly, try milk of magnesium one dose a day.     Bowel obstruction or ileus is characterized by persistent abdominal cramps, bloating, constipation, nausea, or vomiting. If the symptoms are mild, you should restrict your dietary intake to only liquids, and avoid solid food for 2-3 days. If the symptoms are more severe or persist beyond 24 hours, please call your surgeon's office for advice.     Frequent stools and loose stools are best managed  by using bulking agents or anti-diarrheal medication. Please call your surgeon if diarrhea is not improving after a few weeks to discuss starting one of the medications below.  Benefiber®, Citrucel®, Fibercon®, Konsyl®, and Metamucil® are bulking agents that are available at most grocery stores and pharmacies. The medication should be mixed using one (1) teaspoon of the powder in the minimum amount of fluid required to dissolve the agent and taken 1-2 times each day. Benefiber® can be alternatively sprinkled over food 1-2 times each day.  Imodium® (loperamide) is also available without a prescription. It is most effective if taken before meals. You should not take more than eight (8) tablets (32 mg) of Imodium in a 24-hour period. Please call your surgeon's office if you start taking Imodium®.     Dehydration can commonly occur, and its symptoms or signs include dark urine, dizziness when standing, dry mouth, increased heart rate, leg cramps, and low volumes (less than 800 ml) of urine. If these occur, you should immediately call your surgeon's office. To avoid dehydration, you should do the following:  Drink a variety of fluids. Use an oral rehydration salt solution like Pedialyte, G2 Gatorade, Nuun tabs, etc. and sip the solution between meals.  Eat salty foods or add salt to your food.  Use an anti-diarrheal medication or bulking agent as previously instructed by your surgeon.     Follow up:  You will return to clinic to discuss and prepare for surgery with colorectal and with obgyn. Call [phone] for worsening pain, inability to urinate, or fever > 101.  Call or schedule follow up in about 7 days via [email or web address] if you are not otherwise contacted or see an appointment in the portal.        Medication List        START taking these medications      multivitamin Tab  Take 1 tablet by mouth once daily.     nitrofurantoin (macrocrystal-monohydrate) 100 MG capsule  Commonly known as: MACROBID  Take 1 capsule (100 mg  total) by mouth 2 (two) times daily.     oxyCODONE 5 MG immediate release tablet  Commonly known as: ROXICODONE  Take 1 tablet (5 mg total) by mouth every 12 (twelve) hours as needed for Pain (severe pain not controlled by tylenol or ibuprofen).     phenazopyridine 200 MG tablet  Commonly known as: PYRIDIUM  Take 1 tablet (200 mg total) by mouth 3 (three) times daily as needed for Pain (burning with urination).            CONTINUE taking these medications      FeroSuL 325 mg (65 mg iron) Tab tablet  Generic drug: ferrous sulfate  Take 1 tablet (1 each total) by mouth As instructed (Take one tab by mouth with meal once daily for 7 days then increase to 1 tab twice daily with meals).     lisinopriL 10 MG tablet            STOP taking these medications      senna-docusate 8.6-50 mg 8.6-50 mg per tablet  Commonly known as: SENOKOT-S               Where to Get Your Medications        These medications were sent to [facility]  [address] [place] [id number]      Hours: Always Open Phone: [phone]   multivitamin Tab  nitrofurantoin (macrocrystal-monohydrate) 100 MG capsule  oxyCODONE 5 MG immediate release tablet  phenazopyridine 200 MG tablet        TIME SPENT ON DISCHARGE: 12 minutes

## 2025-03-15 NOTE — PLAN OF CARE
[facility] - Telemetry Stepdown  Discharge Final Note    Primary Care Provider: Pardeep Kay MD    Expected Discharge Date: 3/15/2025    Final Discharge Note (most recent)       Final Note - 03/15/25 1210          Final Note    Assessment Type Final Discharge Note     Anticipated Discharge Disposition Home or Self Care     What phone number can be called within the next 1-3 days to see how you are doing after discharge? [phone] (P)         Post-Acute Status    Discharge Delays None known at this time (P)                    Pt to DC with no further acute needs, Pt spouse will provide transportation.     Diana PIRES,   Case Management   [phone]

## 2025-03-15 NOTE — PLAN OF CARE
Patient awake and alert. Breathes on room air.Reports burning sensation after urination. Patient reviewed by Medical team and for discharge today. IV accesses removed, Bedside delivery will take a while and patient opted to  medications on her way out. Discharge summary reviewed with patient. IV access removed. Transported off unit via wheelchair

## 2025-03-15 NOTE — DISCHARGE INSTRUCTIONS
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 You may be prescribed a narcotic pain medication and/or a muscle relaxer on discharge. You can take this medication as prescribed if the pain is severe but try to decrease the frequency at which you use the narcotic over the initial two (2) weeks.  You may find you need less narcotic pain medication if you combine or stagger it with Tylenol® (acetaminophen) and/or Advil® (ibuprofen). For example, you may take Tylenol 1000mg, then take Advil 600mg 3 hours later, then repeat Tylenol 3 hours after the Advil, and so on.  You should not take more than 4000 mg of Tylenol® or 3200 mg of Advil® in a 24-hour period.  Narcotics and muscle relaxers can cause drowsiness, so sometimes it is helpful to take before bed for a more comfortable rest, but you CANNOT drive, operate machinery, or do mentally important work while on this medication.  Narcotics cause painful constipation - see below on how to prevent this or what to take if this happens.    Medications:  Okay to restart your other home meds.  Add daily iron tab, daily multivitamin, proteins shakes daily to support your nutritional status for surgery.  Take pyridium to help numb any bladder discomfort you experience. This will turn your urine orange - this is normal.  Take antibiotics (prescribed macrobid) until you prepare for surgery to prevent repeat infections.    Bowel Function: Diarrhea and loose stool are normal and expected after intestinal inflammation.    Diet: Some tips to help with nutrition:  Eat several (4-6) small meals each day.  If you are having loose stools, your diet should include foods to add bulk to the stool such as applesauce, bananas, cheese, peanut butter, pasta, and potatoes.  Follow a Low Fiber Diet. Avoid particular foods  including the following:  Raw vegetables, beans, corn, mushrooms, legumes, peas, potato skins, sauerkraut, stewed tomatoes, brussels sprouts  Fresh fruit, dried fruit (such as raisins or prunes), coconut, juices with pulp. (It is okay to eat fruits such as bananas, melons, canned fruits, and avocado.)  Meat with casings (such as hot dogs, kielbasa, sausage), shellfish  Nuts, popcorn, seeds, chunky peanut butter  Coarse whole grains including breads/rolls with nuts, cereals with nuts, coarse whole grains, poppy, sesame seeds    Activity: Walking is encouraged. Light aerobic activity such as climbing stairs or leisurely bike riding is acceptable but listen to your body and let pain be your guide when reintroducing activity. If it hurts, don't do it.    Driving: You should not drive a vehicle while taking narcotic pain medications. When you return to driving, sit in your vehicle without starting the ignition and step on the brake firmly and rapidly a few times to assure you are confident with this task. Do not go alone the first time you drive.    Potential Problems:   Constipation is common when taking narcotics. You may feel full and may have gas pains. Drink plenty of non-caffeinated, non-alcoholic beverages and walk as much as possible. You can add a capful of miralax 1-2 times daily with liquids.  If this doesn't help, take senna 2 tablets 1-2 times a day, or docusate 200mg twice daily. Lastly, try milk of magnesium one dose a day.    Bowel obstruction or ileus is characterized by persistent abdominal cramps, bloating, constipation, nausea, or vomiting. If the symptoms are mild, you should restrict your dietary intake to only liquids, and avoid solid food for 2-3 days. If the symptoms are more severe or persist beyond 24 hours, please call your surgeon's office for advice.    Frequent stools and loose stools are best managed by using bulking agents or anti-diarrheal medication. Please call your surgeon if diarrhea  is not improving after a few weeks to discuss starting one of the medications below.  Benefiber®, Citrucel®, Fibercon®, Konsyl®, and Metamucil® are bulking agents that are available at most grocery stores and pharmacies. The medication should be mixed using one (1) teaspoon of the powder in the minimum amount of fluid required to dissolve the agent and taken 1-2 times each day. Benefiber® can be alternatively sprinkled over food 1-2 times each day.  Imodium® (loperamide) is also available without a prescription. It is most effective if taken before meals. You should not take more than eight (8) tablets (32 mg) of Imodium in a 24-hour period. Please call your surgeon's office if you start taking Imodium®.     Dehydration can commonly occur, and its symptoms or signs include dark urine, dizziness when standing, dry mouth, increased heart rate, leg cramps, and low volumes (less than 800 ml) of urine. If these occur, you should immediately call your surgeon's office. To avoid dehydration, you should do the following:  Drink a variety of fluids. Use an oral rehydration salt solution like Pedialyte, G2 Gatorade, Nuun tabs, etc. and sip the solution between meals.  Eat salty foods or add salt to your food.  Use an anti-diarrheal medication or bulking agent as previously instructed by your surgeon.    Follow up:  You will return to clinic to discuss and prepare for surgery with colorectal and with obgyn. Call [phone] for worsening pain, inability to urinate, or fever > 101.  Call or schedule follow up in about 7 days via [employer] if you are not otherwise contacted or see an appointment in the portal.

## 2025-03-17 ENCOUNTER — TELEPHONE (OUTPATIENT)
Dept: GYNECOLOGIC ONCOLOGY | Facility: CLINIC | Age: 60
End: 2025-03-17
Payer: COMMERCIAL

## 2025-03-26 ENCOUNTER — PATIENT MESSAGE (OUTPATIENT)
Dept: SURGERY | Facility: CLINIC | Age: 60
End: 2025-03-26
Payer: COMMERCIAL

## 2025-03-28 ENCOUNTER — TELEPHONE (OUTPATIENT)
Dept: SURGERY | Facility: CLINIC | Age: 60
End: 2025-03-28
Payer: MEDICAID

## 2025-03-28 NOTE — TELEPHONE ENCOUNTER
Completed letter to [employer] for appeal, gathered information from hospital admission. Faxed and emailed.

## 2025-03-28 NOTE — TELEPHONE ENCOUNTER
Called [phone] for appeal per appeal letter in media. Spoke with customer service and was transferred to peer to peer line, phone number [phone]. Once I was able to speak with someone, they told me I was out of the time frame for peer to peer which ended on the 16th. They told me to call the appeal line again. This was a 10 minute process.   Called [phone], spent 15 minutes getting passed around till I was transferred to an automated line that eventually hung up on me. Called above number again, only to find out an appeal can not be performed over the phone and I will have to gather information and send a fax to [phone] or email to [email or web address].

## 2025-04-01 ENCOUNTER — OFFICE VISIT (OUTPATIENT)
Dept: GYNECOLOGIC ONCOLOGY | Facility: CLINIC | Age: 60
End: 2025-04-01
Payer: MEDICAID

## 2025-04-01 VITALS
HEIGHT: 65 IN | DIASTOLIC BLOOD PRESSURE: 63 MMHG | BODY MASS INDEX: 27.49 KG/M2 | TEMPERATURE: 98 F | HEART RATE: 105 BPM | OXYGEN SATURATION: 97 % | SYSTOLIC BLOOD PRESSURE: 137 MMHG | RESPIRATION RATE: 17 BRPM | WEIGHT: 165 LBS

## 2025-04-01 DIAGNOSIS — N82.4 COLOUTERINE FISTULA: ICD-10-CM

## 2025-04-01 DIAGNOSIS — K57.92 DIVERTICULITIS: Primary | ICD-10-CM

## 2025-04-01 PROCEDURE — 1111F DSCHRG MED/CURRENT MED MERGE: CPT | Mod: CPTII,,, | Performed by: OBSTETRICS & GYNECOLOGY

## 2025-04-01 PROCEDURE — 99999 PR PBB SHADOW E&M-EST. PATIENT-LVL IV: CPT | Mod: PBBFAC,,, | Performed by: OBSTETRICS & GYNECOLOGY

## 2025-04-01 PROCEDURE — 1159F MED LIST DOCD IN RCRD: CPT | Mod: CPTII,,, | Performed by: OBSTETRICS & GYNECOLOGY

## 2025-04-01 PROCEDURE — 99214 OFFICE O/P EST MOD 30 MIN: CPT | Mod: PBBFAC | Performed by: OBSTETRICS & GYNECOLOGY

## 2025-04-01 PROCEDURE — 3075F SYST BP GE 130 - 139MM HG: CPT | Mod: CPTII,,, | Performed by: OBSTETRICS & GYNECOLOGY

## 2025-04-01 PROCEDURE — 99204 OFFICE O/P NEW MOD 45 MIN: CPT | Mod: S$PBB,,, | Performed by: OBSTETRICS & GYNECOLOGY

## 2025-04-01 PROCEDURE — 3008F BODY MASS INDEX DOCD: CPT | Mod: CPTII,,, | Performed by: OBSTETRICS & GYNECOLOGY

## 2025-04-01 PROCEDURE — 3078F DIAST BP <80 MM HG: CPT | Mod: CPTII,,, | Performed by: OBSTETRICS & GYNECOLOGY

## 2025-04-01 RX ORDER — CELECOXIB 200 MG/1
200 CAPSULE ORAL 2 TIMES DAILY
COMMUNITY

## 2025-04-01 NOTE — PROGRESS NOTES
SUBJECTIVE     Chief complaint: Surgical Consult   Referring provider: DAVE Stone MD    History of present Illness:  Lorraine Robb is a 60 y.o.  G0 female presents to clinic for surgical consult. Pt has a hx of diverticular disease, she has had a slow progressing colo-uterine fistula. Colorectal has discussed  segmental resection with hysterectomy and likely need fecal diversion due to the longstanding pelvic sepsis causing increased risk of anastomotic leak. Ideally, we could perform primary anastomosis with proximal diversion. This procedure is planned for 4/21/2025.      Hx of fibroids, denies any abnormal paps.     LMP- 51, no HRT, no vb.     She past medical history significant for diverticulitis.      Denies any abdominal surgeries    Oncology History    No history exists.        Review of Systems   Constitutional:  Negative for chills and fever.   HENT:  Negative for congestion and sore throat.    Respiratory:  Negative for cough and shortness of breath.    Cardiovascular:  Negative for chest pain.   Gastrointestinal:  Negative for diarrhea, nausea and vomiting.   Genitourinary:  Negative for difficulty urinating and dysuria.   Musculoskeletal:  Negative for back pain and neck pain.   Skin:  Negative for rash and wound.   Neurological:  Negative for syncope and headaches.   All other systems reviewed and are negative.     Past Medical History[1]   Past Surgical History[2]   Review of patient's allergies indicates:   Allergen Reactions    Flagyl [metronidazole hcl] Itching and Dermatitis     Current Outpatient Medications   Medication Instructions    celecoxib (CELEBREX) 200 mg, 2 times daily    ferrous sulfate (FEOSOL) Tab tablet 1 each, Oral, See admin instructions    lisinopriL 10 mg, Daily    multivitamin Tab 1 tablet, Oral, Daily    nitrofurantoin, macrocrystal-monohydrate, (MACROBID) 100 MG capsule 100 mg, Oral, 2 times daily    oxyCODONE (ROXICODONE) 5 mg, Oral, Every 12 hours PRN  "    OB History   No obstetric history on file.     Social History[3]   Family History   Problem Relation Name Age of Onset    Diabetes Mother      Hypertension Mother      Cancer Sister      Diabetes Sister       The patient has no Health Maintenance topics of status Not Due  Health Maintenance Due   Topic Date Due    Cervical Cancer Screening  Never done    HIV Screening  Never done    TETANUS VACCINE  Never done    Mammogram  Never done    Pneumococcal Vaccines (Age 50+) (1 of 2 - PCV) Never done    Colorectal Cancer Screening  10/07/2022    Hemoglobin A1c (Diabetic Prevention Screening)  01/17/2023    RSV Vaccine (Age 60+ and Pregnant patients) (1 - Risk 60-74 years 1-dose series) Never done    Lipid Panel  01/17/2025     OBJECTIVE   /63 (BP Location: Left arm, Patient Position: Sitting)   Pulse 105   Temp 97.7 °F (36.5 °C) (Oral)   Resp 17   Ht 5' 5" (1.651 m)   Wt 74.8 kg (165 lb)   SpO2 97%   BMI 27.46 kg/m²     Physical Exam  Vitals and nursing note reviewed. Exam conducted with a chaperone present.   Constitutional:       Appearance: Normal appearance.   HENT:      Head: Normocephalic and atraumatic.   Cardiovascular:      Rate and Rhythm: Normal rate.   Pulmonary:      Effort: Pulmonary effort is normal. No respiratory distress.   Abdominal:      General: Abdomen is flat. There is no distension.      Palpations: Abdomen is soft.      Tenderness: There is no abdominal tenderness.   Musculoskeletal:         General: Normal range of motion.      Cervical back: Normal range of motion and neck supple.   Skin:     General: Skin is warm and dry.      Capillary Refill: Capillary refill takes less than 2 seconds.   Neurological:      General: No focal deficit present.      Mental Status: She is alert. Mental status is at baseline.   Psychiatric:         Mood and Affect: Mood normal.         Behavior: Behavior normal.         Thought Content: Thought content normal.         Judgment: Judgment normal. "        ASSESSMENT      1. Diverticulitis    2. Colouterine fistula      PLAN     Discussed recommendation for FALLON/BSO concurrent with colorectal procedure secondary to complicated colouterine fistula and recurring sepsis. The risks, benefits, and indications of the procedure were discussed with the patient and her family members if present.  These included bleeding, transfusion, infection, damage to surrounding tissues (bowel, bladder, ureter), wound separation, lymphedema, perioperative cardiac events, VTE, pneumonia, and possible death.  We discussed possible need for bowel or urologic resection, temporary or permanent ostomies. She voiced understanding, all questions were answered and consents were signed.    Surgery 25      Zoe Mackenzie MD   Gynecologic Oncology      I spent approximately 45 minutes reviewing the available records and evaluating the patient, out of which over 50% of the time was spent face to face with the patient in counseling and coordinating this patient's care.           [1]   Past Medical History:  Diagnosis Date    Diverticulitis     Hypertension    [2] No past surgical history on file.  [3]   Social History  Tobacco Use    Smoking status: Former     Current packs/day: 0.00     Types: Cigarettes     Quit date: 2017     Years since quittin.2    Smokeless tobacco: Never   Substance Use Topics    Alcohol use: No    Drug use: Not Currently

## 2025-04-02 ENCOUNTER — TELEPHONE (OUTPATIENT)
Dept: GYNECOLOGIC ONCOLOGY | Facility: CLINIC | Age: 60
End: 2025-04-02
Payer: MEDICAID

## 2025-04-03 ENCOUNTER — TELEPHONE (OUTPATIENT)
Dept: GYNECOLOGIC ONCOLOGY | Facility: CLINIC | Age: 60
End: 2025-04-03
Payer: MEDICAID

## 2025-04-10 NOTE — PROGRESS NOTES
CRS Office Visit Follow-up  Referring Md:   DAVE Stone Md  [address] [place] [address]    SUBJECTIVE:     Chief Complaint: colouterine -vesicular fistula    History of Present Illness:  Patient is a 60 y.o. female presents with colo-uterine-vesicular fistula. The patient is a established patient to this practice.     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  She continues to be active.  Her weight is stable.  Nondiabetic.  Nonsmoker.     In review of her prior CT scans, it appears that she has had longstanding diverticular disease with slow progression of the abscess through the uterus.    Last colonoscopy was maybe 7 years ago with benign polyps per patient but she is not certain. Denies family history of CRC or IBD.     03/13/2025: She was admitted to the hospital for evaluation for pneumaturia.  CT scan with sigmoid colon to uterine to bladder fistula.  Chronically hypoalbuminemic with albumin of 2.3.    04/11/2025:  Presents for preoperative visit.  Continues to have pneumaturia but no fecal urea.  She is having 2 bowel movements per day.  No issues with fecal incontinence.  Her weight has been stable.  Continues to be active.  Nonsmoker.  No family history of colorectal cancer.  Does complain of right leg pain with standing.      Review of Systems:  Review of Systems   Constitutional:  Negative for chills, diaphoresis, fever, malaise/fatigue and weight loss.   HENT:  Negative for congestion.    Respiratory:  Negative for shortness of breath.    Cardiovascular:  Negative for chest pain and leg swelling.   Gastrointestinal:  Negative for abdominal pain, blood in stool, constipation, nausea and vomiting.   Genitourinary:  Positive for dysuria and frequency.   Musculoskeletal:  Negative for back pain and  "myalgias.   Skin:  Negative for rash.   Neurological:  Negative for dizziness and weakness.   Endo/Heme/Allergies:  Does not bruise/bleed easily.   Psychiatric/Behavioral:  Negative for depression.        OBJECTIVE:     Vital Signs (Most Recent)  /80 (BP Location: Left arm, Patient Position: Sitting)   Pulse 105   Ht 5' 5" (1.651 m)   Wt 75.3 kg (166 lb 0.1 oz)   BMI 27.62 kg/m²     Physical Exam:  General: Black or  female in no distress   Neuro: alert and oriented x 4.  Moves all extremities.     HEENT: no icterus.  Trachea midline  Respiratory: respirations are even and unlabored  Cardiac: regular rate  Abdomen:  Soft, nontender, no masses  Extremities: Warm dry and intact  Skin: no rashes  Anorectal:  Deferred    Labs:  H&H 8 and 26.  Albumin 2.3.  Normal renal function.    Imaging:  CT scan 03/12/2025 personally reviewed as above      ASSESSMENT/PLAN:     Lorraine was seen today for anal fissure.    Diagnoses and all orders for this visit:    Diverticulitis    Leg pain, central, right  -     US Lower Extremity Veins Bilateral; Future        60 y.o. 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  Longstanding pelvic sepsis associated with significant hypoalbuminemia.  Resection was therefore indicated as well as fecal diversion with a loop ileostomy.  Will plan for preoperative bilateral catheter placement of the ureters due to longstanding pelvic sepsis.  We should also start with a flexible sigmoidoscopy to evaluate the distal rectum.    The procedure was discussed in detail with her today.  Diversion with either a colostomy or loop ileostomy was recommended given her anemia and chronic malnutrition.  We discussed the risks of anastomotic leak, bleeding, change in her bowel habits, damage to surrounding structures, wound infection, hernia.  We discussed a 3 day hospital stay and a 4-6 week total recovery.    DAVE Stone MD, FACS, FASCRS  Staff " Surgeon  Colon & Rectal Surgery

## 2025-04-11 ENCOUNTER — TELEPHONE (OUTPATIENT)
Dept: SURGERY | Facility: CLINIC | Age: 60
End: 2025-04-11
Payer: MEDICAID

## 2025-04-11 ENCOUNTER — OFFICE VISIT (OUTPATIENT)
Dept: SURGERY | Facility: CLINIC | Age: 60
End: 2025-04-11
Payer: MEDICAID

## 2025-04-11 VITALS
BODY MASS INDEX: 27.66 KG/M2 | DIASTOLIC BLOOD PRESSURE: 80 MMHG | WEIGHT: 166 LBS | HEART RATE: 105 BPM | SYSTOLIC BLOOD PRESSURE: 131 MMHG | HEIGHT: 65 IN

## 2025-04-11 DIAGNOSIS — M79.604 LEG PAIN, CENTRAL, RIGHT: ICD-10-CM

## 2025-04-11 DIAGNOSIS — K57.92 DIVERTICULITIS: Primary | ICD-10-CM

## 2025-04-11 PROCEDURE — 99999 PR PBB SHADOW E&M-EST. PATIENT-LVL III: CPT | Mod: PBBFAC,,, | Performed by: COLON & RECTAL SURGERY

## 2025-04-11 PROCEDURE — 99213 OFFICE O/P EST LOW 20 MIN: CPT | Mod: PBBFAC | Performed by: COLON & RECTAL SURGERY

## 2025-04-11 RX ORDER — METRONIDAZOLE 500 MG/1
500 TABLET ORAL 2 TIMES DAILY
Qty: 2 TABLET | Refills: 0 | Status: SHIPPED | OUTPATIENT
Start: 2025-04-11

## 2025-04-11 RX ORDER — CIPROFLOXACIN 500 MG/1
500 TABLET ORAL 2 TIMES DAILY
Qty: 2 TABLET | Refills: 0 | Status: SHIPPED | OUTPATIENT
Start: 2025-04-11

## 2025-04-14 ENCOUNTER — PATIENT MESSAGE (OUTPATIENT)
Dept: SURGERY | Facility: CLINIC | Age: 60
End: 2025-04-14
Payer: COMMERCIAL

## 2025-04-14 ENCOUNTER — TELEPHONE (OUTPATIENT)
Dept: SURGERY | Facility: CLINIC | Age: 60
End: 2025-04-14
Payer: MEDICAID

## 2025-04-15 ENCOUNTER — HOSPITAL ENCOUNTER (EMERGENCY)
Facility: HOSPITAL | Age: 60
Discharge: HOME | End: 2025-04-15
Attending: EMERGENCY MEDICINE
Payer: MEDICAID

## 2025-04-15 ENCOUNTER — OFFICE VISIT (OUTPATIENT)
Dept: SURGERY | Facility: CLINIC | Age: 60
End: 2025-04-15
Payer: MEDICAID

## 2025-04-15 VITALS
TEMPERATURE: 98 F | HEIGHT: 65 IN | WEIGHT: 165 LBS | OXYGEN SATURATION: 98 % | BODY MASS INDEX: 27.49 KG/M2 | RESPIRATION RATE: 16 BRPM | HEART RATE: 87 BPM | SYSTOLIC BLOOD PRESSURE: 140 MMHG | DIASTOLIC BLOOD PRESSURE: 71 MMHG

## 2025-04-15 VITALS
BODY MASS INDEX: 27.63 KG/M2 | DIASTOLIC BLOOD PRESSURE: 77 MMHG | HEIGHT: 65 IN | WEIGHT: 165.81 LBS | HEART RATE: 91 BPM | SYSTOLIC BLOOD PRESSURE: 131 MMHG

## 2025-04-15 DIAGNOSIS — I82.413 ACUTE DEEP VEIN THROMBOSIS (DVT) OF FEMORAL VEIN OF BOTH LOWER EXTREMITIES: Primary | ICD-10-CM

## 2025-04-15 DIAGNOSIS — K57.92 DIVERTICULITIS: ICD-10-CM

## 2025-04-15 DIAGNOSIS — Z71.89 ENCOUNTER FOR OSTOMY CARE EDUCATION: Primary | ICD-10-CM

## 2025-04-15 PROCEDURE — 3008F BODY MASS INDEX DOCD: CPT | Mod: CPTII,,, | Performed by: NURSE PRACTITIONER

## 2025-04-15 PROCEDURE — 3078F DIAST BP <80 MM HG: CPT | Mod: CPTII,,, | Performed by: NURSE PRACTITIONER

## 2025-04-15 PROCEDURE — 3075F SYST BP GE 130 - 139MM HG: CPT | Mod: CPTII,,, | Performed by: NURSE PRACTITIONER

## 2025-04-15 PROCEDURE — 99999 PR PBB SHADOW E&M-EST. PATIENT-LVL III: CPT | Mod: PBBFAC,,, | Performed by: NURSE PRACTITIONER

## 2025-04-15 PROCEDURE — 99213 OFFICE O/P EST LOW 20 MIN: CPT | Mod: PBBFAC | Performed by: NURSE PRACTITIONER

## 2025-04-15 PROCEDURE — 99283 EMERGENCY DEPT VISIT LOW MDM: CPT | Mod: 27

## 2025-04-15 PROCEDURE — 99213 OFFICE O/P EST LOW 20 MIN: CPT | Mod: S$PBB,,, | Performed by: NURSE PRACTITIONER

## 2025-04-15 PROCEDURE — 1160F RVW MEDS BY RX/DR IN RCRD: CPT | Mod: CPTII,,, | Performed by: NURSE PRACTITIONER

## 2025-04-15 PROCEDURE — 1159F MED LIST DOCD IN RCRD: CPT | Mod: CPTII,,, | Performed by: NURSE PRACTITIONER

## 2025-04-15 RX ORDER — APIXABAN 5 MG (74)
KIT ORAL
Qty: 74 TABLET | Refills: 0 | Status: SHIPPED | OUTPATIENT
Start: 2025-04-15

## 2025-04-15 NOTE — PROGRESS NOTES
"CRS Office Visit History and Physical    Referring Md:   DAVE Stone Md  [address] [place] [address]    SUBJECTIVE:     Chief Complaint: ileostomy marking    History of Present Illness:  The patient is known patient to this practice.   Course is as follows:  Patient is a 60 y.o. female presents for ileostomy marking for sx next week.      Review of patient's allergies indicates:   Allergen Reactions    Flagyl [metronidazole hcl] Itching and Dermatitis       Past Medical History:   Diagnosis Date    Diverticulitis     Hypertension      History reviewed. No pertinent surgical history.  Family History   Problem Relation Name Age of Onset    Diabetes Mother      Hypertension Mother      Cancer Sister      Diabetes Sister       Social History[1]     Review of Systems:  Review of Systems   Gastrointestinal:         Ileostomy marking       OBJECTIVE:     Vital Signs (Most Recent)  /77 (BP Location: Left arm, Patient Position: Sitting)   Pulse 91   Ht 5' 5" (1.651 m)   Wt 75.2 kg (165 lb 12.6 oz)   BMI 27.59 kg/m²     Physical Exam:  General: Black or  female in no distress   Neuro: Alert and oriented to person, place, and time.  Moves all extremities.     HEENT: No icterus.  Trachea midline  Respiratory: Respirations are even and unlabored, no cough or audible wheezing  Abdomen:    4/15/25    Labs reviewed today:  Lab Results   Component Value Date    WBC 10.64 03/15/2025    HGB 8.2 (L) 03/15/2025    HCT 26.1 (L) 03/15/2025     (H) 03/15/2025    ALT 7 (L) 03/15/2025    AST 11 03/15/2025     03/15/2025    K 3.5 03/15/2025     03/15/2025    CREATININE 0.9 03/15/2025    BUN 9 03/15/2025    CO2 22 (L) 03/15/2025    TSH 0.946 10/05/2021    INR 1.1 10/12/2021         ASSESSMENT/PLAN:     Diagnoses and all orders for this visit:    Encounter for ostomy care education    Diverticulitis        Pre op Ostomy marking:    This patient was seen today per request " Dr. Stone in preparation for upcoming ostomy surgery on 25  Explained procedure for stoma siting and rationale. Simulated appliance use with empty pouch provided. Abdomen examined with patient sitting, standing and lying down. Observed abdomen, contours, location of belt line, with in visual field and rectus muscle palpation considered.  Stoma marking/siting was performed per protocol and patient marked with a permanent marker and skin staining with silver nitrate. Explained that final decision for stomal placement is up to surgeon's discretion.     The proposed surgery was discussed and patient educated on expected postoperative course and expectations. The patient was allowed to ask questions and was also given pre-op information kit from  the [employer] for review at home prior to surgery.      Danay Mendez, KANEP-C  Colon and Rectal Surgery             [1]   Social History  Tobacco Use    Smoking status: Former     Current packs/day: 0.00     Types: Cigarettes     Quit date: 2017     Years since quittin.2     Passive exposure: Past    Smokeless tobacco: Never   Substance Use Topics    Alcohol use: No    Drug use: Not Currently

## 2025-04-15 NOTE — ED NOTES
Pt is D/C home with a prescription for apixban. Resident went over D/C information. No signs of respiratory distress noted. Pt ambulated independently to the lobby.

## 2025-04-15 NOTE — ED NOTES
Pt presented to the ED for a referral to have stents put in her bilateral legs ahead of colorectal surgery on Monday. No complaints of pain or abdominal pain. No signs of respiratory distress noted.

## 2025-04-15 NOTE — ED PROVIDER NOTES
Encounter Date: 4/15/2025       History     Chief Complaint   Patient presents with    Referral     Pt is having colorectal surgery completed on Monday and  discovered she has clots in BLE and was referred to ED for further evaluation and possible filter placement prior to upcoming procedure. No complaints of pain discomfort.      HPI  60-year-old female presents to the emergency department after being told to come in due to imaging result by her nurse practitioner.  Patient was found earlier today to have bilateral lower extremity DVTs, nurse practitioner called the patient told her to present to the emergency department.  Patient was seen earlier today at [facility] ED, she presented to [place] ED. no history of blood clots, active cancer, long periods of inactivity and bed rest.    She has no complaints today.    Her legs do not hurt, she was able to ambulate into the ED today without pain.    Denies JAMIL, LOC, SOB, Falls, Trauma, hemoptysis, hematemis, melena, fever, chills, nausea, vomiting, diarrhea.           Review of patient's allergies indicates:   Allergen Reactions    Flagyl [metronidazole hcl] Itching and Dermatitis     Past Medical History:   Diagnosis Date    Diverticulitis     Hypertension      No past surgical history on file.  Family History   Problem Relation Name Age of Onset    Diabetes Mother      Hypertension Mother      Cancer Sister      Diabetes Sister       Social History[1]  Review of Systems    Physical Exam     Initial Vitals [04/15/25 1500]   BP Pulse Resp Temp SpO2   (!) 140/71 87 16 97.9 °F (36.6 °C) 98 %      MAP       --         Physical Exam    Nursing note and vitals reviewed.  Constitutional: She appears well-developed and well-nourished.   HENT:   Head: Normocephalic and atraumatic.   Right Ear: External ear normal.   Left Ear: External ear normal.   Eyes: EOM are normal. Right eye exhibits no discharge. Left eye exhibits no discharge. No scleral icterus.   Neck: Neck supple.  No tracheal deviation present. No JVD present.   Cardiovascular:  Normal heart sounds and intact distal pulses.     Exam reveals no gallop and no friction rub.       No murmur heard.  Pulses:       Radial pulses are 2+ on the right side and 2+ on the left side.        Dorsalis pedis pulses are 2+ on the right side and 2+ on the left side.   Pulmonary/Chest: Breath sounds normal. No stridor. No respiratory distress. She has no wheezes. She has no rhonchi. She has no rales. She exhibits no tenderness.   Abdominal: Abdomen is soft. She exhibits no distension and no mass. There is no abdominal tenderness. There is no rebound and no guarding.   Musculoskeletal:         General: Normal range of motion.      Cervical back: Neck supple.     Lymphadenopathy:     She has no cervical adenopathy.   Neurological: She is alert and oriented to person, place, and time. GCS score is 15.   Skin: Skin is warm and dry. Capillary refill takes less than 2 seconds.   Psychiatric: She has a normal mood and affect. Her behavior is normal. Judgment and thought content normal.         ED Course   Procedures  Labs Reviewed - No data to display       Imaging Results    None          Medications - No data to display  Medical Decision Making  Risk  Prescription drug management.                       60-year-old female presents to the ED per provider instructions.    Differential diagnosis for this patient includes but isn't limited to DVT that has unprovoked, we also considered provoked DVT, we considered arterial blood clot however patient has strong pedal pulses.  In addition patient has no pain today.    I discussed case with her surgeon, he thinks that she should go on anticoagulation and they should reschedule the surgery.    Anticoagulation sent to pharmacy, I discussed with the patient the risks of anticoagulation, patient verbalizes understanding.    I considered imaging however patient just had imaging so this would be unlikely to yield  diagnostic value.  I considered lab testing however patient has no complaints today and no contraindications to anticoagulation.    After visit summary printed and given to patient, patient verbalizes understanding of return precautions.  Vital signs stable at time of discharge, questions answered. \                       Clinical Impression:  Final diagnoses:  [I82.413] Acute deep vein thrombosis (DVT) of femoral vein of both lower extremities (Primary)          ED Disposition Condition    Discharge Stable          ED Prescriptions       Medication Sig Dispense Start Date End Date Auth. Provider    apixaban (ELIQUIS DVT-PE TREAT 30D START) 5 mg (74 tabs) DsPk For the first 7 days take two 5 mg tablets twice daily.  After 7 days take one 5 mg tablet twice daily. 74 tablet 4/15/2025 -- Vargas Wilson MD          Follow-up Information    None              [1]   Social History  Tobacco Use    Smoking status: Former     Current packs/day: 0.00     Types: Cigarettes     Quit date: 2017     Years since quittin.2     Passive exposure: Past    Smokeless tobacco: Never   Substance Use Topics    Alcohol use: No    Drug use: Not Currently        Vargas Wilson MD  Resident  04/15/25 3081

## 2025-04-16 ENCOUNTER — ANESTHESIA EVENT (OUTPATIENT)
Dept: SURGERY | Facility: HOSPITAL | Age: 60
DRG: 329 | End: 2025-04-16
Payer: MEDICAID

## 2025-04-16 ENCOUNTER — TELEPHONE (OUTPATIENT)
Dept: SURGERY | Facility: CLINIC | Age: 60
End: 2025-04-16
Payer: MEDICAID

## 2025-04-16 DIAGNOSIS — I82.403 ACUTE DEEP VEIN THROMBOSIS (DVT) OF BOTH LOWER EXTREMITIES, UNSPECIFIED VEIN: Primary | ICD-10-CM

## 2025-04-16 NOTE — TELEPHONE ENCOUNTER
Attempted to call patient to discuss recent DVTs as well as upcoming surgery.    We will need to delay her surgery until her DVT can be stabilized.  I will see her back in clinic in 4 weeks with DVT ultrasound at that time.  As long as she has persistent DVTs at that time, we will then plan for IVC filter.    DVAE Stone MD, FACS, FASCRS  Staff Surgeon  Colon & Rectal Surgery

## 2025-04-21 ENCOUNTER — ANESTHESIA (OUTPATIENT)
Dept: SURGERY | Facility: HOSPITAL | Age: 60
DRG: 329 | End: 2025-04-21
Payer: MEDICAID

## 2025-04-23 ENCOUNTER — TELEPHONE (OUTPATIENT)
Dept: SURGERY | Facility: CLINIC | Age: 60
End: 2025-04-23
Payer: COMMERCIAL

## 2025-04-23 DIAGNOSIS — I82.403 ACUTE DEEP VEIN THROMBOSIS (DVT) OF BOTH LOWER EXTREMITIES, UNSPECIFIED VEIN: Primary | ICD-10-CM

## 2025-04-23 NOTE — TELEPHONE ENCOUNTER
RN called pt back.  Pt is asking for an update re: Dr. Stone getting a team together to place an IVC Filter.  Pt was told that she has an upcoming appt on May 13, but she would like an update before then.  Pt would also like to address with MD, possibly going back to work in the meantime before her surgery.  RN will send a message to Dr. Stone and Nurse Sparkle to f/u with pt. Pt verbalized understanding to all.

## 2025-04-23 NOTE — TELEPHONE ENCOUNTER
Called patient.  Will plan to wait 3 months for anticoagulation for her acute bilateral DVTs after speaking with vascular Medicine.  I will see her back in 2 months for interval evaluation with a DVT ultrasound prior.  As long as she has significant improvement, we will plan to hold her Eliquis at the time of her surgery.  If she has a significant clot burden that remains, we will plan for IVC filter placement.  I have reached out for gyn Oncology regarding new dates in late July    DAVE Stone MD, FACS, FASCRS  Staff Surgeon  Colon & Rectal Surgery        ----- Message from BHAVIN Juarez sent at 4/23/2025 10:55 AM CDT -----  Regarding: FW: Schedule Procedure  Good morning Dr. Stone,I saw your note in the pt's chart.  She told me that you were getting a team together to place an IVC Filter.  She is asking for an update.  I told her she has an appt coming up on May 13, but she would like an update before then.  She also wanted to speak with you about possibly going back to work in the meantime before her surgery.  I told her you would be in the OR all day today, and you or Sparkle will contact her when time permits.Thank you,Larry  ----- Message -----  From: José Antonio Kay  Sent: 4/23/2025   8:14 AM CDT  To: Bertha Carrizales Staff  Subject: Schedule Procedure                               Who call ? Lorraine Young is the request Details : Pt calling to speak with someone in provider office regards scheduling an procedure. States she need a letter for work stating her illness and she was scheduled for the procedure but was canceled due to blood clots.  Please call pt back.    Can clinic  use patient portal  : No What number to call back : [phone]

## 2025-04-24 ENCOUNTER — TELEPHONE (OUTPATIENT)
Dept: SURGERY | Facility: CLINIC | Age: 60
End: 2025-04-24
Payer: COMMERCIAL

## 2025-04-24 NOTE — TELEPHONE ENCOUNTER
RN spoke with pt and let her know that her letter will be mailed out to her to return to work on 5/1/25.  Pt requested it also be sent to her email address at [email or web address].  RN also called pt to let her know that a strange OON message is popping up when trying to schedule her US at [facility].  Pt stated that she has tried repeatedly to get it changed, but [facility] still has her old insurance listed in her chart when her primary should be Medicaid.  RN will work on getting pt scheduled and will give her a call back.  Pt will have f/u in clinic with Dr. Stone on 7/1 at 940am at the [facility].  All appt details reviewed.  Pt verbalized understanding to all.

## 2025-05-12 ENCOUNTER — TELEPHONE (OUTPATIENT)
Dept: GYNECOLOGIC ONCOLOGY | Facility: CLINIC | Age: 60
End: 2025-05-12
Payer: COMMERCIAL

## 2025-05-19 DIAGNOSIS — I50.32 CHRONIC HEART FAILURE WITH PRESERVED EJECTION FRACTION: Primary | ICD-10-CM

## 2025-05-19 RX ORDER — APIXABAN 5 MG (74)
KIT ORAL
Qty: 74 TABLET | Refills: 0 | Status: CANCELLED | OUTPATIENT
Start: 2025-05-19

## 2025-06-17 ENCOUNTER — TELEPHONE (OUTPATIENT)
Dept: SURGERY | Facility: CLINIC | Age: 60
End: 2025-06-17
Payer: COMMERCIAL

## 2025-06-17 NOTE — TELEPHONE ENCOUNTER
Spoke with patient. She is at work and requested I send the financial counselor information to her email. Email sent.

## 2025-06-30 NOTE — PROGRESS NOTES
CRS Office Visit Follow-up  Referring Md:   No referring provider defined for this encounter.    SUBJECTIVE:     Chief Complaint: colouterine -vesicular fistula    History of Present Illness:  Patient is a 60 y.o. female presents with colo-uterine-vesicular fistula. The patient is a established patient to this practice.     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  She continues to be active.  Her weight is stable.  Nondiabetic.  Nonsmoker.     In review of her prior CT scans, it appears that she has had longstanding diverticular disease with slow progression of the abscess through the uterus.    Last colonoscopy was maybe 7 years ago with benign polyps per patient but she is not certain. Denies family history of CRC or IBD.     03/13/2025: She was admitted to the hospital for evaluation for pneumaturia.  CT scan with sigmoid colon to uterine to bladder fistula.  Chronically hypoalbuminemic with albumin of 2.3.    04/11/2025:  Presents for preoperative visit.  Continues to have pneumaturia but no fecal urea.  She is having 2 bowel movements per day.  No issues with fecal incontinence.  Her weight has been stable.  Continues to be active.  Nonsmoker.  No family history of colorectal cancer.  Does complain of right leg pain with standing.    7/1/2025:  Plan for colectomy was postponed secondary to acute bilateral DVTs treated with 3 months of anticoagulation.  She completed her Eliquis and has stopped 3 weeks ago.  She has been on low-dose aspirin ever since.  She is doing well.  No further leg pain.  She is active.  No abdominal pain.  No urinary symptoms or vaginal symptoms.  She is active and walks 5 miles daily.  Has returned to work.      Review of Systems:  Review of Systems   Constitutional:  Negative for chills, diaphoresis, fever,  "malaise/fatigue and weight loss.   HENT:  Negative for congestion.    Respiratory:  Negative for shortness of breath.    Cardiovascular:  Negative for chest pain and leg swelling.   Gastrointestinal:  Negative for abdominal pain, blood in stool, constipation, nausea and vomiting.   Genitourinary:  Positive for dysuria and frequency.   Musculoskeletal:  Negative for back pain and myalgias.   Skin:  Negative for rash.   Neurological:  Negative for dizziness and weakness.   Endo/Heme/Allergies:  Does not bruise/bleed easily.   Psychiatric/Behavioral:  Negative for depression.        OBJECTIVE:     Vital Signs (Most Recent)  /61 (BP Location: Left arm, Patient Position: Sitting)   Pulse 94   Ht 5' 5" (1.651 m)   Wt 74.3 kg (163 lb 14.6 oz)   SpO2 100%   BMI 27.28 kg/m²     Physical Exam:  General: Black or  female in no distress   Neuro: alert and oriented x 4.  Moves all extremities.     HEENT: no icterus.  Trachea midline  Respiratory: respirations are even and unlabored  Cardiac: regular rate  Abdomen:  Soft, nontender, no masses  Extremities: Warm dry and intact  Skin: no rashes  Anorectal:  Deferred    Labs:  H&H 8 and 26.  Albumin 2.3.  Normal renal function.    Imaging:  CT scan 03/12/2025 personally reviewed as above      ASSESSMENT/PLAN:     Diagnoses and all orders for this visit:    Diverticulitis  -     Comprehensive Metabolic Panel; Future  -     CBC Auto Differential; Future    Hypoalbuminemia  -     Prealbumin; Future          60 y.o. 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  Longstanding pelvic sepsis associated with significant hypoalbuminemia.  We initially planned for resection as well as fecal diversion due to persistent hypoalbuminemia.  She then developed a DVT and was treated with 3 months of anticoagulation.  During that time, she has recovered well.  Her energy level has returned to normal.  She is active.  Her weight has been " stable.  Will recheck her blood work today.  If her nutrition has improved, we will be able to avoid ostomy creation.  We re-reviewed sigmoid colectomy as well as hysterectomy and repair of the bladder fistula.  Will plan to start with bilateral ureteral catheters.  All questions answered.      DAVE Stone MD, FACS, FASCRS  Staff Surgeon  Colon & Rectal Surgery

## 2025-07-01 ENCOUNTER — RESULTS FOLLOW-UP (OUTPATIENT)
Dept: SURGERY | Facility: CLINIC | Age: 60
End: 2025-07-01

## 2025-07-01 ENCOUNTER — LAB VISIT (OUTPATIENT)
Dept: LAB | Facility: HOSPITAL | Age: 60
End: 2025-07-01
Attending: COLON & RECTAL SURGERY
Payer: COMMERCIAL

## 2025-07-01 ENCOUNTER — OFFICE VISIT (OUTPATIENT)
Dept: SURGERY | Facility: CLINIC | Age: 60
End: 2025-07-01
Payer: MEDICAID

## 2025-07-01 VITALS
DIASTOLIC BLOOD PRESSURE: 61 MMHG | SYSTOLIC BLOOD PRESSURE: 132 MMHG | HEART RATE: 94 BPM | HEIGHT: 65 IN | WEIGHT: 163.94 LBS | OXYGEN SATURATION: 100 % | BODY MASS INDEX: 27.31 KG/M2

## 2025-07-01 DIAGNOSIS — E88.09 HYPOALBUMINEMIA: ICD-10-CM

## 2025-07-01 DIAGNOSIS — K57.92 DIVERTICULITIS: Primary | ICD-10-CM

## 2025-07-01 DIAGNOSIS — K57.92 DIVERTICULITIS: ICD-10-CM

## 2025-07-01 LAB
ABSOLUTE EOSINOPHIL (OHS): 0.21 K/UL
ABSOLUTE MONOCYTE (OHS): 0.54 K/UL (ref 0.3–1)
ABSOLUTE NEUTROPHIL COUNT (OHS): 5.59 K/UL (ref 1.8–7.7)
ALBUMIN SERPL BCP-MCNC: 2.6 G/DL (ref 3.5–5.2)
ALP SERPL-CCNC: 68 UNIT/L (ref 40–150)
ALT SERPL W/O P-5'-P-CCNC: 6 UNIT/L (ref 10–44)
ANION GAP (OHS): 7 MMOL/L (ref 8–16)
AST SERPL-CCNC: 8 UNIT/L (ref 11–45)
BASOPHILS # BLD AUTO: 0.04 K/UL
BASOPHILS NFR BLD AUTO: 0.5 %
BILIRUB SERPL-MCNC: 0.2 MG/DL (ref 0.1–1)
BUN SERPL-MCNC: 16 MG/DL (ref 8–23)
CALCIUM SERPL-MCNC: 8.8 MG/DL (ref 8.7–10.5)
CHLORIDE SERPL-SCNC: 108 MMOL/L (ref 95–110)
CO2 SERPL-SCNC: 24 MMOL/L (ref 23–29)
CREAT SERPL-MCNC: 1.1 MG/DL (ref 0.5–1.4)
ERYTHROCYTE [DISTWIDTH] IN BLOOD BY AUTOMATED COUNT: 15.4 % (ref 11.5–14.5)
GFR SERPLBLD CREATININE-BSD FMLA CKD-EPI: 57 ML/MIN/1.73/M2
GLUCOSE SERPL-MCNC: 72 MG/DL (ref 70–110)
HCT VFR BLD AUTO: 24.2 % (ref 37–48.5)
HGB BLD-MCNC: 7 GM/DL (ref 12–16)
IMM GRANULOCYTES # BLD AUTO: 0.02 K/UL (ref 0–0.04)
IMM GRANULOCYTES NFR BLD AUTO: 0.3 % (ref 0–0.5)
LYMPHOCYTES # BLD AUTO: 1.58 K/UL (ref 1–4.8)
MCH RBC QN AUTO: 26.9 PG (ref 27–31)
MCHC RBC AUTO-ENTMCNC: 28.9 G/DL (ref 32–36)
MCV RBC AUTO: 93 FL (ref 82–98)
NUCLEATED RBC (/100WBC) (OHS): 0 /100 WBC
PLATELET # BLD AUTO: 418 K/UL (ref 150–450)
PMV BLD AUTO: 9 FL (ref 9.2–12.9)
POTASSIUM SERPL-SCNC: 3.8 MMOL/L (ref 3.5–5.1)
PREALB SERPL-MCNC: 15 MG/DL (ref 20–43)
PROT SERPL-MCNC: 7.8 GM/DL (ref 6–8.4)
RBC # BLD AUTO: 2.6 M/UL (ref 4–5.4)
RELATIVE EOSINOPHIL (OHS): 2.6 %
RELATIVE LYMPHOCYTE (OHS): 19.8 % (ref 18–48)
RELATIVE MONOCYTE (OHS): 6.8 % (ref 4–15)
RELATIVE NEUTROPHIL (OHS): 70 % (ref 38–73)
SODIUM SERPL-SCNC: 139 MMOL/L (ref 136–145)
WBC # BLD AUTO: 7.98 K/UL (ref 3.9–12.7)

## 2025-07-01 PROCEDURE — 3075F SYST BP GE 130 - 139MM HG: CPT | Mod: CPTII,,, | Performed by: COLON & RECTAL SURGERY

## 2025-07-01 PROCEDURE — 84134 ASSAY OF PREALBUMIN: CPT

## 2025-07-01 PROCEDURE — 85025 COMPLETE CBC W/AUTO DIFF WBC: CPT

## 2025-07-01 PROCEDURE — 82040 ASSAY OF SERUM ALBUMIN: CPT

## 2025-07-01 PROCEDURE — 99213 OFFICE O/P EST LOW 20 MIN: CPT | Mod: PBBFAC | Performed by: COLON & RECTAL SURGERY

## 2025-07-01 PROCEDURE — 3078F DIAST BP <80 MM HG: CPT | Mod: CPTII,,, | Performed by: COLON & RECTAL SURGERY

## 2025-07-01 PROCEDURE — 3008F BODY MASS INDEX DOCD: CPT | Mod: CPTII,,, | Performed by: COLON & RECTAL SURGERY

## 2025-07-01 PROCEDURE — 36415 COLL VENOUS BLD VENIPUNCTURE: CPT

## 2025-07-01 PROCEDURE — 1159F MED LIST DOCD IN RCRD: CPT | Mod: CPTII,,, | Performed by: COLON & RECTAL SURGERY

## 2025-07-01 PROCEDURE — 99213 OFFICE O/P EST LOW 20 MIN: CPT | Mod: S$PBB,,, | Performed by: COLON & RECTAL SURGERY

## 2025-07-01 PROCEDURE — 99999 PR PBB SHADOW E&M-EST. PATIENT-LVL III: CPT | Mod: PBBFAC,,, | Performed by: COLON & RECTAL SURGERY

## 2025-07-01 PROCEDURE — 1160F RVW MEDS BY RX/DR IN RCRD: CPT | Mod: CPTII,,, | Performed by: COLON & RECTAL SURGERY

## 2025-07-08 ENCOUNTER — TELEPHONE (OUTPATIENT)
Dept: SURGERY | Facility: CLINIC | Age: 60
End: 2025-07-08
Payer: COMMERCIAL

## 2025-07-08 NOTE — TELEPHONE ENCOUNTER
Copied from CRM #[id number]. Topic: General Inquiry - Test Results  >> Jul 8, 2025 10:13 AM Beti wrote:  Pt is calling to get her lab test results.  Please call pt to give  results.  Also pt is requesting a scan be done on her legs to check for blood clots before surgery.    Thank you

## 2025-07-08 NOTE — TELEPHONE ENCOUNTER
Spoke with patient and relayed information regarding labs from portal. Message sent to Dr. Stone regarding ultrasound. Explained that I will call her back with plan of care. Patient verbalizes understanding.

## 2025-07-08 NOTE — TELEPHONE ENCOUNTER
Left voicemail with callback number regarding US. Relayed information that insurance denied US. Instructed to call back, if needed.

## 2025-07-08 NOTE — TELEPHONE ENCOUNTER
----- Message from DAVE Stone MD sent at 7/8/2025 10:41 AM CDT -----  Insurances has denied it.  She has been treated for 3 months and has no further symptoms.    All  ----- Message -----  From: Sparkle Guerra RN  Sent: 7/8/2025  10:32 AM CDT  To: DAVE Stone MD    She would like a repeat US on legs  prior to surgery. Please advise.

## 2025-07-14 ENCOUNTER — TELEPHONE (OUTPATIENT)
Dept: GASTROENTEROLOGY | Facility: CLINIC | Age: 60
End: 2025-07-14
Payer: COMMERCIAL

## 2025-07-14 NOTE — TELEPHONE ENCOUNTER
Spoke with patient. Never having seen  before patient will have to reach out to her PCP to have ultrasound of legs ordered.  Rosey

## 2025-07-14 NOTE — TELEPHONE ENCOUNTER
Copied from CRM #[id number]. Topic: General Inquiry - Patient Advice  >> Jul 14, 2025  1:50 PM Fawad wrote:  Consult/Advisory     Name Of Caller: patient         Contact Preference: [phone]       Nature of call: patient is requesting US on legs before procedure. 2nd call Please advise thank you

## 2025-07-15 ENCOUNTER — TELEPHONE (OUTPATIENT)
Dept: SURGERY | Facility: CLINIC | Age: 60
End: 2025-07-15
Payer: COMMERCIAL

## 2025-07-15 ENCOUNTER — PATIENT MESSAGE (OUTPATIENT)
Dept: SURGERY | Facility: CLINIC | Age: 60
End: 2025-07-15
Payer: MEDICAID

## 2025-07-17 ENCOUNTER — TELEPHONE (OUTPATIENT)
Dept: SURGERY | Facility: CLINIC | Age: 60
End: 2025-07-17
Payer: COMMERCIAL

## 2025-07-17 RX ORDER — HYDROCODONE BITARTRATE AND ACETAMINOPHEN 10; 325 MG/1; MG/1
1 TABLET ORAL DAILY PRN
COMMUNITY

## 2025-07-17 NOTE — TELEPHONE ENCOUNTER
----- Message from DAVE Stone MD sent at 7/17/2025  2:36 PM CDT -----  Regarding: FW: Preop Antibiotics/Allergy  Can we please let her know just to take the Cipro for her surgery on Monday?  ----- Message -----  From: Josephine Jamison RN  Sent: 7/17/2025  12:40 PM CDT  To: DAVE Stone MD; Bertha Carrizales #  Subject: Preop Antibiotics/Allergy                        Pt is scheduled for Sx on Monday 7/21/2025.    Pt has been prescribed Cipro 500 mg and Flagyl 500 mg to take the pm prior to Sx.    Pt has an Allergy to Flagyl.    Please advise patient if a different Antibiotic is needed.    Thank you,  -KB    Josephine Jamison, BSN, RN  [facility]   [facility]  [phone]

## 2025-07-17 NOTE — PRE-PROCEDURE INSTRUCTIONS
PreOp Instructions given:   - Verbal medication information (what to hold and what to take)   - NPO guidelines And bowel prep instructions given/CRS Dept  - Arrival place directions given; time to be given the day before procedure by the   Surgeon's Office DOSC  - Bathing with antibacterial soap   - Don't wear any jewelry or bring any valuables AM of surgery   - No makeup or moisturizer to face   - No perfume/cologne, powder, lotions or aftershave   Pt. verbalized understanding.   Pt denies any h/o Anesthesia/Sedation complications or side effects.  Patient does not know arrival time.  Explained that this information comes from the surgeon's office and if they haven't heard from them by 2 or 3 pm to call the office.  Patient stated an understanding.     Msg sent to Dr. Stone/Staff re: Ashlie preop

## 2025-07-18 ENCOUNTER — HOSPITAL ENCOUNTER (OUTPATIENT)
Dept: RADIOLOGY | Facility: HOSPITAL | Age: 60
Discharge: HOME | End: 2025-07-18
Payer: COMMERCIAL

## 2025-07-18 DIAGNOSIS — Z86.718 PERSONAL HISTORY OF OTHER VENOUS THROMBOSIS AND EMBOLISM: ICD-10-CM

## 2025-07-18 PROCEDURE — 93970 EXTREMITY STUDY: CPT | Mod: TC

## 2025-07-21 ENCOUNTER — HOSPITAL ENCOUNTER (INPATIENT)
Facility: HOSPITAL | Age: 60
LOS: 3 days | Discharge: HOME | DRG: 329 | End: 2025-07-24
Attending: COLON & RECTAL SURGERY | Admitting: COLON & RECTAL SURGERY
Payer: MEDICAID

## 2025-07-21 DIAGNOSIS — N32.2 BLADDER FISTULA: ICD-10-CM

## 2025-07-21 DIAGNOSIS — K57.92 DIVERTICULITIS: Primary | ICD-10-CM

## 2025-07-21 LAB
ABSOLUTE EOSINOPHIL (OHS): 0.03 K/UL
ABSOLUTE EOSINOPHIL (OHS): 0.1 K/UL
ABSOLUTE MONOCYTE (OHS): 0.48 K/UL (ref 0.3–1)
ABSOLUTE MONOCYTE (OHS): 0.56 K/UL (ref 0.3–1)
ABSOLUTE NEUTROPHIL COUNT (OHS): 13.46 K/UL (ref 1.8–7.7)
ABSOLUTE NEUTROPHIL COUNT (OHS): 4.01 K/UL (ref 1.8–7.7)
ALBUMIN SERPL BCP-MCNC: 2.4 G/DL (ref 3.5–5.2)
ALBUMIN SERPL BCP-MCNC: 2.4 G/DL (ref 3.5–5.2)
ALP SERPL-CCNC: 54 UNIT/L (ref 40–150)
ALP SERPL-CCNC: 55 UNIT/L (ref 40–150)
ALT SERPL W/O P-5'-P-CCNC: 5 UNIT/L (ref 10–44)
ALT SERPL W/O P-5'-P-CCNC: 6 UNIT/L (ref 10–44)
ANION GAP (OHS): 8 MMOL/L (ref 8–16)
ANION GAP (OHS): 8 MMOL/L (ref 8–16)
AST SERPL-CCNC: 10 UNIT/L (ref 11–45)
AST SERPL-CCNC: 9 UNIT/L (ref 11–45)
BASOPHILS # BLD AUTO: 0.03 K/UL
BASOPHILS # BLD AUTO: 0.04 K/UL
BASOPHILS NFR BLD AUTO: 0.3 %
BASOPHILS NFR BLD AUTO: 0.5 %
BILIRUB SERPL-MCNC: 0.5 MG/DL (ref 0.1–1)
BILIRUB SERPL-MCNC: 0.5 MG/DL (ref 0.1–1)
BUN SERPL-MCNC: 12 MG/DL (ref 8–23)
BUN SERPL-MCNC: 12 MG/DL (ref 8–23)
CALCIUM SERPL-MCNC: 9.2 MG/DL (ref 8.7–10.5)
CALCIUM SERPL-MCNC: 9.3 MG/DL (ref 8.7–10.5)
CHLORIDE SERPL-SCNC: 106 MMOL/L (ref 95–110)
CHLORIDE SERPL-SCNC: 107 MMOL/L (ref 95–110)
CO2 SERPL-SCNC: 21 MMOL/L (ref 23–29)
CO2 SERPL-SCNC: 21 MMOL/L (ref 23–29)
CREAT SERPL-MCNC: 1.3 MG/DL (ref 0.5–1.4)
CREAT SERPL-MCNC: 1.3 MG/DL (ref 0.5–1.4)
ERYTHROCYTE [DISTWIDTH] IN BLOOD BY AUTOMATED COUNT: 15.4 % (ref 11.5–14.5)
ERYTHROCYTE [DISTWIDTH] IN BLOOD BY AUTOMATED COUNT: 16.4 % (ref 11.5–14.5)
GFR SERPLBLD CREATININE-BSD FMLA CKD-EPI: 47 ML/MIN/1.73/M2
GFR SERPLBLD CREATININE-BSD FMLA CKD-EPI: 47 ML/MIN/1.73/M2
GLUCOSE SERPL-MCNC: 131 MG/DL (ref 70–110)
GLUCOSE SERPL-MCNC: 133 MG/DL (ref 70–110)
HCT VFR BLD AUTO: 26.2 % (ref 37–48.5)
HCT VFR BLD AUTO: 30 % (ref 37–48.5)
HGB BLD-MCNC: 7.9 GM/DL (ref 12–16)
HGB BLD-MCNC: 8.7 GM/DL (ref 12–16)
IMM GRANULOCYTES # BLD AUTO: 0.01 K/UL (ref 0–0.04)
IMM GRANULOCYTES # BLD AUTO: 0.08 K/UL (ref 0–0.04)
IMM GRANULOCYTES NFR BLD AUTO: 0.2 % (ref 0–0.5)
IMM GRANULOCYTES NFR BLD AUTO: 0.5 % (ref 0–0.5)
INDIRECT COOMBS: NORMAL
LYMPHOCYTES # BLD AUTO: 0.88 K/UL (ref 1–4.8)
LYMPHOCYTES # BLD AUTO: 1.3 K/UL (ref 1–4.8)
MAGNESIUM SERPL-MCNC: 1.9 MG/DL (ref 1.6–2.6)
MAGNESIUM SERPL-MCNC: 1.9 MG/DL (ref 1.6–2.6)
MCH RBC QN AUTO: 26.6 PG (ref 27–31)
MCH RBC QN AUTO: 27.1 PG (ref 27–31)
MCHC RBC AUTO-ENTMCNC: 29 G/DL (ref 32–36)
MCHC RBC AUTO-ENTMCNC: 30.2 G/DL (ref 32–36)
MCV RBC AUTO: 90 FL (ref 82–98)
MCV RBC AUTO: 92 FL (ref 82–98)
NUCLEATED RBC (/100WBC) (OHS): 0 /100 WBC
NUCLEATED RBC (/100WBC) (OHS): 0 /100 WBC
PHOSPHATE SERPL-MCNC: 5.9 MG/DL (ref 2.7–4.5)
PHOSPHATE SERPL-MCNC: 5.9 MG/DL (ref 2.7–4.5)
PLATELET # BLD AUTO: 408 K/UL (ref 150–450)
PLATELET # BLD AUTO: ABNORMAL 10*3/UL
PLATELET BLD QL SMEAR: ABNORMAL
PMV BLD AUTO: 9.9 FL (ref 9.2–12.9)
PMV BLD AUTO: ABNORMAL FL
POTASSIUM SERPL-SCNC: 4.1 MMOL/L (ref 3.5–5.1)
POTASSIUM SERPL-SCNC: 4.1 MMOL/L (ref 3.5–5.1)
PROT SERPL-MCNC: 6.9 GM/DL (ref 6–8.4)
PROT SERPL-MCNC: 7 GM/DL (ref 6–8.4)
RBC # BLD AUTO: 2.91 M/UL (ref 4–5.4)
RBC # BLD AUTO: 3.27 M/UL (ref 4–5.4)
RELATIVE EOSINOPHIL (OHS): 0.2 %
RELATIVE EOSINOPHIL (OHS): 1.7 %
RELATIVE LYMPHOCYTE (OHS): 21.9 % (ref 18–48)
RELATIVE LYMPHOCYTE (OHS): 5.8 % (ref 18–48)
RELATIVE MONOCYTE (OHS): 3.7 % (ref 4–15)
RELATIVE MONOCYTE (OHS): 8.1 % (ref 4–15)
RELATIVE NEUTROPHIL (OHS): 67.6 % (ref 38–73)
RELATIVE NEUTROPHIL (OHS): 89.5 % (ref 38–73)
RH BLD: NORMAL
SODIUM SERPL-SCNC: 135 MMOL/L (ref 136–145)
SODIUM SERPL-SCNC: 136 MMOL/L (ref 136–145)
SPECIMEN OUTDATE: NORMAL
WBC # BLD AUTO: 15.05 K/UL (ref 3.9–12.7)
WBC # BLD AUTO: 5.93 K/UL (ref 3.9–12.7)

## 2025-07-21 PROCEDURE — 0DTK0ZZ RESECTION OF ASCENDING COLON, OPEN APPROACH: ICD-10-PCS | Performed by: COLON & RECTAL SURGERY

## 2025-07-21 PROCEDURE — 0UT20ZZ RESECTION OF BILATERAL OVARIES, OPEN APPROACH: ICD-10-PCS | Performed by: COLON & RECTAL SURGERY

## 2025-07-21 PROCEDURE — 63600175 PHARM REV CODE 636 W HCPCS

## 2025-07-21 PROCEDURE — 83735 ASSAY OF MAGNESIUM: CPT | Performed by: STUDENT IN AN ORGANIZED HEALTH CARE EDUCATION/TRAINING PROGRAM

## 2025-07-21 PROCEDURE — 80053 COMPREHEN METABOLIC PANEL: CPT | Performed by: STUDENT IN AN ORGANIZED HEALTH CARE EDUCATION/TRAINING PROGRAM

## 2025-07-21 PROCEDURE — 63600175 PHARM REV CODE 636 W HCPCS: Performed by: NURSE PRACTITIONER

## 2025-07-21 PROCEDURE — 0DTB0ZZ RESECTION OF ILEUM, OPEN APPROACH: ICD-10-PCS | Performed by: COLON & RECTAL SURGERY

## 2025-07-21 PROCEDURE — 0UT70ZZ RESECTION OF BILATERAL FALLOPIAN TUBES, OPEN APPROACH: ICD-10-PCS | Performed by: COLON & RECTAL SURGERY

## 2025-07-21 PROCEDURE — 20600001 HC STEP DOWN PRIVATE ROOM

## 2025-07-21 PROCEDURE — 0UT90ZL RESECTION OF UTERUS, SUPRACERVICAL, OPEN APPROACH: ICD-10-PCS | Performed by: COLON & RECTAL SURGERY

## 2025-07-21 PROCEDURE — 63600175 PHARM REV CODE 636 W HCPCS: Performed by: NURSE ANESTHETIST, CERTIFIED REGISTERED

## 2025-07-21 PROCEDURE — 25000003 PHARM REV CODE 250

## 2025-07-21 PROCEDURE — 25000003 PHARM REV CODE 250: Performed by: STUDENT IN AN ORGANIZED HEALTH CARE EDUCATION/TRAINING PROGRAM

## 2025-07-21 PROCEDURE — 71000033 HC RECOVERY, INTIAL HOUR: Performed by: COLON & RECTAL SURGERY

## 2025-07-21 PROCEDURE — 83735 ASSAY OF MAGNESIUM: CPT | Performed by: COLON & RECTAL SURGERY

## 2025-07-21 PROCEDURE — C1769 GUIDE WIRE: HCPCS | Performed by: COLON & RECTAL SURGERY

## 2025-07-21 PROCEDURE — 63600175 PHARM REV CODE 636 W HCPCS: Performed by: STUDENT IN AN ORGANIZED HEALTH CARE EDUCATION/TRAINING PROGRAM

## 2025-07-21 PROCEDURE — 0UT90ZZ RESECTION OF UTERUS, OPEN APPROACH: ICD-10-PCS | Performed by: COLON & RECTAL SURGERY

## 2025-07-21 PROCEDURE — 58180 PARTIAL HYSTERECTOMY: CPT | Mod: ,,, | Performed by: OBSTETRICS & GYNECOLOGY

## 2025-07-21 PROCEDURE — 84100 ASSAY OF PHOSPHORUS: CPT | Performed by: COLON & RECTAL SURGERY

## 2025-07-21 PROCEDURE — 71000016 HC POSTOP RECOV ADDL HR: Performed by: COLON & RECTAL SURGERY

## 2025-07-21 PROCEDURE — 36000708 HC OR TIME LEV III 1ST 15 MIN: Performed by: COLON & RECTAL SURGERY

## 2025-07-21 PROCEDURE — 0D1B0Z4 BYPASS ILEUM TO CUTANEOUS, OPEN APPROACH: ICD-10-PCS | Performed by: COLON & RECTAL SURGERY

## 2025-07-21 PROCEDURE — 86901 BLOOD TYPING SEROLOGIC RH(D): CPT | Performed by: NURSE PRACTITIONER

## 2025-07-21 PROCEDURE — 37000009 HC ANESTHESIA EA ADD 15 MINS: Performed by: COLON & RECTAL SURGERY

## 2025-07-21 PROCEDURE — 25000003 PHARM REV CODE 250: Performed by: NURSE PRACTITIONER

## 2025-07-21 PROCEDURE — 85025 COMPLETE CBC W/AUTO DIFF WBC: CPT | Performed by: ANESTHESIOLOGY

## 2025-07-21 PROCEDURE — 37000008 HC ANESTHESIA 1ST 15 MINUTES: Performed by: COLON & RECTAL SURGERY

## 2025-07-21 PROCEDURE — 85025 COMPLETE CBC W/AUTO DIFF WBC: CPT | Performed by: STUDENT IN AN ORGANIZED HEALTH CARE EDUCATION/TRAINING PROGRAM

## 2025-07-21 PROCEDURE — 0DTN0ZZ RESECTION OF SIGMOID COLON, OPEN APPROACH: ICD-10-PCS | Performed by: COLON & RECTAL SURGERY

## 2025-07-21 PROCEDURE — 27201423 OPTIME MED/SURG SUP & DEVICES STERILE SUPPLY: Performed by: COLON & RECTAL SURGERY

## 2025-07-21 PROCEDURE — 88307 TISSUE EXAM BY PATHOLOGIST: CPT | Mod: TC | Performed by: COLON & RECTAL SURGERY

## 2025-07-21 PROCEDURE — 36000709 HC OR TIME LEV III EA ADD 15 MIN: Performed by: COLON & RECTAL SURGERY

## 2025-07-21 PROCEDURE — 71000015 HC POSTOP RECOV 1ST HR: Performed by: COLON & RECTAL SURGERY

## 2025-07-21 PROCEDURE — 52005 CYSTO W/URTRL CATHJ: CPT | Mod: ,,, | Performed by: UROLOGY

## 2025-07-21 PROCEDURE — 84100 ASSAY OF PHOSPHORUS: CPT | Performed by: STUDENT IN AN ORGANIZED HEALTH CARE EDUCATION/TRAINING PROGRAM

## 2025-07-21 PROCEDURE — 0T788DZ DILATION OF BILATERAL URETERS WITH INTRALUMINAL DEVICE, VIA NATURAL OR ARTIFICIAL OPENING ENDOSCOPIC: ICD-10-PCS | Performed by: UROLOGY

## 2025-07-21 PROCEDURE — 25000003 PHARM REV CODE 250: Performed by: NURSE ANESTHETIST, CERTIFIED REGISTERED

## 2025-07-21 PROCEDURE — 80053 COMPREHEN METABOLIC PANEL: CPT | Performed by: COLON & RECTAL SURGERY

## 2025-07-21 PROCEDURE — C1758 CATHETER, URETERAL: HCPCS | Performed by: COLON & RECTAL SURGERY

## 2025-07-21 PROCEDURE — C1765 ADHESION BARRIER: HCPCS | Performed by: COLON & RECTAL SURGERY

## 2025-07-21 PROCEDURE — 0BH17EZ INSERTION OF ENDOTRACHEAL AIRWAY INTO TRACHEA, VIA NATURAL OR ARTIFICIAL OPENING: ICD-10-PCS | Performed by: COLON & RECTAL SURGERY

## 2025-07-21 DEVICE — MEMBRANE SEPRAFILM 5 X 6: Type: IMPLANTABLE DEVICE | Site: PELVIS | Status: FUNCTIONAL

## 2025-07-21 RX ORDER — ENOXAPARIN SODIUM 100 MG/ML
40 INJECTION SUBCUTANEOUS EVERY 24 HOURS
Status: DISCONTINUED | OUTPATIENT
Start: 2025-07-22 | End: 2025-07-24 | Stop reason: HOSPADM

## 2025-07-21 RX ORDER — ACETAMINOPHEN 500 MG
1000 TABLET ORAL EVERY 8 HOURS
Status: DISCONTINUED | OUTPATIENT
Start: 2025-07-22 | End: 2025-07-24 | Stop reason: HOSPADM

## 2025-07-21 RX ORDER — HEPARIN SODIUM 5000 [USP'U]/ML
5000 INJECTION, SOLUTION INTRAVENOUS; SUBCUTANEOUS ONCE
Status: COMPLETED | OUTPATIENT
Start: 2025-07-21 | End: 2025-07-21

## 2025-07-21 RX ORDER — LIDOCAINE HYDROCHLORIDE ANHYDROUS AND DEXTROSE MONOHYDRATE .8; 5 G/100ML; G/100ML
INJECTION, SOLUTION INTRAVENOUS CONTINUOUS PRN
Status: DISCONTINUED | OUTPATIENT
Start: 2025-07-21 | End: 2025-07-21

## 2025-07-21 RX ORDER — HALOPERIDOL LACTATE 5 MG/ML
0.5 INJECTION, SOLUTION INTRAMUSCULAR EVERY 10 MIN PRN
Status: DISCONTINUED | OUTPATIENT
Start: 2025-07-21 | End: 2025-07-21 | Stop reason: HOSPADM

## 2025-07-21 RX ORDER — LIDOCAINE HYDROCHLORIDE 10 MG/ML
1 INJECTION, SOLUTION EPIDURAL; INFILTRATION; INTRACAUDAL; PERINEURAL
Status: DISCONTINUED | OUTPATIENT
Start: 2025-07-21 | End: 2025-07-21

## 2025-07-21 RX ORDER — PROPOFOL 10 MG/ML
VIAL (ML) INTRAVENOUS
Status: DISCONTINUED | OUTPATIENT
Start: 2025-07-21 | End: 2025-07-21

## 2025-07-21 RX ORDER — ACETAMINOPHEN 10 MG/ML
1000 INJECTION, SOLUTION INTRAVENOUS EVERY 8 HOURS
Status: COMPLETED | OUTPATIENT
Start: 2025-07-21 | End: 2025-07-22

## 2025-07-21 RX ORDER — VECURONIUM BROMIDE 1 MG/ML
INJECTION, POWDER, LYOPHILIZED, FOR SOLUTION INTRAVENOUS
Status: DISCONTINUED | OUTPATIENT
Start: 2025-07-21 | End: 2025-07-21

## 2025-07-21 RX ORDER — SODIUM CHLORIDE 9 MG/ML
INJECTION, SOLUTION INTRAVENOUS CONTINUOUS
Status: ACTIVE | OUTPATIENT
Start: 2025-07-21 | End: 2025-07-22

## 2025-07-21 RX ORDER — NAPROXEN SODIUM 220 MG/1
81 TABLET, FILM COATED ORAL DAILY
COMMUNITY

## 2025-07-21 RX ORDER — ACETAMINOPHEN 650 MG/20.3ML
975 LIQUID ORAL
Status: COMPLETED | OUTPATIENT
Start: 2025-07-21 | End: 2025-07-21

## 2025-07-21 RX ORDER — DEXMEDETOMIDINE HYDROCHLORIDE 100 UG/ML
INJECTION, SOLUTION INTRAVENOUS
Status: DISCONTINUED | OUTPATIENT
Start: 2025-07-21 | End: 2025-07-21

## 2025-07-21 RX ORDER — MIDAZOLAM HYDROCHLORIDE 1 MG/ML
INJECTION INTRAMUSCULAR; INTRAVENOUS
Status: DISCONTINUED | OUTPATIENT
Start: 2025-07-21 | End: 2025-07-21

## 2025-07-21 RX ORDER — GABAPENTIN 300 MG/1
300 CAPSULE ORAL 3 TIMES DAILY
Status: DISCONTINUED | OUTPATIENT
Start: 2025-07-21 | End: 2025-07-21

## 2025-07-21 RX ORDER — NAPROXEN SODIUM 220 MG/1
81 TABLET, FILM COATED ORAL DAILY
Status: DISCONTINUED | OUTPATIENT
Start: 2025-07-22 | End: 2025-07-24 | Stop reason: HOSPADM

## 2025-07-21 RX ORDER — ONDANSETRON HYDROCHLORIDE 2 MG/ML
4 INJECTION, SOLUTION INTRAVENOUS EVERY 12 HOURS PRN
Status: DISCONTINUED | OUTPATIENT
Start: 2025-07-21 | End: 2025-07-23

## 2025-07-21 RX ORDER — HYDROMORPHONE HYDROCHLORIDE 1 MG/ML
0.2 INJECTION, SOLUTION INTRAMUSCULAR; INTRAVENOUS; SUBCUTANEOUS EVERY 5 MIN PRN
Status: DISCONTINUED | OUTPATIENT
Start: 2025-07-21 | End: 2025-07-21 | Stop reason: HOSPADM

## 2025-07-21 RX ORDER — LIDOCAINE HYDROCHLORIDE 10 MG/ML
1 INJECTION, SOLUTION EPIDURAL; INFILTRATION; INTRACAUDAL; PERINEURAL ONCE
Status: DISCONTINUED | OUTPATIENT
Start: 2025-07-21 | End: 2025-07-21

## 2025-07-21 RX ORDER — ONDANSETRON HYDROCHLORIDE 2 MG/ML
INJECTION, SOLUTION INTRAVENOUS
Status: DISCONTINUED | OUTPATIENT
Start: 2025-07-21 | End: 2025-07-21

## 2025-07-21 RX ORDER — SODIUM CHLORIDE 0.9 % (FLUSH) 0.9 %
10 SYRINGE (ML) INJECTION
Status: DISCONTINUED | OUTPATIENT
Start: 2025-07-21 | End: 2025-07-24 | Stop reason: HOSPADM

## 2025-07-21 RX ORDER — DEXAMETHASONE SODIUM PHOSPHATE 4 MG/ML
INJECTION, SOLUTION INTRA-ARTICULAR; INTRALESIONAL; INTRAMUSCULAR; INTRAVENOUS; SOFT TISSUE
Status: DISCONTINUED | OUTPATIENT
Start: 2025-07-21 | End: 2025-07-21

## 2025-07-21 RX ORDER — GLUCAGON 1 MG
1 KIT INJECTION
Status: DISCONTINUED | OUTPATIENT
Start: 2025-07-21 | End: 2025-07-21

## 2025-07-21 RX ORDER — FENTANYL CITRATE 50 UG/ML
INJECTION, SOLUTION INTRAMUSCULAR; INTRAVENOUS
Status: DISCONTINUED | OUTPATIENT
Start: 2025-07-21 | End: 2025-07-21

## 2025-07-21 RX ORDER — KETAMINE HCL IN 0.9 % NACL 50 MG/5 ML
SYRINGE (ML) INTRAVENOUS
Status: DISCONTINUED | OUTPATIENT
Start: 2025-07-21 | End: 2025-07-21

## 2025-07-21 RX ORDER — CEFAZOLIN SODIUM 1 G/3ML
INJECTION, POWDER, FOR SOLUTION INTRAMUSCULAR; INTRAVENOUS
Status: DISCONTINUED | OUTPATIENT
Start: 2025-07-21 | End: 2025-07-21

## 2025-07-21 RX ORDER — ROCURONIUM BROMIDE 10 MG/ML
INJECTION, SOLUTION INTRAVENOUS
Status: DISCONTINUED | OUTPATIENT
Start: 2025-07-21 | End: 2025-07-21

## 2025-07-21 RX ORDER — MUPIROCIN 20 MG/G
OINTMENT TOPICAL 2 TIMES DAILY
Status: DISPENSED | OUTPATIENT
Start: 2025-07-21 | End: 2025-07-24

## 2025-07-21 RX ORDER — TRIPROLIDINE/PSEUDOEPHEDRINE 2.5MG-60MG
600 TABLET ORAL
Status: COMPLETED | OUTPATIENT
Start: 2025-07-21 | End: 2025-07-21

## 2025-07-21 RX ORDER — ONDANSETRON HYDROCHLORIDE 2 MG/ML
4 INJECTION, SOLUTION INTRAVENOUS DAILY PRN
Status: DISCONTINUED | OUTPATIENT
Start: 2025-07-21 | End: 2025-07-21 | Stop reason: HOSPADM

## 2025-07-21 RX ORDER — OXYCODONE HYDROCHLORIDE 10 MG/1
10 TABLET ORAL EVERY 4 HOURS PRN
Status: DISCONTINUED | OUTPATIENT
Start: 2025-07-21 | End: 2025-07-24 | Stop reason: HOSPADM

## 2025-07-21 RX ORDER — OXYCODONE HYDROCHLORIDE 5 MG/1
5 TABLET ORAL EVERY 4 HOURS PRN
Status: DISCONTINUED | OUTPATIENT
Start: 2025-07-21 | End: 2025-07-24 | Stop reason: HOSPADM

## 2025-07-21 RX ORDER — CEFTRIAXONE 2 G/1
2 INJECTION, POWDER, FOR SOLUTION INTRAMUSCULAR; INTRAVENOUS
Status: COMPLETED | OUTPATIENT
Start: 2025-07-21 | End: 2025-07-21

## 2025-07-21 RX ORDER — PHENYLEPHRINE HYDROCHLORIDE 10 MG/ML
INJECTION INTRAVENOUS
Status: DISCONTINUED | OUTPATIENT
Start: 2025-07-21 | End: 2025-07-21

## 2025-07-21 RX ORDER — SODIUM CHLORIDE 9 MG/ML
INJECTION, SOLUTION INTRAVENOUS
Status: DISCONTINUED | OUTPATIENT
Start: 2025-07-21 | End: 2025-07-21

## 2025-07-21 RX ORDER — GABAPENTIN 300 MG/1
300 CAPSULE ORAL
Status: COMPLETED | OUTPATIENT
Start: 2025-07-21 | End: 2025-07-21

## 2025-07-21 RX ORDER — PIPERACILLIN SODIUM, TAZOBACTAM SODIUM 4; .5 G/20ML; G/20ML
INJECTION, POWDER, LYOPHILIZED, FOR SOLUTION INTRAVENOUS
Status: DISCONTINUED | OUTPATIENT
Start: 2025-07-21 | End: 2025-07-21

## 2025-07-21 RX ORDER — IBUPROFEN 400 MG/1
800 TABLET, FILM COATED ORAL EVERY 8 HOURS
Status: DISCONTINUED | OUTPATIENT
Start: 2025-07-22 | End: 2025-07-24 | Stop reason: HOSPADM

## 2025-07-21 RX ORDER — LIDOCAINE HYDROCHLORIDE 20 MG/ML
INJECTION INTRAVENOUS
Status: DISCONTINUED | OUTPATIENT
Start: 2025-07-21 | End: 2025-07-21

## 2025-07-21 RX ORDER — SODIUM CHLORIDE 0.9 % (FLUSH) 0.9 %
10 SYRINGE (ML) INJECTION
Status: DISCONTINUED | OUTPATIENT
Start: 2025-07-21 | End: 2025-07-21

## 2025-07-21 RX ORDER — MUPIROCIN 20 MG/G
1 OINTMENT TOPICAL
Status: COMPLETED | OUTPATIENT
Start: 2025-07-21 | End: 2025-07-21

## 2025-07-21 RX ORDER — SODIUM CHLORIDE 9 MG/ML
INJECTION, SOLUTION INTRAVENOUS CONTINUOUS
Status: DISCONTINUED | OUTPATIENT
Start: 2025-07-21 | End: 2025-07-21

## 2025-07-21 RX ADMIN — ACETAMINOPHEN 976.6 MG: 650 SOLUTION ORAL at 12:07

## 2025-07-21 RX ADMIN — PROPOFOL 150 MG: 10 INJECTION, EMULSION INTRAVENOUS at 01:07

## 2025-07-21 RX ADMIN — PHENYLEPHRINE HYDROCHLORIDE 100 MCG: 10 INJECTION INTRAVENOUS at 02:07

## 2025-07-21 RX ADMIN — Medication 10 MG: at 03:07

## 2025-07-21 RX ADMIN — CEFTRIAXONE SODIUM 2 G: 2 INJECTION, POWDER, FOR SOLUTION INTRAMUSCULAR; INTRAVENOUS at 01:07

## 2025-07-21 RX ADMIN — GABAPENTIN 300 MG: 300 CAPSULE ORAL at 12:07

## 2025-07-21 RX ADMIN — PHENYLEPHRINE HYDROCHLORIDE 100 MCG: 10 INJECTION INTRAVENOUS at 01:07

## 2025-07-21 RX ADMIN — ACETAMINOPHEN 1000 MG: 10 INJECTION INTRAVENOUS at 08:07

## 2025-07-21 RX ADMIN — PHENYLEPHRINE HYDROCHLORIDE 0.2 MCG/KG/MIN: 10 INJECTION INTRAVENOUS at 02:07

## 2025-07-21 RX ADMIN — PIPERACILLIN SODIUM AND TAZOBACTAM SODIUM 4.5 G: 4; .5 INJECTION, POWDER, FOR SOLUTION INTRAVENOUS at 06:07

## 2025-07-21 RX ADMIN — DEXAMETHASONE SODIUM PHOSPHATE 4 MG: 4 INJECTION, SOLUTION INTRAMUSCULAR; INTRAVENOUS at 01:07

## 2025-07-21 RX ADMIN — VECURONIUM BROMIDE 2 MG: 10 INJECTION, POWDER, LYOPHILIZED, FOR SOLUTION INTRAVENOUS at 03:07

## 2025-07-21 RX ADMIN — OXYCODONE HYDROCHLORIDE 10 MG: 10 TABLET ORAL at 11:07

## 2025-07-21 RX ADMIN — PHENYLEPHRINE HYDROCHLORIDE 200 MCG: 10 INJECTION INTRAVENOUS at 07:07

## 2025-07-21 RX ADMIN — HEPARIN SODIUM 5000 UNITS: 5000 INJECTION INTRAVENOUS; SUBCUTANEOUS at 12:07

## 2025-07-21 RX ADMIN — IBUPROFEN 800 MG: 800 INJECTION INTRAVENOUS at 10:07

## 2025-07-21 RX ADMIN — PHENYLEPHRINE HYDROCHLORIDE 200 MCG: 10 INJECTION INTRAVENOUS at 02:07

## 2025-07-21 RX ADMIN — GLYCOPYRROLATE 0.2 MG: 0.2 INJECTION, SOLUTION INTRAMUSCULAR; INTRAVENOUS at 07:07

## 2025-07-21 RX ADMIN — ROCURONIUM BROMIDE 20 MG: 10 INJECTION, SOLUTION INTRAVENOUS at 02:07

## 2025-07-21 RX ADMIN — FENTANYL CITRATE 25 MCG: 50 INJECTION, SOLUTION INTRAMUSCULAR; INTRAVENOUS at 03:07

## 2025-07-21 RX ADMIN — SODIUM CHLORIDE: 9 INJECTION, SOLUTION INTRAVENOUS at 07:07

## 2025-07-21 RX ADMIN — MUPIROCIN: 20 OINTMENT TOPICAL at 07:07

## 2025-07-21 RX ADMIN — SUGAMMADEX 200 MG: 100 INJECTION, SOLUTION INTRAVENOUS at 07:07

## 2025-07-21 RX ADMIN — FENTANYL CITRATE 100 MCG: 50 INJECTION, SOLUTION INTRAMUSCULAR; INTRAVENOUS at 01:07

## 2025-07-21 RX ADMIN — SODIUM CHLORIDE: 0.9 INJECTION, SOLUTION INTRAVENOUS at 01:07

## 2025-07-21 RX ADMIN — MIDAZOLAM HYDROCHLORIDE 2 MG: 2 INJECTION, SOLUTION INTRAMUSCULAR; INTRAVENOUS at 01:07

## 2025-07-21 RX ADMIN — ROCURONIUM BROMIDE 50 MG: 10 INJECTION, SOLUTION INTRAVENOUS at 01:07

## 2025-07-21 RX ADMIN — ROCURONIUM BROMIDE 30 MG: 10 INJECTION, SOLUTION INTRAVENOUS at 02:07

## 2025-07-21 RX ADMIN — GABAPENTIN 300 MG: 300 CAPSULE ORAL at 07:07

## 2025-07-21 RX ADMIN — Medication 15 MG: at 04:07

## 2025-07-21 RX ADMIN — OXYCODONE HYDROCHLORIDE 5 MG: 5 TABLET ORAL at 07:07

## 2025-07-21 RX ADMIN — MUPIROCIN 1 G: 20 OINTMENT TOPICAL at 12:07

## 2025-07-21 RX ADMIN — VECURONIUM BROMIDE 2 MG: 10 INJECTION, POWDER, LYOPHILIZED, FOR SOLUTION INTRAVENOUS at 04:07

## 2025-07-21 RX ADMIN — PIPERACILLIN SODIUM AND TAZOBACTAM SODIUM 4.5 G: 4; .5 INJECTION, POWDER, FOR SOLUTION INTRAVENOUS at 04:07

## 2025-07-21 RX ADMIN — LIDOCAINE HYDROCHLORIDE 100 MG: 20 INJECTION INTRAVENOUS at 01:07

## 2025-07-21 RX ADMIN — IBUPROFEN 600 MG: 100 SUSPENSION ORAL at 12:07

## 2025-07-21 RX ADMIN — LIDOCAINE HYDROCHLORIDE 0.03 MG/KG/MIN: 8 INJECTION, SOLUTION INTRAVENOUS at 01:07

## 2025-07-21 RX ADMIN — Medication 25 MG: at 02:07

## 2025-07-21 RX ADMIN — PIPERACILLIN SODIUM AND TAZOBACTAM SODIUM 4.5 G: 4; .5 INJECTION, POWDER, FOR SOLUTION INTRAVENOUS at 02:07

## 2025-07-21 RX ADMIN — FENTANYL CITRATE 25 MCG: 50 INJECTION, SOLUTION INTRAMUSCULAR; INTRAVENOUS at 05:07

## 2025-07-21 RX ADMIN — DEXMEDETOMIDINE 8 MCG: 100 INJECTION, SOLUTION, CONCENTRATE INTRAVENOUS at 02:07

## 2025-07-21 RX ADMIN — FENTANYL CITRATE 25 MCG: 50 INJECTION, SOLUTION INTRAMUSCULAR; INTRAVENOUS at 02:07

## 2025-07-21 RX ADMIN — ONDANSETRON 4 MG: 2 INJECTION INTRAMUSCULAR; INTRAVENOUS at 07:07

## 2025-07-21 NOTE — ANESTHESIA PREPROCEDURE EVALUATION
07/21/2025  Lorraine Robb is a 60 y.o., female.  60 y.o. 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  Longstanding pelvic sepsis associated with significant hypoalbuminemia.  We initially planned for resection as well as fecal diversion due to persistent hypoalbuminemia.  She then developed a DVT and was treated with 3 months of anticoagulation.  During that time, she has recovered well.  Her energy level has returned to normal.  She is active.  Her weight has been stable.        Pre-op Assessment    I have reviewed the Patient Summary Reports.     I have reviewed the Nursing Notes. I have reviewed the NPO Status.      Review of Systems  Anesthesia Hx:             Denies Family Hx of Anesthesia complications.    Denies Personal Hx of Anesthesia complications.                    Hematology/Oncology:    Oncology Normal    -- Anemia:                                  EENT/Dental:  EENT/Dental Normal           Cardiovascular:     Hypertension       CHF                                   Pulmonary:  Pulmonary Normal                       Renal/:  Chronic Renal Disease                Hepatic/GI:      Liver Disease,  Recto uterine fistula.             Musculoskeletal:  Musculoskeletal Normal                Neurological:  Neurology Normal                                      Endocrine:  Endocrine Normal            Dermatological:  Skin Normal        Physical Exam  General: Well nourished and Cooperative    Chest/Lungs:  Clear to auscultation, Normal Respiratory Rate    Heart:  Rate: Normal        Anesthesia Plan  Type of Anesthesia, risks & benefits discussed:    Anesthesia Type: Gen ETT  Intra-op Monitoring Plan: Standard ASA Monitors  Induction:  IV  Airway Plan: Direct  Informed Consent: Informed consent signed with the Patient and all parties understand the risks and  agree with anesthesia plan.  All questions answered.   ASA Score: 3  Day of Surgery Review of History & Physical: H&P Update referred to the surgeon/provider.    Ready For Surgery From Anesthesia Perspective.     .

## 2025-07-21 NOTE — OP NOTE
[facility]  Operative Note    Date: 07/21/2025    Pre-Op Diagnosis: colovesicouterine fistula  Problem List[1]      Post-Op Diagnosis: same    Procedure(s) Performed:   1.  Cystoscopy with bilateral ureteral catheter placement    Specimen(s): none    Staff Surgeon: Aries Chawla MD    Assistant Surgeon: Denis Hanna MD    Anesthesia: General endotracheal anesthesia    Indications: Lorraine Robb is a 60 y.o. female with large colovesicouterine fistula.  Dr. Stone has requested intra-operative ureteral catheters to allow for early intra-operative identification and repair of any injuries.      Findings:   Raised trigonal ridge from suspected mass/abscess  2-3cm fistula on the posterior wall. Tissue papillary surrounding fistula. Debris in bladder, but no overt feces  Ureteral orifices's displaced medially and superior on the trigone    Estimated Blood Loss: min    Drains:   1.  bilateral 5 Fr ureteral catheters  2.  16 Fr belle catheter    Procedure in Detail: Upon entering the room the patient was under general anesthesia.  The patient was then placed in the dorsal lithotomy position and prepped and draped in the usual sterile fashion. Preoperative antibiotics were administered per the primary surgeon preference.  Timeout was performed.      A 22 Fr cystoscope was inserted into the urethra and formal cystourethroscopy was performed. The urethra was normal.  The above findings noted. A 0.38 guide wire was inserted into the left  ureteral orifice and advanced to the level of the left renal pelvis. A 5 Fr ureteral catheter was then inserted over the guide wire and the wire was removed. The cystoscope was then removed leaving the ureteral catheter in place.     The cystoscope was then reinserted alongside the ureteral catheter and a 0.38 guidewire was inserted into the right ureteral orifice and advanced to the level of the right renal pelvis. A 5 Fr ureteral catheter was advanced over the  wire and the wire was removed. The cystoscope was then removed.      A 16 Fr belle catheter was inserted and the balloon was filled with 10mL of sterile water. The ureteral catheters were secured in the standard fashion. There were no complications with the procedure and the patient tolerated our procedure well.     The case was then turned over to the primary surgeon.     Denis Hanna MD     I have reviewed the operative note performed by Dr. Hanna, and I concur with her/his documentation of Lorraine Robb. I was present for the critical or key portions of the procedure.           [1]   Patient Active Problem List  Diagnosis    Sepsis due to Escherichia coli with acute renal failure and tubular necrosis without septic shock    Leukocytosis    Normocytic anemia    Thrombocytopenia    ATN (acute tubular necrosis)    Hyperbilirubinemia    Diverticulitis    Chronic heart failure with preserved ejection fraction    Elevated procalcitonin    Nephromegaly    Acute cystitis with hematuria    Elevated alkaline phosphatase level    Pneumaturia    Essential hypertension    Hypoalbuminemia    Tobacco abuse    Tobacco abuse counseling    Class 1 obesity due to excess calories without serious comorbidity with body mass index (BMI) of 30.0 to 30.9 in adult    Hepatomegaly    LARISSA (acute kidney injury)    Congestive heart failure    Sepsis    Bladder fistula    Liver mass    Hypophosphatemia

## 2025-07-21 NOTE — H&P
Lorraine Robb is 60 y.o. No obstetric history on file. presenting for scheduled FALLON BSO concurrent with colorectal procedure.    Temp:  [98.1 °F (36.7 °C)] 98.1 °F (36.7 °C)  Pulse:  [79-81] 81  Resp:  [16] 16  SpO2:  [97 %] 97 %  BP: (118)/(59) 118/59    Consents in chart. Pre-operative heparin given at 1239. All questions answered. To OR for planned procedure.    Jasmine Pires MD  Obstetrics & Gynecology, PGY-1      __________________________________________________________________    Progress Note 4/1/2025    SUBJECTIVE      Chief complaint: Surgical Consult   Referring provider: DAVE Stone MD     History of present Illness:  Lorraine Robb is a 60 y.o. G0 female presents to clinic for surgical consult. Pt has a hx of diverticular disease, she has had a slow progressing colo-uterine fistula. Colorectal has discussed  segmental resection with hysterectomy and likely need fecal diversion due to the longstanding pelvic sepsis causing increased risk of anastomotic leak. Ideally, we could perform primary anastomosis with proximal diversion. This procedure is planned for 4/21/2025.       Hx of fibroids, denies any abnormal paps.      LMP- 51, no HRT, no vb.      She past medical history significant for diverticulitis.       Denies any abdominal surgeries         Oncology History     No history exists.         Review of Systems   Constitutional:  Negative for chills and fever.   HENT:  Negative for congestion and sore throat.    Respiratory:  Negative for cough and shortness of breath.    Cardiovascular:  Negative for chest pain.   Gastrointestinal:  Negative for diarrhea, nausea and vomiting.   Genitourinary:  Negative for difficulty urinating and dysuria.   Musculoskeletal:  Negative for back pain and neck pain.   Skin:  Negative for rash and wound.   Neurological:  Negative for syncope and headaches.   All other systems reviewed and are negative.     [Past Medical History]     [Past  "Medical History]       Diagnosis Date    Diverticulitis      Hypertension       [Past Surgical History]     [Past Surgical History]  No past surgical history on file.       Review of patient's allergies indicates:   Allergen Reactions    Flagyl [metronidazole hcl] Itching and Dermatitis           Current Outpatient Medications   Medication Instructions    celecoxib (CELEBREX) 200 mg, 2 times daily    ferrous sulfate (FEOSOL) Tab tablet 1 each, Oral, See admin instructions    lisinopriL 10 mg, Daily    multivitamin Tab 1 tablet, Oral, Daily    nitrofurantoin, macrocrystal-monohydrate, (MACROBID) 100 MG capsule 100 mg, Oral, 2 times daily    oxyCODONE (ROXICODONE) 5 mg, Oral, Every 12 hours PRN      OB History   No obstetric history on file.      [Social History]     [Social History]        Tobacco Use    Smoking status: Former       Current packs/day: 0.00       Types: Cigarettes       Quit date: 2017       Years since quittin.2    Smokeless tobacco: Never   Substance Use Topics    Alcohol use: No    Drug use: Not Currently            Family History   Problem Relation Name Age of Onset    Diabetes Mother        Hypertension Mother        Cancer Sister        Diabetes Sister          The patient has no Health Maintenance topics of status Not Due       Health Maintenance Due   Topic Date Due    Cervical Cancer Screening  Never done    HIV Screening  Never done    TETANUS VACCINE  Never done    Mammogram  Never done    Pneumococcal Vaccines (Age 50+) (1 of 2 - PCV) Never done    Colorectal Cancer Screening  10/07/2022    Hemoglobin A1c (Diabetic Prevention Screening)  2023    RSV Vaccine (Age 60+ and Pregnant patients) (1 - Risk 60-74 years 1-dose series) Never done    Lipid Panel  2025      OBJECTIVE   /63 (BP Location: Left arm, Patient Position: Sitting)   Pulse 105   Temp 97.7 °F (36.5 °C) (Oral)   Resp 17   Ht 5' 5" (1.651 m)   Wt 74.8 kg (165 lb)   SpO2 97%   BMI 27.46 kg/m²    "   Physical Exam  Vitals and nursing note reviewed. Exam conducted with a chaperone present.   Constitutional:       Appearance: Normal appearance.   HENT:      Head: Normocephalic and atraumatic.   Cardiovascular:      Rate and Rhythm: Normal rate.   Pulmonary:      Effort: Pulmonary effort is normal. No respiratory distress.   Abdominal:      General: Abdomen is flat. There is no distension.      Palpations: Abdomen is soft.      Tenderness: There is no abdominal tenderness.   Musculoskeletal:         General: Normal range of motion.      Cervical back: Normal range of motion and neck supple.   Skin:     General: Skin is warm and dry.      Capillary Refill: Capillary refill takes less than 2 seconds.   Neurological:      General: No focal deficit present.      Mental Status: She is alert. Mental status is at baseline.   Psychiatric:         Mood and Affect: Mood normal.         Behavior: Behavior normal.         Thought Content: Thought content normal.         Judgment: Judgment normal.         ASSESSMENT       1. Diverticulitis     2. Colouterine fistula        PLAN      Discussed recommendation for FALLON/BSO concurrent with colorectal procedure secondary to complicated colouterine fistula and recurring sepsis. The risks, benefits, and indications of the procedure were discussed with the patient and her family members if present.  These included bleeding, transfusion, infection, damage to surrounding tissues (bowel, bladder, ureter), wound separation, lymphedema, perioperative cardiac events, VTE, pneumonia, and possible death.  We discussed possible need for bowel or urologic resection, temporary or permanent ostomies. She voiced understanding, all questions were answered and consents were signed.     Surgery 4/21/25        Zoe Mackenzie MD   Gynecologic Oncology       I spent approximately 45 minutes reviewing the available records and evaluating the patient, out of which over 50% of the time was spent face to  face with the patient in counseling and coordinating this patient's care.

## 2025-07-21 NOTE — ANESTHESIA PROCEDURE NOTES
Intubation    Date/Time: 7/21/2025 1:42 PM    Performed by: Romina Booth CRNA  Authorized by: Becca Clark MD    Intubation:     Induction:  Intravenous    Intubated:  Postinduction    Mask Ventilation:  Easy mask    Attempts:  1    Attempted By:  CRNA    Method of Intubation:  Video laryngoscopy    Blade:  Tan 3    Laryngeal View Grade: Grade I - full view of cords      Difficult Airway Encountered?: No      Complications:  None    Airway Device:  Oral endotracheal tube    Airway Device Size:  7.0    Style/Cuff Inflation:  Cuffed (inflated to minimal occlusive pressure)    Tube secured:  21    Secured at:  The lips    Placement Verified By:  Capnometry    Complicating Factors:  None    Findings Post-Intubation:  BS equal bilateral and atraumatic/condition of teeth unchanged

## 2025-07-22 PROBLEM — E87.1 HYPONATREMIA: Status: ACTIVE | Noted: 2025-07-22

## 2025-07-22 PROBLEM — R53.1 WEAKNESS: Status: ACTIVE | Noted: 2025-07-22

## 2025-07-22 PROBLEM — Z97.8 FOLEY CATHETER IN PLACE: Status: ACTIVE | Noted: 2025-07-22

## 2025-07-22 PROBLEM — D64.9 ANEMIA: Status: ACTIVE | Noted: 2025-07-22

## 2025-07-22 PROBLEM — D68.59 HYPERCOAGULOPATHY: Status: ACTIVE | Noted: 2025-07-22

## 2025-07-22 LAB
ABSOLUTE EOSINOPHIL (OHS): 0 K/UL
ABSOLUTE MONOCYTE (OHS): 0.91 K/UL (ref 0.3–1)
ABSOLUTE NEUTROPHIL COUNT (OHS): 15.5 K/UL (ref 1.8–7.7)
ANION GAP (OHS): 11 MMOL/L (ref 8–16)
BASOPHILS # BLD AUTO: 0.02 K/UL
BASOPHILS NFR BLD AUTO: 0.1 %
BUN SERPL-MCNC: 13 MG/DL (ref 8–23)
CALCIUM SERPL-MCNC: 8.6 MG/DL (ref 8.7–10.5)
CHLORIDE SERPL-SCNC: 105 MMOL/L (ref 95–110)
CO2 SERPL-SCNC: 18 MMOL/L (ref 23–29)
CREAT SERPL-MCNC: 1.3 MG/DL (ref 0.5–1.4)
ERYTHROCYTE [DISTWIDTH] IN BLOOD BY AUTOMATED COUNT: 15.4 % (ref 11.5–14.5)
GFR SERPLBLD CREATININE-BSD FMLA CKD-EPI: 47 ML/MIN/1.73/M2
GLUCOSE SERPL-MCNC: 109 MG/DL (ref 70–110)
HCT VFR BLD AUTO: 25.8 % (ref 37–48.5)
HGB BLD-MCNC: 7.5 GM/DL (ref 12–16)
IMM GRANULOCYTES # BLD AUTO: 0.05 K/UL (ref 0–0.04)
IMM GRANULOCYTES NFR BLD AUTO: 0.3 % (ref 0–0.5)
LYMPHOCYTES # BLD AUTO: 1.18 K/UL (ref 1–4.8)
MAGNESIUM SERPL-MCNC: 1.8 MG/DL (ref 1.6–2.6)
MCH RBC QN AUTO: 27.4 PG (ref 27–31)
MCHC RBC AUTO-ENTMCNC: 29.1 G/DL (ref 32–36)
MCV RBC AUTO: 94 FL (ref 82–98)
NUCLEATED RBC (/100WBC) (OHS): 0 /100 WBC
PHOSPHATE SERPL-MCNC: 5.8 MG/DL (ref 2.7–4.5)
PLATELET # BLD AUTO: 382 K/UL (ref 150–450)
PMV BLD AUTO: 9.7 FL (ref 9.2–12.9)
POTASSIUM SERPL-SCNC: 4.3 MMOL/L (ref 3.5–5.1)
RBC # BLD AUTO: 2.74 M/UL (ref 4–5.4)
RELATIVE EOSINOPHIL (OHS): 0 %
RELATIVE LYMPHOCYTE (OHS): 6.7 % (ref 18–48)
RELATIVE MONOCYTE (OHS): 5.2 % (ref 4–15)
RELATIVE NEUTROPHIL (OHS): 87.7 % (ref 38–73)
SODIUM SERPL-SCNC: 134 MMOL/L (ref 136–145)
WBC # BLD AUTO: 17.66 K/UL (ref 3.9–12.7)

## 2025-07-22 PROCEDURE — 97116 GAIT TRAINING THERAPY: CPT

## 2025-07-22 PROCEDURE — 97161 PT EVAL LOW COMPLEX 20 MIN: CPT

## 2025-07-22 PROCEDURE — 94761 N-INVAS EAR/PLS OXIMETRY MLT: CPT

## 2025-07-22 PROCEDURE — 97530 THERAPEUTIC ACTIVITIES: CPT

## 2025-07-22 PROCEDURE — 97165 OT EVAL LOW COMPLEX 30 MIN: CPT

## 2025-07-22 PROCEDURE — 83735 ASSAY OF MAGNESIUM: CPT | Performed by: STUDENT IN AN ORGANIZED HEALTH CARE EDUCATION/TRAINING PROGRAM

## 2025-07-22 PROCEDURE — 63600175 PHARM REV CODE 636 W HCPCS

## 2025-07-22 PROCEDURE — 94799 UNLISTED PULMONARY SVC/PX: CPT

## 2025-07-22 PROCEDURE — 99900035 HC TECH TIME PER 15 MIN (STAT)

## 2025-07-22 PROCEDURE — 99024 POSTOP FOLLOW-UP VISIT: CPT | Mod: ,,, | Performed by: OBSTETRICS & GYNECOLOGY

## 2025-07-22 PROCEDURE — 20600001 HC STEP DOWN PRIVATE ROOM

## 2025-07-22 PROCEDURE — 25000003 PHARM REV CODE 250: Performed by: STUDENT IN AN ORGANIZED HEALTH CARE EDUCATION/TRAINING PROGRAM

## 2025-07-22 PROCEDURE — 63600175 PHARM REV CODE 636 W HCPCS: Performed by: STUDENT IN AN ORGANIZED HEALTH CARE EDUCATION/TRAINING PROGRAM

## 2025-07-22 PROCEDURE — 36415 COLL VENOUS BLD VENIPUNCTURE: CPT | Performed by: STUDENT IN AN ORGANIZED HEALTH CARE EDUCATION/TRAINING PROGRAM

## 2025-07-22 PROCEDURE — 84100 ASSAY OF PHOSPHORUS: CPT | Performed by: STUDENT IN AN ORGANIZED HEALTH CARE EDUCATION/TRAINING PROGRAM

## 2025-07-22 PROCEDURE — 82310 ASSAY OF CALCIUM: CPT | Performed by: STUDENT IN AN ORGANIZED HEALTH CARE EDUCATION/TRAINING PROGRAM

## 2025-07-22 PROCEDURE — 85025 COMPLETE CBC W/AUTO DIFF WBC: CPT | Performed by: STUDENT IN AN ORGANIZED HEALTH CARE EDUCATION/TRAINING PROGRAM

## 2025-07-22 RX ORDER — PROCHLORPERAZINE EDISYLATE 5 MG/ML
5 INJECTION INTRAMUSCULAR; INTRAVENOUS EVERY 8 HOURS PRN
Status: DISCONTINUED | OUTPATIENT
Start: 2025-07-22 | End: 2025-07-24 | Stop reason: HOSPADM

## 2025-07-22 RX ORDER — MAGNESIUM SULFATE HEPTAHYDRATE 40 MG/ML
2 INJECTION, SOLUTION INTRAVENOUS ONCE
Status: COMPLETED | OUTPATIENT
Start: 2025-07-22 | End: 2025-07-22

## 2025-07-22 RX ADMIN — ACETAMINOPHEN 1000 MG: 500 TABLET ORAL at 09:07

## 2025-07-22 RX ADMIN — PIPERACILLIN SODIUM AND TAZOBACTAM SODIUM 4.5 G: 4; .5 INJECTION, POWDER, LYOPHILIZED, FOR SOLUTION INTRAVENOUS at 02:07

## 2025-07-22 RX ADMIN — ACETAMINOPHEN 1000 MG: 10 INJECTION INTRAVENOUS at 02:07

## 2025-07-22 RX ADMIN — ASPIRIN 81 MG CHEWABLE TABLET 81 MG: 81 TABLET CHEWABLE at 08:07

## 2025-07-22 RX ADMIN — MAGNESIUM SULFATE HEPTAHYDRATE 2 G: 40 INJECTION, SOLUTION INTRAVENOUS at 08:07

## 2025-07-22 RX ADMIN — PIPERACILLIN SODIUM AND TAZOBACTAM SODIUM 4.5 G: 4; .5 INJECTION, POWDER, LYOPHILIZED, FOR SOLUTION INTRAVENOUS at 09:07

## 2025-07-22 RX ADMIN — IBUPROFEN 800 MG: 800 INJECTION INTRAVENOUS at 03:07

## 2025-07-22 RX ADMIN — SODIUM CHLORIDE: 9 INJECTION, SOLUTION INTRAVENOUS at 08:07

## 2025-07-22 RX ADMIN — PROCHLORPERAZINE EDISYLATE 5 MG: 5 INJECTION INTRAMUSCULAR; INTRAVENOUS at 12:07

## 2025-07-22 RX ADMIN — MUPIROCIN: 20 OINTMENT TOPICAL at 09:07

## 2025-07-22 RX ADMIN — ENOXAPARIN SODIUM 40 MG: 40 INJECTION SUBCUTANEOUS at 06:07

## 2025-07-22 RX ADMIN — IBUPROFEN 800 MG: 800 INJECTION INTRAVENOUS at 05:07

## 2025-07-22 RX ADMIN — ONDANSETRON 4 MG: 2 INJECTION INTRAMUSCULAR; INTRAVENOUS at 08:07

## 2025-07-22 RX ADMIN — ACETAMINOPHEN 1000 MG: 10 INJECTION INTRAVENOUS at 04:07

## 2025-07-22 RX ADMIN — IBUPROFEN 800 MG: 400 TABLET ORAL at 09:07

## 2025-07-22 RX ADMIN — MUPIROCIN: 20 OINTMENT TOPICAL at 08:07

## 2025-07-22 RX ADMIN — PIPERACILLIN SODIUM AND TAZOBACTAM SODIUM 4.5 G: 4; .5 INJECTION, POWDER, LYOPHILIZED, FOR SOLUTION INTRAVENOUS at 06:07

## 2025-07-22 NOTE — TRANSFER OF CARE
"Anesthesia Transfer of Care Note    Patient: Lorraine Robb    Procedure(s) Performed: Procedure(s) (LRB):  COLECTOMY, SIGMOID, ERAS low (N/A)  SIGMOIDOSCOPY, FLEXIBLE (N/A)  HYSTERECTOMY, SUPRACERVICAL (Bilateral)  CYSTOSCOPY,WITH URETERAL CATHETER INSERTION (Bilateral)  SALPINGO-OOPHORECTOMY, OPEN, BILATERAL  ILEOCOLIC RESECTION (N/A)  MOBILIZATION, SPLENIC FLEXURE  WRAP-OMENTAL  CREATION, ILEOSTOMY    Patient location: PACU    Anesthesia Type: general    Transport from OR: Transported from OR on 6-10 L/min O2 by face mask with adequate spontaneous ventilation    Post pain: adequate analgesia    Post assessment: no apparent anesthetic complications    Post vital signs: stable    Level of consciousness: sedated    Nausea/Vomiting: no vomiting    Complications: none    Transfer of care protocol was followed    Last vitals: Visit Vitals  BP (!) 96/47   Pulse 63   Temp 36.2 °C (97.2 °F) (Temporal)   Resp (!) 22   Ht 5' 5" (1.651 m)   Wt 74.2 kg (163 lb 9.3 oz)   SpO2 100%   Breastfeeding No   BMI 27.22 kg/m²     "

## 2025-07-22 NOTE — ANESTHESIA POSTPROCEDURE EVALUATION
Anesthesia Post Evaluation    Patient: Lorraine Robb    Procedure(s) Performed: Procedure(s) (LRB):  COLECTOMY, SIGMOID, ERAS low (N/A)  SIGMOIDOSCOPY, FLEXIBLE (N/A)  HYSTERECTOMY, SUPRACERVICAL (Bilateral)  CYSTOSCOPY,WITH URETERAL CATHETER INSERTION (Bilateral)  SALPINGO-OOPHORECTOMY, OPEN, BILATERAL  ILEOCOLIC RESECTION (N/A)  MOBILIZATION, SPLENIC FLEXURE  WRAP-OMENTAL  CREATION, ILEOSTOMY    Final Anesthesia Type: general      Patient location during evaluation: PACU  Patient participation: Yes- Able to Participate  Level of consciousness: awake and alert  Post-procedure vital signs: reviewed and stable  Pain management: adequate  Airway patency: patent    PONV status at discharge: No PONV  Anesthetic complications: no      Cardiovascular status: blood pressure returned to baseline  Respiratory status: unassisted  Hydration status: euvolemic  Follow-up not needed.              Vitals Value Taken Time   /58 07/21/25 23:42   Temp 36.3 °C (97.4 °F) 07/21/25 23:42   Pulse 71 07/21/25 23:42   Resp 18 07/21/25 23:54   SpO2 100 % 07/21/25 23:42         Event Time   Out of Recovery 19:42:00         Pain/Raj Score: Pain Rating Prior to Med Admin: 7 (7/21/2025 11:54 PM)  Pain Rating Post Med Admin: 0 (7/21/2025  8:33 PM)  Raj Score: 9 (7/21/2025  7:42 PM)

## 2025-07-22 NOTE — HOSPITAL COURSE
60 y.o. 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  This was associated with significant malnutrition and hypoalbuminemia.  She presents for elective resection.   7/21: PROCEDURE PERFORMED:   Open sigmoid colectomy  Ileocolic resection  Mobilization of the splenic flexure  Pedicled omental wrap to the pelvis  Flexible sigmoidoscopy  Ileostomy creation  Total abdominal hysterectomy and bilateral salpingo-oophorectomy courtesy of gyn Oncology (Dr. Mackenzie)  PROCEDURE PERFORMED:  1.  Cystoscopy with bilateral ureteral catheter placement  7/22: Ambulating. +N/+V with breakfast. Pain controlled. +minimal liquid BM,+gas. Jean Baptiste. MELINA to bulb suction. Zosyn.   7/23: Ambulating. Nausea without vomiting. Pain controlled. +minimal liquid BM, +gas. Jean Baptiste. MELINA to bulb suction. Zosyn.   7/24: Ambulating, tolerating diet without nausea or vomiting, voids spontaneously, pain controlled on PO medications, having bowel movements, passing flatus, hemodynamically stable, afebrile, surgical incisions healing well without signs of infection. Comfortable with ostomy care and bag changes. MELINA drain removed and gauze/tape dressing applied.  Reviewed ileostomy output package, patient had the opportunity to ask questions, all questions and concerns were answered, and patient voiced understanding. Follow-up arranged. Discharge home.

## 2025-07-22 NOTE — HPI
60-year-old otherwise healthy female with longstanding diverticular disease who developed a colo to uterine fistula with slow progression over time now forming a colo uterine to bladder fistula presents for elective resection.   Course is as follows:  03/13/2025: She was admitted to the hospital for evaluation for pneumaturia.  CT scan with sigmoid colon to uterine to bladder fistula.  Chronically hypoalbuminemic with albumin of 2.3.  04/11/2025:  Presents for preoperative visit.  Continues to have pneumaturia but no fecal urea.  She is having 2 bowel movements per day.  No issues with fecal incontinence.  Her weight has been stable.  Continues to be active.  Nonsmoker.  No family history of colorectal cancer.  Does complain of right leg pain with standing.  7/1/2025:  Plan for colectomy was postponed secondary to acute bilateral DVTs treated with 3 months of anticoagulation.  She completed her Eliquis and has stopped 3 weeks ago.  She has been on low-dose aspirin ever since.  She is doing well.  No further leg pain.  She is active.  No abdominal pain.  No urinary symptoms or vaginal symptoms.  She is active and walks 5 miles daily.  Has returned to work. Will recheck her blood work today. If her nutrition has improved, we will be able to avoid ostomy creation. We re-reviewed sigmoid colectomy as well as hysterectomy and repair of the bladder fistula. Will plan to start with bilateral ureteral catheters. All questions answered.

## 2025-07-22 NOTE — ASSESSMENT & PLAN NOTE
60 y.o. 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  This was associated with significant malnutrition and hypoalbuminemia. She is now s/p open sig colectomy with ileostomy creation and FALLON/WYATT courtesy of Gyn[facility]Onc performed on 7/21.    -Advance to LRD - may consider clears again if n/v persists  -anti-emetics PRN - add 2nd agent  -MMPC  -Ambulate as tolerated, encourage IS use  -DVT ppx: Lovenox.   -ASA restarted  -Jean Baptiste to gravity per Urology - do NOT discontinue unless ordered to do so   -Zosyn x4 days  -Monitor labs - replace lytes PRN    Dispo: [facility]

## 2025-07-22 NOTE — PT/OT/SLP EVAL
Physical Therapy Co-Evaluation and Co-Treatment    Patient Name:  Lorraine Robb   MRN:  [id number]  Admit Date: 7/21/2025  Admitting Diagnosis:  Bladder fistula   Length of Stay: 1 days  Recent Surgery: Procedure(s) (LRB):  COLECTOMY, SIGMOID, ERAS low (N/A)  SIGMOIDOSCOPY, FLEXIBLE (N/A)  HYSTERECTOMY, SUPRACERVICAL (Bilateral)  CYSTOSCOPY,WITH URETERAL CATHETER INSERTION (Bilateral)  SALPINGO-OOPHORECTOMY, OPEN, BILATERAL  ILEOCOLIC RESECTION (N/A)  MOBILIZATION, SPLENIC FLEXURE  WRAP-OMENTAL  CREATION, ILEOSTOMY 1 Day Post-Op    Recommendations:     Discharge Recommendations: Low Intensity Therapy  Discharge Equipment Recommendations: shower chair   Barriers to discharge: increased physical assistance required    Appropriate transfer level with nursing staff: one person assist ambulation    Plan:     During this hospitalization, patient to be seen 4 x/week to address the identified rehab impairments via gait training, therapeutic activities, therapeutic exercises, neuromuscular re-education and progress towards the established goals.  Plan of Care Expires:  08/21/25  Plan of Care Reviewed with: patient, daughter    Assessment     Lorraine Robb is a 60 y.o. female admitted with a medical diagnosis of Bladder fistula POD1. Upon first attempt, pt found vomiting in bedside chair with RN at bedside. Pt educated on PT POC and requesting for return after receiving nausea medicine. Upon second attempt, pt feeling less nauseous but lethargic and eager to walk. Pt with reports of dizziness upon standing. Pt able to walk ~160 ft with CGA and instances of Min a due to scissoring gait leading to minor LOB. Pt reports dizziness improved with sitting after ambulation trial. RN present in room following gait for vital signs due to pt's lethargy. Pt currently presents below their PLOF due to recent surgery. Pt would continue to benefit from skilled PT services for further improvements in upright tolerance, gait  "quality/distance, and functional mobility to increase independence and decrease caregiver burden.     Problem List: weakness, impaired endurance, impaired balance, gait instability, impaired functional mobility, impaired self care skills.  Rehab Prognosis: Good; patient would benefit from acute skilled PT services to address these deficits and reach maximum level of function.      Goals:   Multidisciplinary Problems       Physical Therapy Goals          Problem: Physical Therapy    Goal Priority Disciplines Outcome Interventions   Physical Therapy Goal     PT, PT/OT Progressing    Description: Goals to be met by: 2025     Patient will increase functional independence with mobility by performin. Supine to sit with Supervision.  2. Sit to stand transfer with [place]  3. Gait  x 400 feet with Supervision using No Assistive Device.   4. Ascend/descend 4 stair with left Handrails Supervision using No Assistive Device.   5. Stand for 6 minutes with Supervision using No Assistive Device                         Subjective     RN  notified prior to session. Daughter present upon PT entrance into room. Patient agreeable to PT evaluation.    Chief Complaint: nausea, dizziness, lethargy  Patient/Family Comments/goals: "I love to walk."    Pain/Comfort:  Pain Rating 1: 0/10  Pain Rating Post-Intervention 1: 0/10    Social History:  Residence: Patient lives alone in a mobile home with number of outside stair(s): 4 L HR. Pt's bathroom has a tub/shower.  Equipment Owned (not using): none  Equipment Used: none  Prior level of function:  Prior to admission, patient was independent  Work:  recently left job  Drive: yes.   Managing Medicines/Managing Home: yes.   Hobbies: walking in neighborhood  Assistance Upon Discharge: family    Objective:     Additional staff present: OT; OT for co-evaluation due to suspected patient need for two skilled therapists to appropriately assess patient's functional deficits as well as " "ensure patient safety, accommodate for limited activity tolerance, and provide appropriate, skilled assistance to maximize functional potential during evaluation.     Patient found up in chair with: peripheral IV, MELINA drain, belle catheter     General Precautions: Standard, Cardiac fall   Orthopedic Precautions:N/A   Braces: N/A   Body mass index is 27.22 kg/m².  Oxygen Device: Room Air  Vitals: BP (!) 117/58 (BP Location: Right arm, Patient Position: Sitting)   Pulse 72   Temp 97.3 °F (36.3 °C)   Resp 18   Ht 5' 5" (1.651 m)   Wt 74.2 kg (163 lb 9.3 oz)   SpO2 98%   Breastfeeding No   BMI 27.22 kg/m²     Exams:  Cognition:   Oriented X 4 and Lethargic   Patient is oriented to Person, Place, Time, Situation  Command following: Follows multistep verbal commands  Fluency: clear/fluent  Hearing: Intact  Vision:  Intact  Skin Integrity: Visible skin intact  Postural Assessment: slouched posture and rounded shoulders  Physical Exam:    Left UE Left LE Right UE Right LE   Edema absent absent absent absent   ROM AROM WFL AROM WFL AROM WFL AROM WFL   Strength adequate ROM, adequate strength adequate ROM, adequate strength adequate ROM, adequate strength adequate ROM, adequate strength   Sensation intact to light touch intact to light touch intact to light touch intact to light touch   Coordination not tested not tested not tested not tested     Outcome Measures:  AM-PAC 6 CLICK MOBILITY  Turning over in bed (including adjusting bedclothes, sheets and blankets)?: 3  Sitting down on and standing up from a chair with arms (e.g., wheelchair, bedside commode, etc.): 3  Moving from lying on back to sitting on the side of the bed?: 3  Moving to and from a bed to a chair (including a wheelchair)?: 3  Need to walk in hospital room?: 3  Climbing 3-5 steps with a railing?: 3  Basic Mobility Total Score: 18     Functional Mobility:    Bed Mobility:   Pt found/returned to bedside chair    Transfers:   Sit <> Stand Transfer: stand " by assistance with no AD. [id number] from bedside chair    Standing Balance:  Static Standing Balance: Good- : able to maintain standing balance against minimal resistance  Dynamic Standing Balance: Fair : stand independently unsupported, weight shift, and reach ipsilaterally. LOB noted when crossing midline.  Assistance Level Required: Contact Guard Assistance and Minimal Assistance  Patient used: no assistive device       Gait:   Patient ambulated: ~160 ft in hallway   Patient required: contact guard assistance and minimum assistance  Patient used:  no assistive device   Gait Pattern observed: reciprocal gait  Gait Deviation(s): unsteady gait, decreased step length, narrow base of support, decreased foot clearance, decreased lorraine, and scissored gait  Impairments due to: impaired balance, decreased strength, and decreased endurance  all lines remained intact throughout ambulation trial  Chair follow for patient safety  Gait belt utilized  Comments: Patient required cues for step through gait pattern, stride length, upright posture, and width of base of support to increase independence and safety. Patient required cues ~ 50% of the time. Pt with scissoring of gait with increased fatigue requiring Min A to correct LOB. Pt with reports of dizziness.     Education:  Time provided for education, counseling and discussion of health disposition in regards to patient's current status  All questions answered within PT scope of practice and to patient's satisfaction  PT role in POC to address current functional deficits  Pt educated on proper body mechanics, safety techniques, and energy conservation with PT facilitation and cueing throughout session  Call nursing/pct to transfer to chair/use bathroom. Pt stated understanding.  Safe to perform step transfer to/from chair/bedside commode CGA and HHA w/ nursing/PCT present  Importance of OOB tolerance >4 hrs/day to improve lung ventilation and expansion as well as strengthen  postural musculature      DME Justifications:  No DME recommended requiring DME justifications    Patient left up in chair with all lines intact, call button in reach, and RN present.    History:     Past Medical History:   Diagnosis Date    Diverticulitis     Hypertension        Past Surgical History:   Procedure Laterality Date    BILATERAL SALPINGO-OOPHORECTOMY (BSO)  7/21/2025    Procedure: SALPINGO-OOPHORECTOMY, OPEN, BILATERAL;  Surgeon: Zoe Mackenzie MD;  Location: [facility] OR [facility] FLR;  Service: OB/GYN;;    COLECTOMY, SIGMOID N/A 7/21/2025    Procedure: COLECTOMY, SIGMOID, ERAS low;  Surgeon: DAVE Stone MD;  Location: [facility] OR [facility] FLR;  Service: Colon and Rectal;  Laterality: N/A;    CYSTOSCOPY,WITH URETERAL CATHETER INSERTION Bilateral 7/21/2025    Procedure: CYSTOSCOPY,WITH URETERAL CATHETER INSERTION;  Surgeon: Aries Chawla MD;  [facility];  Service: Urology;  Laterality: Bilateral;    FLEXIBLE SIGMOIDOSCOPY N/A 7/21/2025    Procedure: SIGMOIDOSCOPY, FLEXIBLE;  Surgeon: DAVE Stone MD;  [facility];  Service: Colon and Rectal;  Laterality: N/A;    ILEOCOLECTOMY N/A 7/21/2025    Procedure: ILEOCOLIC RESECTION;  Surgeon: DAVE Stone MD;  [facility]LR;  Service: Colon and Rectal;  Laterality: N/A;    ILEOSTOMY  7/21/2025    Procedure: CREATION, ILEOSTOMY;  Surgeon: DAVE Stone MD;  [facility];  Service: Colon and Rectal;;    MOBILIZATION OF SPLENIC FLEXURE  7/21/2025    Procedure: MOBILIZATION, SPLENIC FLEXURE;  Surgeon: DAVE Stone MD;  [facility];  Service: Colon and Rectal;;    PARTIAL HYSTERECTOMY Bilateral 7/21/2025    Procedure: HYSTERECTOMY, SUPRACERVICAL;  Surgeon: Zoe Mackenzie MD;  [facility];  Service: OB/GYN;  Laterality: Bilateral;    WRAP-OMENTAL  7/21/2025    Procedure: WRAP-OMENTAL;  Surgeon: DAVE Stone MD;  Location: [facility] OR [address] [place] [address];   Service: Colon and Rectal;;       Family History   Problem Relation Name Age of Onset    Diabetes Mother      Hypertension Mother      Cancer Sister      Diabetes Sister         Social History     Socioeconomic History    Marital status:    Tobacco Use    Smoking status: Former     Current packs/day: 0.00     Types: Cigarettes     Quit date: 2017     Years since quittin.5     Passive exposure: Past    Smokeless tobacco: Never   Substance and Sexual Activity    Alcohol use: No    Drug use: Not Currently    Sexual activity: Not Currently     Social Drivers of Health     Financial Resource Strain: Low Risk  (2025)    Overall Financial Resource Strain (CARDIA)     Difficulty of Paying Living Expenses: Not very hard   Food Insecurity: No Food Insecurity (2025)    Hunger Vital Sign     Worried About Running Out of Food in the Last Year: Never true     Ran Out of Food in the Last Year: Never true   Transportation Needs: No Transportation Needs (2025)    PRAPARE - Transportation     Lack of Transportation (Medical): No     Lack of Transportation (Non-Medical): No   Physical Activity: Sufficiently Active (4/10/2025)    Exercise Vital Sign     Days of Exercise per Week: 4 days     Minutes of Exercise per Session: 60 min   Recent Concern: Physical Activity - Insufficiently Active (3/14/2025)    Exercise Vital Sign     Days of Exercise per Week: 7 days     Minutes of Exercise per Session: 20 min   Stress: Patient Unable To Answer (2025)    [place] [place] [employer] - Occupational Stress Questionnaire     Feeling of Stress : Patient unable to answer   Housing Stability: Low Risk  (2025)    Housing Stability Vital Sign     Unable to Pay for Housing in the Last Year: No     Homeless in the Last Year: No       Time Tracking:     PT Received On: 25  PT Start Time: 935 (first attempt: 837)     PT Stop Time: 956 (first attempt: 844)  PT Total Time (min): 21 min + 7 min =  28 min    Billable Minutes: Evaluation 12 min and Gait Training 16 min    7/22/2025

## 2025-07-22 NOTE — PLAN OF CARE
Problem: Adult Inpatient Plan of Care  Goal: Plan of Care Review  Outcome: Progressing  Goal: Patient-Specific Goal (Individualized)  Outcome: Progressing  Goal: Absence of Hospital-Acquired Illness or Injury  Outcome: Progressing  Goal: Optimal Comfort and Wellbeing  Outcome: Progressing  Goal: Readiness for Transition of Care  Outcome: Progressing     Problem: Sepsis/Septic Shock  Goal: Optimal Coping  Outcome: Progressing  Goal: Absence of Bleeding  Outcome: Progressing  Goal: Blood Glucose Level Within Targeted Range  Outcome: Progressing  Goal: Absence of Infection Signs and Symptoms  Outcome: Progressing  Goal: Optimal Nutrition Intake  Outcome: Progressing     Problem: Acute Kidney Injury/Impairment  Goal: Fluid and Electrolyte Balance  Outcome: Progressing  Goal: Improved Oral Intake  Outcome: Progressing  Goal: Effective Renal Function  Outcome: Progressing     Problem: Infection  Goal: Absence of Infection Signs and Symptoms  Outcome: Progressing     Problem: Wound  Goal: Optimal Coping  Outcome: Progressing  Goal: Optimal Functional Ability  Outcome: Progressing  Goal: Absence of Infection Signs and Symptoms  Outcome: Progressing  Goal: Improved Oral Intake  Outcome: Progressing  Goal: Optimal Pain Control and Function  Outcome: Progressing  Goal: Skin Health and Integrity  Outcome: Progressing  Goal: Optimal Wound Healing  Outcome: Progressing

## 2025-07-22 NOTE — PLAN OF CARE
[place] Plan of Care Note  Dx diverticulitis/sp cystoscopy, creation ileostomy     Shift Events admit from pacu  belle cath sarah urine arpan drain sanguinous tolerated fluids /ice no c/o of nausea overnight.    Goals of Care: Pain management    Neuro: ALERT X4     Vital Signs: STABLE    Respiratory: RA    Diet:clear liquid     Is patient tolerating current diet? yes    GTTS:  ns @100    Urine Output/Bowel Movement: lbm 07/20/25    Drains/Tubes/Tube Feeds (include total output/shift): arpan/belle    Lines: 2 piv      Accuchecks:na    Skin: intact    Fall Risk Score: see flow sheet    Activity level? progressive    Any scheduled procedures? na    Any safety concerns? Fall precaution    Other: na

## 2025-07-22 NOTE — CARE UPDATE
I have reviewed the chart of Lorraine Robb who is hospitalized for the following:    Active Hospital Problems    Diagnosis    *Diverticulitis    Hypercoagulopathy    Hyponatremia    BMI 27.0-27.9,adult    Weakness    Jean Baptiste catheter in place    Essential hypertension    Hypoalbuminemia    Congestive heart failure    Leukocytosis        [place], KANEP-C  Unit Based ELSY

## 2025-07-22 NOTE — CONSULTS
[facility]any [facility]ait[facility] - [facility]    Wound Care     Patient Name:  Lorraine Robb  MRN:  [id number]  Date: 7/22/2025  Diagnosis: Bladder fistula     History:  Past Medical History:   Diagnosis Date    Diverticulitis     Hypertension      Social History[1]  Precautions:  Allergies as of 03/14/2025 - Reviewed 03/14/2025   Allergen Reaction Noted    Flagyl [metronidazole hcl] Itching and Dermatitis 07/07/2015       [facility] Assessment Details / Treatment:    Patient seen for wound care: New Consult   Chart reviewed for this encounter.   Labs:   WBC (K/uL)   Date Value   07/22/2025 17.66 (H)   07/21/2025 15.05 (H)     Glucose (mg/dL)   Date Value   07/22/2025 109   07/21/2025 131 (H)   03/15/2025 86   03/14/2025 110     Albumin (g/dL)   Date Value   07/21/2025 2.4 (L)   07/21/2025 2.4 (L)   03/15/2025 2.3 (L)   03/14/2025 2.3 (L)     Clay Score: 19    Narrative:  Pt seen for WC / ostomy consultation and agreed to assessment. Pt daughter at bedside reports she is a  nurse at [employer] and is familiar with some ostomy care, asked for education, plan for 7/23 0930am.   Chart reviewed for this encounter.   See Flow Sheet for additional documentation and media.    Patient seen for new ostomy consult. Initial ostomy education begun.  Goals of education include:    Pouch emptying, sizing/cuting pouch, and applying pouch  Stoma and pierre-stomal care  Food choices and hydration  Obtaining supplies    Discussed and demonstrated cutting ostomy pouch and emptying pouch of stool and gas.  Patient returned demonstration without difficulty.  Discussed meal planning with particular foods to avoid.  Discussed importance of hydration and increasing fluid intake.  Explained process of ordering supplies to be delivered to patients home.   Discussed process of ordering supplies to be delivered to patients home and placed order with [employer], [place], and [employer].  Provided reading materials regarding todays training and will follow up with  patient and daughter tomorrow.   Supplies left at bedside.    RECOMMENDATIONS:  Bedside nurse assess for acute changes (purulence, increased redness/swelling, increased drainage, malodor, increased pain, pallor, necrosis) please contact physician on any acute changes.    Discussed POC with patient and primary nurse.   See EMR for orders & patient education.    Bedside nursing to continue care & monitoring.  Bedside nursing to maintain pressure injury prevention interventions    Thank you for the consult. Wound Care will continue to follow.   25 1130   WOCN Assessment   WOCN Total Time (mins) 30   Visit Date 25   Visit Time 1130   Consult Type New   WOCN Speciality Ostomy   WOCN List ileostomy   Wound surgical   Ostomy Type Ileostomy   Intervention chart review;assessed;orders   Teaching on-going        Ileostomy 25 RLQ   Placement Date/Time: 25   Present Prior to Hospital Arrival?: No  Location: RLQ   Wound Image    Stoma Appearance round;rosebud appearance;moist   Appliance 1-piece;no leakage   Stoma Function stool;flatus;thin liquid;brown   Peristomal Assessment CLINT          [1]   Social History  Socioeconomic History    Marital status:    Tobacco Use    Smoking status: Former     Current packs/day: 0.00     Types: Cigarettes     Quit date: 2017     Years since quittin.5     Passive exposure: Past    Smokeless tobacco: Never   Substance and Sexual Activity    Alcohol use: No    Drug use: Not Currently    Sexual activity: Not Currently     Social Drivers of Health     Financial Resource Strain: Low Risk  (2025)    Overall Financial Resource Strain (CARDIA)     Difficulty of Paying Living Expenses: Not very hard   Food Insecurity: No Food Insecurity (2025)    Hunger Vital Sign     Worried About Running Out of Food in the Last Year: Never true     Ran Out of Food in the Last Year: Never true   Transportation Needs: No Transportation Needs (2025)     PRAPARE - Transportation     Lack of Transportation (Medical): No     Lack of Transportation (Non-Medical): No   Physical Activity: Sufficiently Active (4/10/2025)    Exercise Vital Sign     Days of Exercise per Week: 4 days     Minutes of Exercise per Session: 60 min   Recent Concern: Physical Activity - Insufficiently Active (3/14/2025)    Exercise Vital Sign     Days of Exercise per Week: 7 days     Minutes of Exercise per Session: 20 min   Stress: Patient Unable To Answer (7/22/2025)    [place] [employer] - Occupational Stress Questionnaire     Feeling of Stress : Patient unable to answer   Housing Stability: Low Risk  (7/22/2025)    Housing Stability Vital Sign     Unable to Pay for Housing in the Last Year: No     Homeless in the Last Year: No

## 2025-07-22 NOTE — PLAN OF CARE
Problem: Physical Therapy  Goal: Physical Therapy Goal  Description: Goals to be met by: 2025     Patient will increase functional independence with mobility by performin. Supine to sit with Supervision.  2. Sit to stand transfer with [place]  3. Gait  x 400 feet with Supervision using No Assistive Device.   4. Ascend/descend 4 stair with left Handrails Supervision using No Assistive Device.   5. Stand for 6 minutes with Supervision using No Assistive Device    Outcome: Progressing     Evaluated and established PT POC.    Melissa Richardson, PT  2025

## 2025-07-22 NOTE — SUBJECTIVE & OBJECTIVE
Subjective:     Interval History: NAEON. AF/HDS. OOBTC this AM with c/o dizziness - appears still sleepy. +N/+V with breakfast this AM - encouraged to take small bites and go slow with lunch - will add 2nd choice anti-emetic. Pain controlled.     Post-Op Info:  Procedure(s) (LRB):  COLECTOMY, SIGMOID, ERAS low (N/A)  SIGMOIDOSCOPY, FLEXIBLE (N/A)  HYSTERECTOMY, SUPRACERVICAL (Bilateral)  CYSTOSCOPY,WITH URETERAL CATHETER INSERTION (Bilateral)  SALPINGO-OOPHORECTOMY, OPEN, BILATERAL  ILEOCOLIC RESECTION (N/A)  MOBILIZATION, SPLENIC FLEXURE  WRAP-OMENTAL  CREATION, ILEOSTOMY   1 Day Post-Op      Medications:  Continuous Infusions:   0.9% NaCl   Intravenous Continuous 100 mL/hr at 07/22/25 0835 New Bag at 07/22/25 0835     Scheduled Meds:   acetaminophen  1,000 mg Intravenous Q8H    Followed by    acetaminophen  1,000 mg Oral Q8H    aspirin  81 mg Oral Daily    enoxparin  40 mg Subcutaneous Daily    ibuprofen  800 mg Intravenous Q8H    Followed by    ibuprofen  800 mg Oral Q8H    mupirocin   Nasal BID    piperacillin-tazobactam (Zosyn) IV (PEDS and ADULTS) (extended infusion is not appropriate)  4.5 g Intravenous Q8H     PRN Meds:   acetaminophen 1,000 mg/100 mL (10 mg/mL) injection 1,000 mg    Followed by    acetaminophen tablet 1,000 mg    aspirin chewable tablet 81 mg    enoxaparin injection 40 mg    ibuprofen 800 mg in dextrose 5 % 250 mL (Vial-Mate)    Followed by    ibuprofen tablet 800 mg    mupirocin 2 % ointment    piperacillin-tazobactam (ZOSYN) 4.5 g in D5W 100 mL IVPB (MB+)        Objective:     Vital Signs (Most Recent):  Temp: 97.3 °F (36.3 °C) (07/22/25 1224)  Pulse: 72 (07/22/25 1224)  Resp: 18 (07/22/25 1224)  BP: (!) 117/58 (07/22/25 1224)  SpO2: 98 % (07/22/25 1224) Vital Signs (24h Range):  Temp:  [97.2 °F (36.2 °C)-97.9 °F (36.6 °C)] 97.3 °F (36.3 °C)  Pulse:  [61-77] 72  Resp:  [15-22] 18  SpO2:  [94 %-100 %] 98 %  BP: ()/(41-61) 117/58     Intake/Output - Last 3 Shifts         07/20  "0700 07/21 0659 07/21 0700 07/22 0659 07/22 0700 07/23 0659    IV Piggyback  1338.9     Total Intake(mL/kg)  1338.9 (18)     Urine (mL/kg/hr)  370 150 (0.4)    Drains  110 40    Stool  0     Total Output  480 190    Net  +858.9 -190           Unmeasured Stool Occurrence  0 x              Physical Exam  Vitals and nursing note reviewed.   Constitutional:       General: She is not in acute distress.  Abdominal:      General: The ostomy site is clean. There is no distension.      Palpations: Abdomen is soft.      Tenderness: There is generalized abdominal tenderness (mostly incisional).      Comments: Ostomy red, moist, healthy-appearing with small amount of liquid stool and gas in bag.  Midline incision dressing CDI.  MELINA to bulb suction with sero-sang drainage.    Genitourinary:     Comments: Jean Baptiste to gravity with clear pinkish-yellow UOP  Neurological:      Mental Status: She is easily aroused.            Significant Labs:  BMP (Last 3 Results):   Recent Labs   Lab 07/21/25 2055 07/21/25  2100 07/22/25  0433   * 131* 109    135* 134*   K 4.1 4.1 4.3    106 105   CO2 21* 21* 18*   BUN 12 12 13   CREATININE 1.3 1.3 1.3   CALCIUM 9.2 9.3 8.6*   MG 1.9 1.9 1.8     CBC (Last 3 Results):   Recent Labs   Lab 07/21/25  1230 07/21/25  1932 07/22/25  0433   WBC 5.93 15.05* 17.66*   RBC 3.27* 2.91* 2.74*   HGB 8.7* 7.9* 7.5*   HCT 30.0* 26.2* 25.8*     --  382   MCV 92 90 94   MCH 26.6* 27.1 27.4   MCHC 29.0* 30.2* 29.1*     CRP (Last 3 Results): No results for input(s): "CRP" in the last 168 hours.    Significant Diagnostics:  None  "

## 2025-07-22 NOTE — PLAN OF CARE
Problem: Adult Inpatient Plan of Care  Goal: Plan of Care Review  Outcome: Progressing  Goal: Patient-Specific Goal (Individualized)  Outcome: Progressing  Goal: Absence of Hospital-Acquired Illness or Injury  Outcome: Progressing  Goal: Optimal Comfort and Wellbeing  Outcome: Progressing  Goal: Readiness for Transition of Care  Outcome: Progressing     Problem: Sepsis/Septic Shock  Goal: Optimal Coping  Outcome: Progressing  Goal: Absence of Bleeding  Outcome: Progressing  Goal: Blood Glucose Level Within Targeted Range  Outcome: Progressing  Goal: Absence of Infection Signs and Symptoms  Outcome: Progressing  Goal: Optimal Nutrition Intake  Outcome: Progressing     Problem: Acute Kidney Injury/Impairment  Goal: Fluid and Electrolyte Balance  Outcome: Progressing  Goal: Improved Oral Intake  Outcome: Progressing  Goal: Effective Renal Function  Outcome: Progressing     Problem: Infection  Goal: Absence of Infection Signs and Symptoms  Outcome: Progressing     Problem: Wound  Goal: Optimal Coping  Outcome: Progressing  Goal: Optimal Functional Ability  Outcome: Progressing  Goal: Absence of Infection Signs and Symptoms  Outcome: Progressing  Goal: Improved Oral Intake  Outcome: Progressing  Goal: Optimal Pain Control and Function  Outcome: Progressing  Goal: Skin Health and Integrity  Outcome: Progressing  Goal: Optimal Wound Healing  Outcome: Progressing     Problem: Fall Injury Risk  Goal: Absence of Fall and Fall-Related Injury  Outcome: Progressing     Problem: Pain Acute  Goal: Optimal Pain Control and Function  Outcome: Progressing    Pt aaox4, soft BP in the morning, dizziness when up and savana, IV infiltrated and swelling on R hand, ile with minimal tan output, MELINA drain care. Jean Baptiste with pink/yellow output No acute event on this shift.

## 2025-07-22 NOTE — PLAN OF CARE
Problem: Occupational Therapy  Goal: Occupational Therapy Goal  Description: Goals to be met by: 08/05/2025    Patient will increase functional independence with ADLs by performing:    UE Dressing with Modified [place].  LE Dressing with Modified [place].  Grooming while standing at sink with Modified [place].  Toileting from toilet with Modified [place] for hygiene and clothing management.   Rolling to Right, Left with Modified [place].   Supine to sit with Modified [place].  Step transfer with Modified [place]  Toilet transfer to toilet with Modified [place].    Outcome: Progressing     OT eval complete & goals established.

## 2025-07-22 NOTE — BRIEF OP NOTE
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 Item Serial No.  Lot No. LRB No. Used Action   MEMBRANE SEPRAFILM 5 X 6 - [id number]  MEMBRANE SEPRAFILM 5 X 6  [employer] [id number]  1 Implanted       Operative Findings: Open LAR, Ileocolectomy, DLI    Estimated Blood Loss: * No values recorded between 7/21/2025  1:22 PM and 7/21/2025  7:13 PM *    Estimated Blood Loss has not been documented. EBL = 400cc.         Specimens:   Specimen (24h ago, onward)       Start     Ordered    07/21/25 1532  Specimen to Pathology Gyn Oncology  RELEASE UPON ORDERING        References:    Click here for ordering Quick Tip   Question:  Release to patient  Answer:  Immediate    07/21/25 1532                  ID Type Source Tests Collected by Time Destination   1 :  Tissue Ovary and Fallopian Tube, Left SPECIMEN TO PATHOLOGY DAVE Stone MD 7/21/2025 1532    2 : uterus Tissue Uterus SPECIMEN TO PATHOLOGY DAVE Stone MD 7/21/2025 1638    3 :  Tissue Ovary and Fallopian Tube, Right SPECIMEN TO PATHOLOGY DAVE Stone  MD All 7/21/2025 1640    4 : sigmoid colon and upper rectum Tissue Large Intestine, Colon, Sigmoid SPECIMEN TO PATHOLOGY DAVE Stone MD 7/21/2025 1641    5 : Terminal Ileum and Cecum-Perm Tissue Small Intestine, Terminal Ileum SPECIMEN TO PATHOLOGY DAVE Stone MD 7/21/2025 1815    6 : Anastomosis Donut-Perm Tissue Anastomosis SPECIMEN TO PATHOLOGY DAVE Stone MD 7/21/2025 1828        [id number]

## 2025-07-22 NOTE — NURSING TRANSFER
Nursing Transfer Note      7/21/2025   21:00 PM    Nurse giving handoff:BHAVIN Barger PACU  Nurse receiving handoff:BHAVIN Neal    Reason patient is being transferred: MADDY post op care    Transfer To: 1006 from PACU    Transfer via bed    Transfer with IV pole    Transported by Transport    Transfer Vital Signs:  Blood Pressure:107/59  Heart Rate:73  O2:100  Temperature:97.7  Respirations:15    Order for Tele Monitor? NO    Additional Lines: Jean Baptiste Catheter    Medicines sent: NS infusing    Any special needs or follow-up needed: labs     Patient belongings transferred with patient: None in PACU    Chart send with patient: Yes    Notified: daughter, sister    Patient reassessed at: 7/21/25 20:33

## 2025-07-22 NOTE — CARE UPDATE
Unit ELSY Care Support Interaction      I have reviewed the chart of Lorraine Robb who is hospitalized for Diverticulitis. The patient is currently located in the following unit: [facility]           I have seen and examined the patient and provided the following support:     Clinical support - confirmed care plan pt mildly dizzy when she ambulated vitals stable. Ella OLSEN notified.        [place], PRETTY-C  Unit Based ELSY

## 2025-07-22 NOTE — PROGRESS NOTES
Progress Note  Gynecology    Admit Date: 7/21/2025  LOS: 1    Reason for Admission:  Diverticulitis    SUBJECTIVE:     Lorraine Robb is a 60 y.o. F who is POD#1 s/p Ex lap/Supracervical Hyst/BSO/Sigmoid Colectomy/Ileocolic Resection/Omental Wrap/Ileostomy/Flex Sig/Ureteral Stents/Cysto for the treatment of Complicated Diverticulitis w/ Colouterine Fistula.  Pt doing well this morning. Pain is well controlled. She is tolerating a regular diet without N/V. Ambulating without difficulty. Voiding without difficulty via belle. Not yet stooling via ostomy.    OBJECTIVE:     Vital Signs   Temp:  [97.2 °F (36.2 °C)-98.1 °F (36.7 °C)] 97.9 °F (36.6 °C)  Pulse:  [61-81] 70  Resp:  [15-22] 17  SpO2:  [94 %-100 %] 99 %  BP: ()/(41-61) 96/52      Intake/Output Summary (Last 24 hours) at 7/22/2025 0621  Last data filed at 7/22/2025 0452  Gross per 24 hour   Intake 1338.85 ml   Output 480 ml   Net 858.85 ml     UOP: 19.5 cc/h  Ostomy: 0 cc/12h  LLQ Jason Drain: 110 cc/12h     Physical Exam:  Gen: A&Ox3, NAD  CV: normal rate  Pulm: LCTAB, normal respiratory effort  Abd: soft, non-distended, appropriately tender to palpation without rebound or guarding. VMLI w/ island dressing in place, LLQ Jason drain w/ serosanguinous output, ileostomy pink/healthy w/ sweating   Ext: no peripheral edema, TEDs/SCDs in place  : Belle in place draining clear dark yellow urine    Laboratory:  Recent Labs   Lab 07/21/25  1230 07/21/25  1932 07/22/25  0433   WBC 5.93 15.05* 17.66*   HGB 8.7* 7.9* 7.5*   HCT 30.0* 26.2* 25.8*   MCV 92 90 94     --  382       Recent Labs   Lab 07/21/25 2055 07/21/25  2100 07/22/25  0433    135* 134*   K 4.1 4.1 4.3    106 105   CO2 21* 21* 18*   BUN 12 12 13   CREATININE 1.3 1.3 1.3   * 131* 109   PROT 6.9 7.0  --    BILITOT 0.5 0.5  --    ALKPHOS 54 55  --    ALT 6* 5*  --    AST 9* 10*  --    MG 1.9 1.9 1.8   PHOS 5.9* 5.9* 5.8*       Diagnostic Results:  Path pending      ASSESSMENT/PLAN:     Active Hospital Problems    Diagnosis  POA    *Diverticulitis [K57.92]  Yes      Resolved Hospital Problems   No resolved problems to display.     Assessment: 60 y.o. F POD#1 s/p Ex Lap/Supracervical Hyst/BSO/Sigmoid Colectomy/Ileocolic Resection/Omental Wrap/Ileostomy/Flex Sig/Ureteral Stents/Cysto for the treatment of Complicated Diverticulitis w/ Colouterine Fistula.    Plan:   1. Post-op   - Routine post-op advances  - Continue PRN pain medications   - Encourage ambulation   - Encourage IS  - CBC stable at 7.5/25.8  - CMP w/ stable Cr 1.3, Na 134. Mag 1.8, Phos 5.8  - UOP slightly decreased at 19.5cc/h  - Void trial per primary team  - Jason drain w/ 110 cc/12h serosanguinous output  - Ostomy in place with sweating, no true output yet  - CLD, advance per primary team  - Antiemetics prn nausea/vomiting  - PT/OT/Ostomy consulted  - VTE ppx: SCDs + Lovenox    Anemia  - CBC stable  - Asymptomatic, but has not yet ambulated    Hx BL DVTs  - Holding home Eliquis  - VTE ppx: SCDs + Lovenox    HFpEF  - Echo 10/2021 w/ EF 55%  - Asymptomatic     Dispo: As patient meets appropriate post-op milestones, plan for discharge to home.    Jayne Reyes MD  Obstetrics & Gynecology, PGY-2

## 2025-07-22 NOTE — OP NOTE
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 Mackenzie)    ATTENDING SURGEON: DAVE Stone MD    RESIDENT/FELLOW SURGEON:  Cem Macedo MD    ANESTHESIA:  General    ESTIMATED BLOOD LOSS:  400 mL    FINDINGS:  1. Dense adhesions into the pelvis involving multiple loops of small bowel.  Terminal ileum densely adherent to the pelvic fistula.  Sigmoid colon densely adherent to the uterus which was densely adherent to the bladder.  Bilateral ureters identified and protected.  Very challenging pelvic dissection due to chronic inflammatory changes.  Supracervical hysterectomy performed courtesy of gyn Oncology.  Of rectum isolated and divided.  Side-to-end colorectal anastomosis performed.  Negative anastomotic leak test.  Large abscess cavity rind in the pelvis.  Omental flap placed into the pelvis.  Due to the contamination of the pelvis, fecal diversion was warranted with a loop ileostomy.  Due to involvement of the terminal ileum into the pelvic fistula, ileocolic resection was performed with side-to-side anastomosis.    SPECIMENS:   Sigmoid colon  Terminal ileum and right colon  Anastomotic donuts    COMPLICATIONS:  None apparent    DRAINS:  19 round Jason drain through a left lower quadrant stab incision into the pelvis.    DISPOSITION: PACU    CONDITION: good    INDICATION FOR PROCEDURE:  Lorraine Robb is a 60 y.o. 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  This was associated with significant  malnutrition and hypoalbuminemia.  She presents for elective resection.    DESCRIPTION OF PROCEDURE:   After informed consent was reviewed, the patient was taken to the operating room and transferred to the OR table.  she was placed under general anesthesia. .  Ceftriaxone and flagyl were given for preoperative antibiotics.    She is placed in the lithotomy position.  Urology performed cystoscopy with bilateral ureteral catheter placement.  Her anterior abdominal wall was prepped and draped in the usual sterile fashion.  Time-out was performed.  Lower midline laparotomy performed.  The fascia was incised sharply.  The incision was extended just above the umbilicus.  Inferiorly, attachments to the anterior abdominal wall were taken down.  An extra-large Tito wound protector was placed.  There were multiple loops of small bowel densely adherent down into the pelvis.  Sharp dissection was used to free multiple loops of small bowel out of the pelvis.  The terminal ileum was densely adherent and unable to be  from the pelvic inflammatory changes.  In order to better identify the pelvis, the terminal ileum and right colon were mobilized in a lateral to medial fashion.  The right ureter was identified and protected.  Sharp dissection was used to free the cecum and the terminal ileum out of the pelvis.  During this dissection, a 3 cm enterotomy was made in the terminal ileum as well as in the base of the cecum.  This was inherent to the procedure secondary to the difficulty with the inflammatory changes of the pelvis.  The enterotomy was oversewn temporarily and would later be resected.  With the small bowel packed out of the pelvis, the sigmoid colon was mobilized in lateral to medial fashion.  Sigmoid colon was elevated and was divided with a MAEGAN 75 mm stapler with a blue load.  The mesentery was divided with a ligature.  The sacral promontory was identified in the retrorectal plane was entered.  There was no  contamination of the retrorectal plane which allowed for easy dissection distally.  The left ureter was identified.  Anteriorly, there was complete obliteration of the normal anatomy with no plane between the sigmoid colon and the uterus.  Gyn Oncology assisted with dissection of the uterus off of the bladder.  There were multiple uterine fibroids which also made dissection very difficult.  We are able to dissect down to the level of the cervix.  The bladder was densely adherent to the cervix.  Therefore, supracervical hysterectomy was performed.  Rectal sizers were used to identify the top of the rectum.  The top of the rectum was very stenosed.  We therefore went to the mid rectum.  The mid rectum was isolated and divided with a contour stapler with a green load.  Proximally, the descending colon was fully mobilized in the splenic flexure was fully mobilized.  Attachments to the lateral abdominal sidewall, the inferior border of the spleen, the omentum, the back wall of the stomach, the retroperitoneum, and the inferior border of the pancreas were all divided.  The left colic artery was preserved.  The marginal artery at the distal descending colon was evaluated and was pulsatile.  We set up for a side-to-end colorectal anastomosis.  A colotomy was made distal to the intended site of transection.  A 29 EEA anvil was inserted and brought out the anti mesenteric border 6 cm proximal to the intended site of transection.  The colon was then divided.  The transverse staple line was imbricated with interrupted 3-0 Vicryl sutures.  A 3-0 PDS was placed around the anvil as a pursestring.  A 29 EEA stapler was inserted through the anus and effaced the staple line of the rectum.  The spike was deployed posterior to the staple line.  The spike was secured to the anvil and the stapler was closed without entrapment of the ureters.  The stapler was fired in 2 circumferential anastomotic donuts were retrieved.  Leak testing was  performed.  A flexible endoscope was inserted through the anus and advanced up to the anastomosis.  The anastomosis was widely patent and appeared healthy without any evidence of anastomotic leak.  Anterior to the anastomosis, there were still large abscess rind from the prior fistula site in the bed of the prior uterus.  In order to fill this space, we created a pedicled omental flap off of the left gastroepiploic artery.  The omentum was taken off of the transverse colon and mobilized along the greater curvature of the stomach.  This filled the defect down in the pelvis well.  The terminal ileum and right colon were then addressed.  The right colon was mobilized in a lateral to medial fashion.  The ileocolic pedicle was divided with the ligature.  15 cm proximal to the ileocecal valve, the terminal ileum was isolated and divided with a MAEGAN 75 mm stapler with a blue load.  The mid ascending colon was also isolated and divided with a MAEGAN 75 mm stapler with a blue load.  The staple lines and bowel were aligned.  The anti mesenteric corner of each staple line was removed.  A MAEGAN 75 mm stapler was inserted and fired to form a common channel.  The common channel was inspected and found to be hemostatic.  The common enterotomy was then closed in a two-layer closure with a inner layer of 3-0 PDS and an outer layer of imbricating 3-0 Vicryl.  Due to the contamination down in the pelvis as well as the patient's hypoalbuminemia, loop ileostomy was created.  20 cm proximal to the ileocecal resection, the ileum was isolated.  A Penrose drain was placed.  In his site previously chosen, a circular incision was made in the skin.  The anterior posterior rectus fascia was incised longitudinally.  The Penrose drain was grasped in brought the ileum through the skin.  Orientation was confirmed.  A 19 round Jason drain was placed down into the pelvis.  Seprafilm was placed along the anterior abdominal wall.  Clean instruments were used  for abdominal closure.  The midline fascia was closed with a #1 running PDS with interrupted #1 Vicryl internal retention sutures.  Deep dermal 3-0 Vicryl sutures were used.  The skin was closed with a running 4-0 Vicryl in subcuticular fashion.  Lastly, the ileostomy was matured with interrupted 3-0 Vicryl Brooking the proximal limb.  The patient tolerated the procedure well.  There were no apparent complications.  All sponge, needle, and instruments counts were correct x 2.  she was then extubated and taken to PACU in stable condition.        DAVE Stone MD, FACS, FASCRS  Staff Surgeon  Colon & Rectal Surgery

## 2025-07-22 NOTE — PT/OT/SLP EVAL
Occupational Therapy   Co-Evaluation  Co-treatment performed due to patient's multiple deficits requiring two skilled therapists to appropriately and safely assess patient's strength and endurance while facilitating functional tasks in addition to accommodating for patient's activity tolerance.        Name: Lorraine Robb  MRN: [id number]  Admitting Diagnosis: Bladder fistula  Recent Surgery: Procedure(s) (LRB):  COLECTOMY, SIGMOID, ERAS low (N/A)  SIGMOIDOSCOPY, FLEXIBLE (N/A)  HYSTERECTOMY, SUPRACERVICAL (Bilateral)  CYSTOSCOPY,WITH URETERAL CATHETER INSERTION (Bilateral)  SALPINGO-OOPHORECTOMY, OPEN, BILATERAL  ILEOCOLIC RESECTION (N/A)  MOBILIZATION, SPLENIC FLEXURE  WRAP-OMENTAL  CREATION, ILEOSTOMY 1 Day Post-Op    Recommendations:     Discharge Recommendations: Low Intensity Therapy  Discharge Equipment Recommendations:  bath bench  Barriers to discharge:  None    Assessment:     Lorraine Robb is a 60 y.o. female with a medical diagnosis of Bladder fistula.  She presents with the following performance deficits affecting function: weakness, impaired endurance, impaired self care skills, impaired functional mobility, impaired balance, decreased lower extremity function, decreased upper extremity function, decreased safety awareness.      Pt agreeable to therapy and tolerated fairly. However, pt remains limited in ADLs, functional mobility, and functional transfers and is currently not performing tasks at [facility]. Pt would continue to benefit from skilled OT services to maximize functional independence with ADLs and functional mobility, reduce caregiver burden, and facilitate safe discharge in the least restrictive environment.      Rehab Prognosis: Good; patient would benefit from acute skilled OT services to address these deficits and reach maximum level of function.       Plan:     Patient to be seen 3 x/week to address the above listed problems via self-care/home management, therapeutic activities,  therapeutic exercises  Plan of Care Expires: 08/22/25  Plan of Care Reviewed with: patient, daughter    Subjective     Chief Complaint: dizziness  Patient/Family Comments/goals: regain PLOF    Occupational Profile:  Living Environment: pt lives levon in a mobile home with 4STE with L HR. Bathroom: tub.shower  Previous level of function: Independent  Roles and Routines: pt enjoys walking for exercise  Equipment Used at Home: none  Assistance upon Discharge: family/friends    Pain/Comfort:  Pain Rating 1: 0/10  Pain Rating Post-Intervention 1: 0/10    Patients cultural, spiritual, Temple conflicts given the current situation: no    Objective:     Communicated with: nurse prior to session.  Patient found up in chair with MELINA drain, belle catheter upon OT entry to room.    General Precautions: Standard, fall  Orthopedic Precautions: N/A  Braces: N/A  Respiratory Status: Room air    Occupational Performance:    Bed Mobility:    Patient completed Scooting to EOB with stand by assistance    Functional Mobility/Transfers:  Patient completed Sit <> Stand Transfer with stand by assistance  with  rolling walker     Patient completed Chair Transfer Step Transfer technique with contact guard assistance with  rolling walker    Functional Mobility: pt ambulated in room & hallway to simulate household environment & community distances to improve overall strength, coordination, activity tolerance & safety awareness required for participation/independence wit MRADLs/IADLs  Pt ambulated approximately 160 ft with CGA-> min A using  with no AD  Pt demonstrated some left<->right swaying and required to sit down at 160 ft due to increased difficulty 2/2 balance deficits    Activities of Daily Living:  Upper Body Dressing: minimum assistance to don gown over back while seated EOB    Lower Body Dressing: deferred 2/2 dizziness    Cognitive/Visual Perceptual:  Cognitive/Psychosocial Skills:     -       Oriented to: Person, Place, Time,  and Situation   -       Follows Commands/attention:Follows multistep  commands    Physical Exam:  Upper Extremity Range of Motion:     -       Right Upper Extremity: WFL  -       Left Upper Extremity: WFL  Upper Extremity Strength:    -       Right Upper Extremity: WFL  -       Left Upper Extremity: WFL   Strength:    -       Right Upper Extremity: WFL  -       Left Upper Extremity: WFL  Fine Motor Coordination:    -       Intact  -       Intact  Left hand thumb/finger opposition skills and Right hand thumb/finger opposition skills  Gross motor coordination:   WFL    AMPAC 6 Click ADL:  AMPAC Total Score: 18    Treatment & Education:  -Education on energy conservation and task modification to maximize safety and (I) during bed mobility, ADLs and mobility/transfers  -Education on importance of OOB activity to improve/maintain overall strength, activity tolerance and promote recovery  -Pt educated to call for assistance and to transfer with hospital staff only  -Provided education regarding role of OT, POC, & discharge recommendations with p verbalizing understanding.   Pt had no further questions & when asked whether there were any concerns pt reported none.     Patient left up in chair with all lines intact, call button in reach, nurse notified, and daughter present    GOALS:   Multidisciplinary Problems       Occupational Therapy Goals          Problem: Occupational Therapy    Goal Priority Disciplines Outcome Interventions   Occupational Therapy Goal     OT, PT/OT Progressing    Description: Goals to be met by: 08/05/2025    Patient will increase functional independence with ADLs by performing:    UE Dressing with Modified [place].  LE Dressing with Modified [place].  Grooming while standing at sink with Modified [place].  Toileting from toilet with Modified [place] for hygiene and clothing management.   Rolling to Right, Left with Modified [place].   Supine to sit with Modified  [place].  Step transfer with Modified [place]  Toilet transfer to toilet with Modified [place].                         DME Justifications:   Lorraine's mobility limitation cannot be sufficiently resolved by the use of a cane. Her functional mobility deficit can be sufficiently resolved with the use of a Rolling Walker. Patient's mobility limitation significantly impairs their ability to participate in one of more activities of daily living.  The use of a RW will significantly improve the patient's ability to participate in MRADLS and the patient will use it on regular basis in the home.    History:     Past Medical History:   Diagnosis Date    Diverticulitis     Hypertension          Past Surgical History:   Procedure Laterality Date    BILATERAL SALPINGO-OOPHORECTOMY (BSO)  7/21/2025    Procedure: SALPINGO-OOPHORECTOMY, OPEN, BILATERAL;  Surgeon: Zoe Mackenzie MD;  Location: [facility] OR [address] [place] [address];  Service: OB/GYN;;    COLECTOMY, SIGMOID N/A 7/21/2025    Procedure: COLECTOMY, SIGMOID, ERAS low;  Surgeon: DAVE Stone MD;  Location: [facility] OR [address] [place] [address];  Service: Colon and Rectal;  Laterality: N/A;    CYSTOSCOPY,WITH URETERAL CATHETER INSERTION Bilateral 7/21/2025    Procedure: CYSTOSCOPY,WITH URETERAL CATHETER INSERTION;  Surgeon: Aries Chawla MD;  Location: [facility] OR [address] [place] [address];  Service: Urology;  Laterality: Bilateral;    FLEXIBLE SIGMOIDOSCOPY N/A 7/21/2025    Procedure: SIGMOIDOSCOPY, FLEXIBLE;  Surgeon: DAVE Stone MD;  [facility];  Service: Colon and Rectal;  Laterality: N/A;    ILEOCOLECTOMY N/A 7/21/2025    Procedure: ILEOCOLIC RESECTION;  Surgeon: DAVE Stone MD;  [facility];  Service: Colon and Rectal;  Laterality: N/A;    ILEOSTOMY  7/21/2025    Procedure: CREATION, ILEOSTOMY;  Surgeon: DAVE Stone MD;  [facility];  Service: Colon and Rectal;;    MOBILIZATION OF SPLENIC FLEXURE  7/21/2025    Procedure:  MOBILIZATION, SPLENIC FLEXURE;  Surgeon: DAVE Stone MD;  [facility]: [facility] OR [facility];  Service: Colon and Rectal;;    PARTIAL HYSTERECTOMY Bilateral 7/21/2025    Procedure: HYSTERECTOMY, SUPRACERVICAL;  Surgeon: Zoe Mackenzie MD;  [facility];  Service: OB/GYN;  Laterality: Bilateral;    WRAP-OMENTAL  7/21/2025    Procedure: WRAP-OMENTAL;  Surgeon: DAVE Stone MD;  [facility];  Service: Colon and Rectal;;       Time Tracking:     OT Date of Treatment: 07/22/25  OT Start Time: 1st start: 0837   2nd start:  0935  OT Stop Time: 1st stop: 0844    2nd start: 0956  OT Total Time (min): 7min + 21 min = 28 mminuts    Billable Minutes:Evaluation 10  Therapeutic Activity 18    7/22/2025

## 2025-07-22 NOTE — PLAN OF CARE
[facility]  Initial Discharge Assessment       Primary Care Provider: Pardeep Kay MD    Admission Diagnosis: Bladder fistula [N32.2]  Diverticulitis [K57.92]    Admission Date: 7/21/2025  Expected Discharge Date: 7/25/2025    Transition of Care Barriers: None    Payor: MEDICAID / Plan: AETNA [facility] / Product Type: Managed Medicaid /     Extended Emergency Contact Information  Primary Emergency Contact: Holly Pires   [place]  Mobile Phone: [phone]  Relation: Daughter    Discharge Plan A: Home  Discharge Plan B: Home with family      [id number] - [place], [place] - [address]  [address]  [place] [address]  Phone: [phone] Fax: [phone]    [id number] - [place], [place] - [address] AT [facility] [address]  [address]  [place] [address]  Phone: [phone] Fax: [phone]      Initial Assessment (most recent)       Adult Discharge Assessment - 07/22/25 1356          Discharge Assessment    Assessment Type Discharge Planning Assessment     Confirmed/corrected address, phone number and insurance Yes     Confirmed Demographics Correct on Facesheet   patient asked to take off Spouse off as ER contact    Source of Information patient     Communicated MICHAEL with patient/caregiver Date not available/Unable to determine     People in Home alone     Do you expect to return to your current living situation? Yes     Do you have help at home or someone to help you manage your care at home? No     Prior to hospitilization cognitive status: Alert/Oriented     Current cognitive status: Alert/Oriented     Walking or Climbing Stairs Difficulty no     Dressing/Bathing Difficulty no     Home Accessibility stairs to enter home     Number of Stairs, Main Entrance four     Home Layout Able to live on 1st floor     Equipment Currently Used at Home none     Readmission within 30 days? No     Patient currently being followed by  outpatient case management? No     Do you currently have service(s) that help you manage your care at home? No     Do you take prescription medications? Yes     Do you have prescription coverage? Yes     Do you have any problems affording any of your prescribed medications? TBD     Is the patient taking medications as prescribed? yes     Who is going to help you get home at discharge? family     How do you get to doctors appointments? car, drives self;family or friend will provide     Are you on dialysis? No     Do you take coumadin? No     Discharge Plan A Home     Discharge Plan B Home with family     DME Needed Upon Discharge  none     Discharge Plan discussed with: Patient     Transition of Care Barriers None        Financial Resource Strain    How hard is it for you to pay for the very basics like food, housing, medical care, and heating? Not very hard        Housing Stability    In the last 12 months, was there a time when you were not able to pay the mortgage or rent on time? No     At any time in the past 12 months, were you homeless or living in a shelter (including now)? No        Transportation Needs    In the past 12 months, has lack of transportation kept you from medical appointments or from getting medications? No     In the past 12 months, has lack of transportation kept you from meetings, work, or from getting things needed for daily living? No        Food Insecurity    Within the past 12 months, you worried that your food would run out before you got the money to buy more. Never true     Within the past 12 months, the food you bought just didn't last and you didn't have money to get more. Never true        Alcohol Use    Q2: How many drinks containing alcohol do you have on a typical day when you are drinking? Patient does not drink        Utilities    In the past 12 months has the electric, gas, oil, or water company threatened to shut off services in your home? No        Health Literacy    How  often do you need to have someone help you when you read instructions, pamphlets, or other written material from your doctor or pharmacy? Rarely                   Discharge Plan A and Plan B have been determined by review of patient's clinical status, future medical and therapeutic needs, and coverage/benefits for post-acute care in coordination with multidisciplinary team members.     CM spoke with patient in Room at bedside. All information was verified on facesheet. Patient lives in a 1 story house alone, 4 steps to get inside. Patient states no assistance is needed. Patient can ambulate, drive, run errands, and complete ADL's independently. Patient does not use any DME or any in-home assistive equipment. Patient has not used Home Health previously. Patient is not on dialysis nor use Coumadin as a blood thinner. Patient takes medication as prescribed and has resources for all prescriptive needs. Patient will have help from family member upon discharge. Family will provide transportation home. All Questions and concerns addressed and whiteboard updated with CM contact information. Will continue to follow for course of hospitalization.        Mary Beth Turner RN  Case Management  [phone]

## 2025-07-22 NOTE — PROGRESS NOTES
[facility]  Colorectal Surgery  Progress Note    Patient Name: Lorraine Robb  MRN: [id number]  Admission Date: 7/21/2025  Hospital Length of Stay: 1 days  Attending Physician: DAVE Stone MD    Subjective:     Interval History: NAEON. AF/HDS. OOBTC this AM with c/o dizziness - appears still sleepy. +N/+V with breakfast this AM - encouraged to take small bites and go slow with lunch - will add 2nd choice anti-emetic. Pain controlled.     Post-Op Info:  Procedure(s) (LRB):  COLECTOMY, SIGMOID, ERAS low (N/A)  SIGMOIDOSCOPY, FLEXIBLE (N/A)  HYSTERECTOMY, SUPRACERVICAL (Bilateral)  CYSTOSCOPY,WITH URETERAL CATHETER INSERTION (Bilateral)  SALPINGO-OOPHORECTOMY, OPEN, BILATERAL  ILEOCOLIC RESECTION (N/A)  MOBILIZATION, SPLENIC FLEXURE  WRAP-OMENTAL  CREATION, ILEOSTOMY   1 Day Post-Op      Medications:  Continuous Infusions:   0.9% NaCl   Intravenous Continuous 100 mL/hr at 07/22/25 0835 New Bag at 07/22/25 0835     Scheduled Meds:   acetaminophen  1,000 mg Intravenous Q8H    Followed by    acetaminophen  1,000 mg Oral Q8H    aspirin  81 mg Oral Daily    enoxparin  40 mg Subcutaneous Daily    ibuprofen  800 mg Intravenous Q8H    Followed by    ibuprofen  800 mg Oral Q8H    mupirocin   Nasal BID    piperacillin-tazobactam (Zosyn) IV (PEDS and ADULTS) (extended infusion is not appropriate)  4.5 g Intravenous Q8H     PRN Meds:   acetaminophen 1,000 mg/100 mL (10 mg/mL) injection 1,000 mg    Followed by    acetaminophen tablet 1,000 mg    aspirin chewable tablet 81 mg    enoxaparin injection 40 mg    ibuprofen 800 mg in dextrose 5 % 250 mL (Vial-Mate)    Followed by    ibuprofen tablet 800 mg    mupirocin 2 % ointment    piperacillin-tazobactam (ZOSYN) 4.5 g in D5W 100 mL IVPB (MB+)        Objective:     Vital Signs (Most Recent):  Temp: 97.3 °F (36.3 °C) (07/22/25 1224)  Pulse: 72 (07/22/25 1224)  Resp: 18 (07/22/25 1224)  BP: (!) 117/58 (07/22/25 1224)  SpO2: 98 % (07/22/25 1224) Vital Signs (24h  "Range):  Temp:  [97.2 °F (36.2 °C)-97.9 °F (36.6 °C)] 97.3 °F (36.3 °C)  Pulse:  [61-77] 72  Resp:  [15-22] 18  SpO2:  [94 %-100 %] 98 %  BP: ()/(41-61) 117/58     Intake/Output - Last 3 Shifts         07/20 0700 07/21 0659 07/21 0700 07/22 0659 07/22 0700 07/23 0659    IV Piggyback  1338.9     Total Intake(mL/kg)  1338.9 (18)     Urine (mL/kg/hr)  370 150 (0.4)    Drains  110 40    Stool  0     Total Output  480 190    Net  +858.9 -190           Unmeasured Stool Occurrence  0 x              Physical Exam  Vitals and nursing note reviewed.   Constitutional:       General: She is not in acute distress.  Abdominal:      General: The ostomy site is clean. There is no distension.      Palpations: Abdomen is soft.      Tenderness: There is generalized abdominal tenderness (mostly incisional).      Comments: Ostomy red, moist, healthy-appearing with small amount of liquid stool and gas in bag.  Midline incision dressing CDI.  MELINA to bulb suction with sero-sang drainage.    Genitourinary:     Comments: Jean Baptiste to gravity with clear pinkish-yellow UOP  Neurological:      Mental Status: She is easily aroused.            Significant Labs:  BMP (Last 3 Results):   Recent Labs   Lab 07/21/25 2055 07/21/25  2100 07/22/25  0433   * 131* 109    135* 134*   K 4.1 4.1 4.3    106 105   CO2 21* 21* 18*   BUN 12 12 13   CREATININE 1.3 1.3 1.3   CALCIUM 9.2 9.3 8.6*   MG 1.9 1.9 1.8     CBC (Last 3 Results):   Recent Labs   Lab 07/21/25  1230 07/21/25  1932 07/22/25  0433   WBC 5.93 15.05* 17.66*   RBC 3.27* 2.91* 2.74*   HGB 8.7* 7.9* 7.5*   HCT 30.0* 26.2* 25.8*     --  382   MCV 92 90 94   MCH 26.6* 27.1 27.4   MCHC 29.0* 30.2* 29.1*     CRP (Last 3 Results): No results for input(s): "CRP" in the last 168 hours.    Significant Diagnostics:  None  Assessment/Plan:     * Bladder fistula  60 y.o. 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  This was associated " with significant malnutrition and hypoalbuminemia. She is now s/p open sig colectomy with ileostomy creation and FALLON/WYATT courtesy of Gyn-Onc performed on 7/21.    -Advance to LRD - may consider clears again if n/v persists  -anti-emetics PRN - add 2nd agent  -MMPC  -Ambulate as tolerated, encourage IS use  -DVT ppx: Lovenox.   -ASA restarted  -Jean Baptiste to gravity per Urology - do NOT discontinue unless ordered to do so   -Zosyn x4 days  -Monitor labs - replace lytes PRN    Dispo: MADDY Zavaleta, FNP-C  Colorectal Surgery  [facility]

## 2025-07-23 PROBLEM — Z93.9 HISTORY OF CREATION OF OSTOMY: Status: ACTIVE | Noted: 2025-07-23

## 2025-07-23 LAB
ANION GAP (OHS): 10 MMOL/L (ref 8–16)
BUN SERPL-MCNC: 11 MG/DL (ref 8–23)
CALCIUM SERPL-MCNC: 8.7 MG/DL (ref 8.7–10.5)
CHLORIDE SERPL-SCNC: 110 MMOL/L (ref 95–110)
CO2 SERPL-SCNC: 17 MMOL/L (ref 23–29)
CREAT SERPL-MCNC: 1.4 MG/DL (ref 0.5–1.4)
GFR SERPLBLD CREATININE-BSD FMLA CKD-EPI: 43 ML/MIN/1.73/M2
GLUCOSE SERPL-MCNC: 95 MG/DL (ref 70–110)
MAGNESIUM SERPL-MCNC: 2.2 MG/DL (ref 1.6–2.6)
POTASSIUM SERPL-SCNC: 4.1 MMOL/L (ref 3.5–5.1)
SODIUM SERPL-SCNC: 137 MMOL/L (ref 136–145)

## 2025-07-23 PROCEDURE — 94761 N-INVAS EAR/PLS OXIMETRY MLT: CPT

## 2025-07-23 PROCEDURE — 82947 ASSAY GLUCOSE BLOOD QUANT: CPT

## 2025-07-23 PROCEDURE — 25000003 PHARM REV CODE 250: Performed by: NURSE PRACTITIONER

## 2025-07-23 PROCEDURE — 20600001 HC STEP DOWN PRIVATE ROOM

## 2025-07-23 PROCEDURE — 25000003 PHARM REV CODE 250

## 2025-07-23 PROCEDURE — 63600175 PHARM REV CODE 636 W HCPCS: Performed by: STUDENT IN AN ORGANIZED HEALTH CARE EDUCATION/TRAINING PROGRAM

## 2025-07-23 PROCEDURE — 63600175 PHARM REV CODE 636 W HCPCS

## 2025-07-23 PROCEDURE — 99024 POSTOP FOLLOW-UP VISIT: CPT | Mod: ,,, | Performed by: OBSTETRICS & GYNECOLOGY

## 2025-07-23 PROCEDURE — 36415 COLL VENOUS BLD VENIPUNCTURE: CPT

## 2025-07-23 PROCEDURE — 25000003 PHARM REV CODE 250: Performed by: STUDENT IN AN ORGANIZED HEALTH CARE EDUCATION/TRAINING PROGRAM

## 2025-07-23 PROCEDURE — 83735 ASSAY OF MAGNESIUM: CPT

## 2025-07-23 RX ORDER — ONDANSETRON 8 MG/1
8 TABLET, ORALLY DISINTEGRATING ORAL EVERY 12 HOURS PRN
Status: DISCONTINUED | OUTPATIENT
Start: 2025-07-23 | End: 2025-07-23

## 2025-07-23 RX ORDER — ONDANSETRON 8 MG/1
8 TABLET, ORALLY DISINTEGRATING ORAL ONCE
Status: COMPLETED | OUTPATIENT
Start: 2025-07-23 | End: 2025-07-23

## 2025-07-23 RX ORDER — GABAPENTIN 300 MG/1
300 CAPSULE ORAL 3 TIMES DAILY
Status: DISCONTINUED | OUTPATIENT
Start: 2025-07-23 | End: 2025-07-24 | Stop reason: HOSPADM

## 2025-07-23 RX ORDER — METHOCARBAMOL 500 MG/1
500 TABLET, FILM COATED ORAL 3 TIMES DAILY
Status: DISCONTINUED | OUTPATIENT
Start: 2025-07-23 | End: 2025-07-24 | Stop reason: HOSPADM

## 2025-07-23 RX ORDER — ONDANSETRON HYDROCHLORIDE 2 MG/ML
4 INJECTION, SOLUTION INTRAVENOUS EVERY 12 HOURS PRN
Status: DISCONTINUED | OUTPATIENT
Start: 2025-07-23 | End: 2025-07-24 | Stop reason: HOSPADM

## 2025-07-23 RX ORDER — ONDANSETRON 8 MG/1
8 TABLET, ORALLY DISINTEGRATING ORAL EVERY 12 HOURS PRN
Status: DISCONTINUED | OUTPATIENT
Start: 2025-07-23 | End: 2025-07-24 | Stop reason: HOSPADM

## 2025-07-23 RX ADMIN — ACETAMINOPHEN 1000 MG: 500 TABLET ORAL at 05:07

## 2025-07-23 RX ADMIN — PROCHLORPERAZINE EDISYLATE 5 MG: 5 INJECTION INTRAMUSCULAR; INTRAVENOUS at 11:07

## 2025-07-23 RX ADMIN — ENOXAPARIN SODIUM 40 MG: 40 INJECTION SUBCUTANEOUS at 06:07

## 2025-07-23 RX ADMIN — PIPERACILLIN SODIUM AND TAZOBACTAM SODIUM 4.5 G: 4; .5 INJECTION, POWDER, LYOPHILIZED, FOR SOLUTION INTRAVENOUS at 06:07

## 2025-07-23 RX ADMIN — ONDANSETRON 4 MG: 2 INJECTION INTRAMUSCULAR; INTRAVENOUS at 05:07

## 2025-07-23 RX ADMIN — PIPERACILLIN SODIUM AND TAZOBACTAM SODIUM 4.5 G: 4; .5 INJECTION, POWDER, LYOPHILIZED, FOR SOLUTION INTRAVENOUS at 02:07

## 2025-07-23 RX ADMIN — ACETAMINOPHEN 1000 MG: 500 TABLET ORAL at 02:07

## 2025-07-23 RX ADMIN — GABAPENTIN 300 MG: 300 CAPSULE ORAL at 09:07

## 2025-07-23 RX ADMIN — IBUPROFEN 800 MG: 400 TABLET ORAL at 05:07

## 2025-07-23 RX ADMIN — MUPIROCIN: 20 OINTMENT TOPICAL at 09:07

## 2025-07-23 RX ADMIN — OXYCODONE HYDROCHLORIDE 10 MG: 10 TABLET ORAL at 12:07

## 2025-07-23 RX ADMIN — GABAPENTIN 300 MG: 300 CAPSULE ORAL at 03:07

## 2025-07-23 RX ADMIN — ACETAMINOPHEN 1000 MG: 500 TABLET ORAL at 09:07

## 2025-07-23 RX ADMIN — METHOCARBAMOL 500 MG: 500 TABLET ORAL at 02:07

## 2025-07-23 RX ADMIN — PIPERACILLIN SODIUM AND TAZOBACTAM SODIUM 4.5 G: 4; .5 INJECTION, POWDER, LYOPHILIZED, FOR SOLUTION INTRAVENOUS at 11:07

## 2025-07-23 RX ADMIN — IBUPROFEN 800 MG: 400 TABLET ORAL at 02:07

## 2025-07-23 RX ADMIN — ASPIRIN 81 MG CHEWABLE TABLET 81 MG: 81 TABLET CHEWABLE at 11:07

## 2025-07-23 RX ADMIN — METHOCARBAMOL 500 MG: 500 TABLET ORAL at 03:07

## 2025-07-23 RX ADMIN — IBUPROFEN 800 MG: 400 TABLET ORAL at 09:07

## 2025-07-23 RX ADMIN — GABAPENTIN 300 MG: 300 CAPSULE ORAL at 02:07

## 2025-07-23 RX ADMIN — METHOCARBAMOL 500 MG: 500 TABLET ORAL at 09:07

## 2025-07-23 RX ADMIN — ONDANSETRON 8 MG: 8 TABLET, ORALLY DISINTEGRATING ORAL at 09:07

## 2025-07-23 NOTE — NURSING
"[facility] Plan of Care Note    Dx:   Bladder fistula [N32.2]  Diverticulitis [K57.92]    Shift Events: Uncontrolled pain    Goals of Care: Progressing towards goals    Neuro: AOx4    Vital Signs: /63   Pulse 86   Temp 98.1 °F (36.7 °C) (Oral)   Resp 17   Ht 5' 5" (1.651 m)   Wt 74.2 kg (163 lb 9.3 oz)   SpO2 96%   Breastfeeding No   BMI 27.22 kg/m²     Respiratory: RA    Diet: Diet Low Fiber/Residue      Is patient tolerating current diet? Yes    GTTS: NS@100mL/hr    Urine Output/Bowel Movement:   I/O this shift:  In: -   Out: 755 [Urine:700; Drains:45; Stool:10]  Last Bowel Movement: 07/22/25      Drains/Tubes/Tube Feeds (include total output/shift):   I/O this shift:  In: -   Out: 755 [Urine:700; Drains:45; Stool:10]      Lines: PIV x 1      Accuchecks:N/A    Skin: Incisions    Fall Risk Score: 2    Activity level? X1 assist    Any scheduled procedures? N/a    Any safety concerns? N/a    Other: n/a   "

## 2025-07-23 NOTE — PROGRESS NOTES
Dany norma - [facility]  Colorectal Surgery  Progress Note    Patient Name: Lorraine Robb  MRN: [id number]  Admission Date: 7/21/2025  Hospital Length of Stay: 2 days  Attending Physician: DAVE Stone MD    Subjective:     Interval History: AF/HDS. Increased pain overnight - adjusted pain regimen and now better controlled. OOBTC this AM, nauseated, no vomiting - only able to tolerate a couple bites of breakfast. Ostomy with bowel sweat and minimal liquid stool and minimal gas - encouraged to ambulate and to use IS.     Post-Op Info:  Procedure(s) (LRB):  COLECTOMY, SIGMOID, ERAS low (N/A)  SIGMOIDOSCOPY, FLEXIBLE (N/A)  HYSTERECTOMY, SUPRACERVICAL (Bilateral)  CYSTOSCOPY,WITH URETERAL CATHETER INSERTION (Bilateral)  SALPINGO-OOPHORECTOMY, OPEN, BILATERAL  ILEOCOLIC RESECTION (N/A)  MOBILIZATION, SPLENIC FLEXURE  WRAP-OMENTAL  CREATION, ILEOSTOMY   2 Days Post-Op      Medications:  Continuous Infusions:  Scheduled Meds:   acetaminophen  1,000 mg Oral Q8H    aspirin  81 mg Oral Daily    enoxparin  40 mg Subcutaneous Daily    gabapentin  300 mg Oral TID    ibuprofen  800 mg Oral Q8H    methocarbamoL  500 mg Oral TID    mupirocin   Nasal BID    piperacillin-tazobactam (Zosyn) IV (PEDS and ADULTS) (extended infusion is not appropriate)  4.5 g Intravenous Q8H     PRN Meds:   acetaminophen tablet 1,000 mg    aspirin chewable tablet 81 mg    enoxaparin injection 40 mg    gabapentin capsule 300 mg    ibuprofen tablet 800 mg    methocarbamoL tablet 500 mg    mupirocin 2 % ointment    piperacillin-tazobactam (ZOSYN) 4.5 g in D5W 100 mL IVPB (MB+)        Objective:     Vital Signs (Most Recent):  Temp: 98 °F (36.7 °C) (07/23/25 0738)  Pulse: 90 (07/23/25 0738)  Resp: 18 (07/23/25 0738)  BP: (!) 142/65 (07/23/25 0738)  SpO2: (!) 91 % (07/23/25 0738) Vital Signs (24h Range):  Temp:  [97.3 °F (36.3 °C)-98.1 °F (36.7 °C)] 98 °F (36.7 °C)  Pulse:  [72-90] 90  Resp:  [16-18] 18  SpO2:  [91 %-98 %] 91 %  BP: (115-152)/(55-70)  "142/65     Intake/Output - Last 3 Shifts         07/21 0700 07/22 0659 07/22 0700 07/23 0659 07/23 0700 07/24 0659    P.O.  220     I.V. (mL/kg)  1685.3 (22.7)     IV Piggyback 1338.9 893.7     Total Intake(mL/kg) 1338.9 (18) 2799 (37.7)     Urine (mL/kg/hr) 370 150 (0.1)     Drains 110 135     Stool 0 20     Total Output 480 305     Net +858.9 +2494            Unmeasured Stool Occurrence 0 x               Physical Exam  Vitals and nursing note reviewed.   Constitutional:       General: She is not in acute distress.  Pulmonary:      Effort: Pulmonary effort is normal. No respiratory distress.   Abdominal:      General: The ostomy site is clean. There is no distension.      Palpations: Abdomen is soft.      Tenderness: There is abdominal tenderness (mild TTP, mostly incisional).      Comments: Ostomy red, moist, healthy-appearing with minimal amount of liquid stool and minimal gas, +bowel sweat in bag.  Midline incision dressing removed, steri-strips CDI, well-approximated, no s/s infection.   MELINA to bulb suction with sero-sang drainage.     Genitourinary:     Comments: Jean Baptiste with clear yellow UOP  Musculoskeletal:         General: Normal range of motion.   Skin:     General: Skin is warm and dry.   Neurological:      Mental Status: She is alert and oriented to person, place, and time.          Significant Labs:  BMP (Last 3 Results):   Recent Labs   Lab 07/21/25  2100 07/22/25  0433 07/23/25  0612   * 109 95   * 134* 137   K 4.1 4.3 4.1    105 110   CO2 21* 18* 17*   BUN 12 13 11   CREATININE 1.3 1.3 1.4   CALCIUM 9.3 8.6* 8.7   MG 1.9 1.8 2.2     CBC (Last 3 Results):   Recent Labs   Lab 07/21/25  1230 07/21/25  1932 07/22/25  0433   WBC 5.93 15.05* 17.66*   RBC 3.27* 2.91* 2.74*   HGB 8.7* 7.9* 7.5*   HCT 30.0* 26.2* 25.8*     --  382   MCV 92 90 94   MCH 26.6* 27.1 27.4   MCHC 29.0* 30.2* 29.1*     CRP (Last 3 Results): No results for input(s): "CRP" in the last 168 " hours.    Significant Diagnostics:  None  Assessment/Plan:     * Bladder fistula  60 y.o. 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  This was associated with significant malnutrition and hypoalbuminemia. She is now s/p open sig colectomy with ileostomy creation and FALLON/WYATT courtesy of Gyn-Onc performed on 7/21.    -LRD  -anti-emetics PRN   -MMPC  -Encourage ambulation as tolerated, encourage IS use  -DVT ppx: Lovenox.   -ASA restarted  -Jean Baptiste to gravity x 2 weeks - do NOT discontinue    -Zosyn x4 days  -Monitor labs - replace lytes PRN   -check CRP in AM    Dispo: PRETT[facility]  Colorectal Surgery  Dany CASTAÑEDA

## 2025-07-23 NOTE — ASSESSMENT & PLAN NOTE
60 y.o. 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  This was associated with significant malnutrition and hypoalbuminemia. She is now s/p open sig colectomy with ileostomy creation and FALLON/WYATT courtesy of Gyn-Onc performed on 7/21.    -LRD  -anti-emetics PRN   -MMPC  -Encourage ambulation as tolerated, encourage IS use  -DVT ppx: Lovenox.   -ASA restarted  -Jean Baptiste to gravity x 2 weeks - do NOT discontinue    -Zosyn x4 days  -Monitor labs - replace lytes PRN   -check CRP in AM    Dispo: MADDY

## 2025-07-23 NOTE — PLAN OF CARE
Problem: Adult Inpatient Plan of Care  Goal: Plan of Care Review  Outcome: Progressing  Goal: Patient-Specific Goal (Individualized)  Outcome: Progressing  Goal: Absence of Hospital-Acquired Illness or Injury  Outcome: Progressing  Goal: Optimal Comfort and Wellbeing  Outcome: Progressing  Goal: Readiness for Transition of Care  Outcome: Progressing     Problem: Sepsis/Septic Shock  Goal: Optimal Coping  Outcome: Progressing  Goal: Absence of Bleeding  Outcome: Progressing  Goal: Blood Glucose Level Within Targeted Range  Outcome: Progressing  Goal: Absence of Infection Signs and Symptoms  Outcome: Progressing  Goal: Optimal Nutrition Intake  Outcome: Progressing     Problem: Acute Kidney Injury/Impairment  Goal: Fluid and Electrolyte Balance  Outcome: Progressing  Goal: Improved Oral Intake  Outcome: Progressing  Goal: Effective Renal Function  Outcome: Progressing     Problem: Infection  Goal: Absence of Infection Signs and Symptoms  Outcome: Progressing     Problem: Wound  Goal: Optimal Coping  Outcome: Progressing  Goal: Optimal Functional Ability  Outcome: Progressing  Goal: Absence of Infection Signs and Symptoms  Outcome: Progressing  Goal: Improved Oral Intake  Outcome: Progressing  Goal: Optimal Pain Control and Function  Outcome: Progressing  Goal: Skin Health and Integrity  Outcome: Progressing  Goal: Optimal Wound Healing  Outcome: Progressing     Problem: Fall Injury Risk  Goal: Absence of Fall and Fall-Related Injury  Outcome: Progressing     Problem: Pain Acute  Goal: Optimal Pain Control and Function  Outcome: Progressing

## 2025-07-23 NOTE — PLAN OF CARE
Problem: Adult Inpatient Plan of Care  Goal: Plan of Care Review  Outcome: Progressing  Goal: Patient-Specific Goal (Individualized)  Outcome: Progressing  Goal: Absence of Hospital-Acquired Illness or Injury  Outcome: Progressing  Goal: Optimal Comfort and Wellbeing  Outcome: Progressing  Goal: Readiness for Transition of Care  Outcome: Progressing     Pt aaox4, nausea in the morning, relieved with prns, up in chair, ambulating in yee with sb assist, belle with cloudy yellow urine and sediment. Ostomy with 500cc output and flatus. Pt expressed willingness to empty her own ostomy. Pt was able to empty her own ostomy under RN's supervision.

## 2025-07-23 NOTE — PLAN OF CARE
07/23/25 1701   Rounds   Attendance ;Charge nurse;Provider;Assigned nurse  (Provider: Unit ELSY)   Discharge Plan A Home;Home with family   Why the patient remains in the hospital Requires continued medical care   Transition of Care Barriers None     Nausea. Awaiting return of bowel functioning.     Discharge Plan A and Plan B have been determined by review of patient's clinical status, future medical and therapeutic needs, and coverage/benefits for post-acute care in coordination with multidisciplinary team members.     Neil Mina LMSW

## 2025-07-23 NOTE — CONSULTS
[facility] VASCULAR ACCESS NOTE       Bed:1006/1006 A    ODALYS consulted for Peripheral Access.     20G x 2.5IN PIV placed in Left Upper Arm by ODALYS using Ultrasound Guidance.    Indication: PVA  Attempts: 1      Recommended Care and Maintenance:  Rotate site based on clinical indication or when catheter related complication is suspected.  Dressing Change every 7 days or if dressing is not intact, loose, moist or blood is present.  Flush and Lock every 12 hours (once per shift if not in use) and PRN before and after each use.          Lilliam Chadwick RN

## 2025-07-23 NOTE — SUBJECTIVE & OBJECTIVE
Subjective:     Interval History: AF/HDS. Increased pain overnight - adjusted pain regimen and now better controlled. OOBTC this AM, nauseated, no vomiting - only able to tolerate a couple bites of breakfast. Ostomy with bowel sweat and minimal liquid stool and minimal gas - encouraged to ambulate and to use IS.     Post-Op Info:  Procedure(s) (LRB):  COLECTOMY, SIGMOID, ERAS low (N/A)  SIGMOIDOSCOPY, FLEXIBLE (N/A)  HYSTERECTOMY, SUPRACERVICAL (Bilateral)  CYSTOSCOPY,WITH URETERAL CATHETER INSERTION (Bilateral)  SALPINGO-OOPHORECTOMY, OPEN, BILATERAL  ILEOCOLIC RESECTION (N/A)  MOBILIZATION, SPLENIC FLEXURE  WRAP-OMENTAL  CREATION, ILEOSTOMY   2 Days Post-Op      Medications:  Continuous Infusions:  Scheduled Meds:   acetaminophen  1,000 mg Oral Q8H    aspirin  81 mg Oral Daily    enoxparin  40 mg Subcutaneous Daily    gabapentin  300 mg Oral TID    ibuprofen  800 mg Oral Q8H    methocarbamoL  500 mg Oral TID    mupirocin   Nasal BID    piperacillin-tazobactam (Zosyn) IV (PEDS and ADULTS) (extended infusion is not appropriate)  4.5 g Intravenous Q8H     PRN Meds:   acetaminophen tablet 1,000 mg    aspirin chewable tablet 81 mg    enoxaparin injection 40 mg    gabapentin capsule 300 mg    ibuprofen tablet 800 mg    methocarbamoL tablet 500 mg    mupirocin 2 % ointment    piperacillin-tazobactam (ZOSYN) 4.5 g in D5W 100 mL IVPB (MB+)        Objective:     Vital Signs (Most Recent):  Temp: 98 °F (36.7 °C) (07/23/25 0738)  Pulse: 90 (07/23/25 0738)  Resp: 18 (07/23/25 0738)  BP: (!) 142/65 (07/23/25 0738)  SpO2: (!) 91 % (07/23/25 0738) Vital Signs (24h Range):  Temp:  [97.3 °F (36.3 °C)-98.1 °F (36.7 °C)] 98 °F (36.7 °C)  Pulse:  [72-90] 90  Resp:  [16-18] 18  SpO2:  [91 %-98 %] 91 %  BP: (115-152)/(55-70) 142/65     Intake/Output - Last 3 Shifts         07/21 0700 07/22 0659 07/22 0700 07/23 0659 07/23 0700 07/24 0659    P.O.  220     I.V. (mL/kg)  1685.3 (22.7)     IV Piggyback 1338.9 893.7     Total  "Intake(mL/kg) 1338.9 (18) 2799 (37.7)     Urine (mL/kg/hr) 370 150 (0.1)     Drains 110 135     Stool 0 20     Total Output 480 305     Net +858.9 +2494            Unmeasured Stool Occurrence 0 x               Physical Exam  Vitals and nursing note reviewed.   Constitutional:       General: She is not in acute distress.  Pulmonary:      Effort: Pulmonary effort is normal. No respiratory distress.   Abdominal:      General: The ostomy site is clean. There is no distension.      Palpations: Abdomen is soft.      Tenderness: There is abdominal tenderness (mild TTP, mostly incisional).      Comments: Ostomy red, moist, healthy-appearing with minimal amount of liquid stool and minimal gas, +bowel sweat in bag.  Midline incision dressing removed, steri-strips CDI, well-approximated, no s/s infection.   MELINA to bulb suction with sero-sang drainage.     Genitourinary:     Comments: Jean Baptiste with clear yellow UOP  Musculoskeletal:         General: Normal range of motion.   Skin:     General: Skin is warm and dry.   Neurological:      Mental Status: She is alert and oriented to person, place, and time.          Significant Labs:  BMP (Last 3 Results):   Recent Labs   Lab 07/21/25  2100 07/22/25  0433 07/23/25  0612   * 109 95   * 134* 137   K 4.1 4.3 4.1    105 110   CO2 21* 18* 17*   BUN 12 13 11   CREATININE 1.3 1.3 1.4   CALCIUM 9.3 8.6* 8.7   MG 1.9 1.8 2.2     CBC (Last 3 Results):   Recent Labs   Lab 07/21/25  1230 07/21/25  1932 07/22/25  0433   WBC 5.93 15.05* 17.66*   RBC 3.27* 2.91* 2.74*   HGB 8.7* 7.9* 7.5*   HCT 30.0* 26.2* 25.8*     --  382   MCV 92 90 94   MCH 26.6* 27.1 27.4   MCHC 29.0* 30.2* 29.1*     CRP (Last 3 Results): No results for input(s): "CRP" in the last 168 hours.    Significant Diagnostics:  None  "

## 2025-07-23 NOTE — CARE UPDATE
I have reviewed the chart of Lorraine Robb who is hospitalized for the following:    Active Hospital Problems    Diagnosis    *Bladder fistula    History of creation of ostomy    Hypercoagulopathy    Hyponatremia    BMI 27.0-27.9,adult    Weakness    Jean Baptiste catheter in place    Anemia    Essential hypertension    Hypoalbuminemia    Congestive heart failure    Diverticulitis    Leukocytosis        PRETTY Garner-[facility]AL[facility]  [facility] ELSY

## 2025-07-23 NOTE — PROGRESS NOTES
Dany Gaitan - [facility]    Wound Care     Patient Name:  Lorraine Robb  MRN:  [id number]  Date: 7/23/2025  Diagnosis: Bladder fistula     History:  Past Medical History:   Diagnosis Date    Diverticulitis     Hypertension      Social History[1]  Precautions:  Allergies as of 03/14/2025 - Reviewed 03/14/2025   Allergen Reaction Noted    Flagyl [metronidazole hcl] Itching and Dermatitis 07/07/2015       [facility] Assessment Details / Treatment:    Patient seen for wound care: Follow-up   Chart reviewed for this encounter.   Labs:   WBC (K/uL)   Date Value   07/22/2025 17.66 (H)   07/21/2025 15.05 (H)     Glucose (mg/dL)   Date Value   07/23/2025 95   07/22/2025 109   03/15/2025 86   03/14/2025 110     Albumin (g/dL)   Date Value   07/21/2025 2.4 (L)   07/21/2025 2.4 (L)   03/15/2025 2.3 (L)   03/14/2025 2.3 (L)     Clay Score: 20    Narrative:  Pt seen for WC / ostomy follow-up and agreed to assessment / bag change.  Chart reviewed for this encounter.   See Flow Sheet for additional documentation and media.    Pt sitting in bedside chair with daughter at bedside for lesson / bag change.   Pouch removed using adhesive remover  Stoma and peristomal area cleansed w/warm water and 4x4 gauze.  Gently patted skin dry and measured stoma, 28mm, stoma is round with some irregularity at the mucocutaneous junction.   Cavilon skin barrier applied to peristomal area, pierre-stomal skin mildly irritated.  Pouch cut to the size of the stoma.  Pt applied hand for a few minutes to ensure good seal.     Applied with no difficulties.  Will follow up with patient daily.  Nursing to continue care.    Supplies left at bedside.    Pt and daughter educated on stoma complications (color change,odor, prolapse, bleeding).  Pt agreed to receive samples kits, email sent.    RECOMMENDATIONS:  Bedside nurse assess for acute changes (purulence, increased redness/swelling, increased drainage, malodor, increased pain, pallor, necrosis) please contact physician  "on any acute changes.    Cleanse peristomal skin with warm water and gauze, avoid soaps and lotions.  Ensure stoma is measured is cut to size (measured diameter + 1/8")    Discussed POC with patient and primary nurse.   See EMR for orders & patient education.    Bedside nursing to continue care, dressing changes, & continue monitoring.  Bedside nursing to maintain pressure injury prevention interventions, (PIP).     Recommendations made to primary team for above plan.    Thank you for the consult. Wound Care will continue to follow.   25   WOCN Assessment   WOCN Total Time (mins) 45   Visit Date 25   Visit Time 930   Consult Type Follow Up   WOCN Speciality Ostomy   WOCN List ileostomy   Wound surgical   Ostomy Type Ileostomy   Procedure ostomy pouch   Intervention chart review;assessed;changed;applied   Teaching on-going        Ileostomy 25 RLQ   Placement Date/Time: 25   Present Prior to Hospital Arrival?: No  Location: RLQ   Wound Image     Stoma Appearance round;rosebud appearance;moist;pink;red;protruding above skin level   Appliance 1-piece;no leakage;changed;convexity   Treatment Bag change;Placement checked;Site care   Stoma Function flatus;stool   Peristomal Assessment Clean;Dry;Intact   Tolerance no signs/symptoms of discomfort                          [1]   Social History  Socioeconomic History    Marital status:    Tobacco Use    Smoking status: Former     Current packs/day: 0.00     Types: Cigarettes     Quit date: 2017     Years since quittin.5     Passive exposure: Past    Smokeless tobacco: Never   Substance and Sexual Activity    Alcohol use: No    Drug use: Not Currently    Sexual activity: Not Currently     Social Drivers of Health     Financial Resource Strain: Low Risk  (2025)    Overall Financial Resource Strain (CARDIA)     Difficulty of Paying Living Expenses: Not very hard   Food Insecurity: No Food Insecurity (2025)    Hunger " Vital Sign     Worried About Running Out of Food in the Last Year: Never true     Ran Out of Food in the Last Year: Never true   Transportation Needs: No Transportation Needs (7/22/2025)    PRAPARE - Transportation     Lack of Transportation (Medical): No     Lack of Transportation (Non-Medical): No   Physical Activity: Sufficiently Active (4/10/2025)    Exercise Vital Sign     Days of Exercise per Week: 4 days     Minutes of Exercise per Session: 60 min   Recent Concern: Physical Activity - Insufficiently Active (3/14/2025)    Exercise Vital Sign     Days of Exercise per Week: 7 days     Minutes of Exercise per Session: 20 min   Stress: Patient Unable To Answer (7/22/2025)    [place] [place] [employer] - Occupational Stress Questionnaire     Feeling of Stress : Patient unable to answer   Housing Stability: Low Risk  (7/22/2025)    Housing Stability Vital Sign     Unable to Pay for Housing in the Last Year: No     Homeless in the Last Year: No

## 2025-07-23 NOTE — PROGRESS NOTES
Progress Note  Gynecologic Oncology    Admit Date: 7/21/2025  LOS: 2    Reason for Admission:  Bladder fistula    SUBJECTIVE:     Lorraine Robb is a 60 y.o. F who is POD#2 s/p Ex lap/Supracervical Hyst/BSO/Sigmoid Colectomy/Ileocolic Resection/Omental Wrap/Ileostomy/Flex Sig/Ureteral Stents/Cysto for the treatment of Complicated Diverticulitis w/ Colouterine Fistula.  Pt doing well this morning. Pain is well controlled. She is tolerating a low residue diet without N/V. Worked with PT OT, ambulating without difficulty. Voiding without difficulty via belle. Not passing flatus, stool output in the ostomy 10cc overnight. Only complaint is feeling bloated/gasy - encouraged ambulation today.    OBJECTIVE:     Vital Signs   Temp:  [97.3 °F (36.3 °C)-98.1 °F (36.7 °C)] 98.1 °F (36.7 °C)  Pulse:  [72-90] 86  Resp:  [16-18] 17  SpO2:  [95 %-100 %] 96 %  BP: (103-152)/(51-70) 134/63      Intake/Output Summary (Last 24 hours) at 7/23/2025 0736  Last data filed at 7/23/2025 0540  Gross per 24 hour   Intake 2799 ml   Output 305 ml   Net 2494 ml     UOP: 850 overnight, 70 cc/h  Ostomy: 10 cc/12h  LLQ Jason Drain: 45 cc/12h     Physical Exam:  Gen: A&Ox3, NAD  CV: normal rate  Pulm: LCTAB, normal respiratory effort  Abd: soft, non-distended, appropriately tender to palpation without rebound or guarding. VMLI w/ island dressing in place, stable shadowing, LLQ Jason drain w/ serosanguinous output, ileostomy pink/healthy w/ sweating   Ext: no peripheral edema, SCDs in place  : Belle in place draining clear dark yellow urine    Laboratory:  Recent Labs   Lab 07/21/25  1230 07/21/25  1932 07/22/25  0433   WBC 5.93 15.05* 17.66*   HGB 8.7* 7.9* 7.5*   HCT 30.0* 26.2* 25.8*   MCV 92 90 94     --  382       Recent Labs   Lab 07/21/25 2055 07/21/25  2100 07/22/25  0433 07/23/25  0612    135* 134* 137   K 4.1 4.1 4.3 4.1    106 105 110   CO2 21* 21* 18* 17*   BUN 12 12 13 11   CREATININE 1.3 1.3 1.3 1.4   GLU  133* 131* 109 95   PROT 6.9 7.0  --   --    BILITOT 0.5 0.5  --   --    ALKPHOS 54 55  --   --    ALT 6* 5*  --   --    AST 9* 10*  --   --    MG 1.9 1.9 1.8 2.2   PHOS 5.9* 5.9* 5.8*  --        Diagnostic Results:  Path pending     ASSESSMENT/PLAN:     Active Hospital Problems    Diagnosis  POA    *Bladder fistula [N32.2]  Yes    Hypercoagulopathy [D68.59]  Yes    Hyponatremia [E87.1]  Yes    BMI 27.0-27.9,adult [Z68.27]  Not Applicable    Weakness [R53.1]  No    Belle catheter in place [Z97.8]  Not Applicable    Anemia [D64.9]  Yes    Essential hypertension [I10]  Yes    Hypoalbuminemia [E88.09]  Yes    Congestive heart failure [I50.9]  Yes    Diverticulitis [K57.92]  Yes    Leukocytosis [D72.829]  Yes      Resolved Hospital Problems   No resolved problems to display.     Assessment: 60 y.o. F POD#2 s/p Ex Lap/Supracervical Hyst/BSO/Sigmoid Colectomy/Ileocolic Resection/Omental Wrap/Ileostomy/Flex Sig/Ureteral Stents/Cysto for the treatment of Complicated Diverticulitis w/ Colouterine Fistula.    Plan:   1. Post-op   - Routine post-op advances  - Continue PRN pain medications   - Encourage ambulation   - Encourage IS  - CBC stable at 7.5/25.8  - CMP w/ stable Cr 1.4, Na 137, Mag 2.2, Phos 5.8  - UOP adequate 70cc/h  - Maintain belle per urology recommendations  - Jason drain w/ 45 cc/12h serosanguinous output  - Ostomy in place with sweating, starting to become productive  - Low residue diet, ADAT per primary team  - Continue zosyn x4 days  - Antiemetics prn nausea/vomiting  - PT/OT/Ostomy consulted  - VTE ppx: SCDs + Lovenox    Anemia  - CBC stable  - Asymptomatic    Hx BL DVTs  - Holding home Eliquis  - VTE ppx: SCDs + Lovenox    HFpEF  - Echo 10/2021 w/ EF 55%  - Asymptomatic     Dispo: Per primary team. Gyn Onc will sign off at this time, please contact the on-call pager with any questions or concerns.     Jasmine Pires MD  Obstetrics & Gynecology, PGY-1

## 2025-07-24 VITALS
BODY MASS INDEX: 27.25 KG/M2 | HEART RATE: 72 BPM | WEIGHT: 163.56 LBS | SYSTOLIC BLOOD PRESSURE: 140 MMHG | DIASTOLIC BLOOD PRESSURE: 60 MMHG | OXYGEN SATURATION: 100 % | RESPIRATION RATE: 16 BRPM | TEMPERATURE: 98 F | HEIGHT: 65 IN

## 2025-07-24 LAB — CRP SERPL-MCNC: 182.3 MG/L

## 2025-07-24 PROCEDURE — 25000003 PHARM REV CODE 250: Performed by: STUDENT IN AN ORGANIZED HEALTH CARE EDUCATION/TRAINING PROGRAM

## 2025-07-24 PROCEDURE — 86140 C-REACTIVE PROTEIN: CPT | Performed by: STUDENT IN AN ORGANIZED HEALTH CARE EDUCATION/TRAINING PROGRAM

## 2025-07-24 PROCEDURE — 63600175 PHARM REV CODE 636 W HCPCS: Performed by: STUDENT IN AN ORGANIZED HEALTH CARE EDUCATION/TRAINING PROGRAM

## 2025-07-24 PROCEDURE — 36415 COLL VENOUS BLD VENIPUNCTURE: CPT | Performed by: STUDENT IN AN ORGANIZED HEALTH CARE EDUCATION/TRAINING PROGRAM

## 2025-07-24 PROCEDURE — 25000003 PHARM REV CODE 250

## 2025-07-24 RX ORDER — GABAPENTIN 300 MG/1
300 CAPSULE ORAL 3 TIMES DAILY
Qty: 90 CAPSULE | Refills: 0 | Status: SHIPPED | OUTPATIENT
Start: 2025-07-24 | End: 2025-08-23

## 2025-07-24 RX ORDER — LOPERAMIDE HYDROCHLORIDE 2 MG/1
2 CAPSULE ORAL 4 TIMES DAILY PRN
Qty: 120 CAPSULE | Refills: 0 | Status: SHIPPED | OUTPATIENT
Start: 2025-07-24 | End: 2025-08-23

## 2025-07-24 RX ORDER — METHOCARBAMOL 500 MG/1
500 TABLET, FILM COATED ORAL 3 TIMES DAILY
Qty: 30 TABLET | Refills: 0 | Status: SHIPPED | OUTPATIENT
Start: 2025-07-24 | End: 2025-08-03

## 2025-07-24 RX ADMIN — IBUPROFEN 800 MG: 400 TABLET ORAL at 05:07

## 2025-07-24 RX ADMIN — ASPIRIN 81 MG CHEWABLE TABLET 81 MG: 81 TABLET CHEWABLE at 09:07

## 2025-07-24 RX ADMIN — METHOCARBAMOL 500 MG: 500 TABLET ORAL at 09:07

## 2025-07-24 RX ADMIN — GABAPENTIN 300 MG: 300 CAPSULE ORAL at 09:07

## 2025-07-24 RX ADMIN — PIPERACILLIN SODIUM AND TAZOBACTAM SODIUM 4.5 G: 4; .5 INJECTION, POWDER, LYOPHILIZED, FOR SOLUTION INTRAVENOUS at 02:07

## 2025-07-24 RX ADMIN — ACETAMINOPHEN 1000 MG: 500 TABLET ORAL at 05:07

## 2025-07-24 RX ADMIN — PIPERACILLIN SODIUM AND TAZOBACTAM SODIUM 4.5 G: 4; .5 INJECTION, POWDER, LYOPHILIZED, FOR SOLUTION INTRAVENOUS at 09:07

## 2025-07-24 RX ADMIN — OXYCODONE HYDROCHLORIDE 10 MG: 10 TABLET ORAL at 03:07

## 2025-07-24 NOTE — CONSULTS
[facility] - [facility]    Wound Care     Patient Name:  Lorraine Robb  MRN:  [id number]  Date: 7/24/2025  Diagnosis: Bladder fistula     History:  Past Medical History:   Diagnosis Date    Diverticulitis     Hypertension      Social History[1]  Precautions:  Allergies as of 03/14/2025 - Reviewed 03/14/2025   Allergen Reaction Noted    Flagyl [metronidazole hcl] Itching and Dermatitis 07/07/2015       WO Assessment Details / Treatment:    Patient seen for wound care: Follow-up   Chart reviewed for this encounter.   Labs:   WBC (K/uL)   Date Value   07/22/2025 17.66 (H)   07/21/2025 15.05 (H)     Glucose (mg/dL)   Date Value   07/23/2025 95   07/22/2025 109   03/15/2025 86   03/14/2025 110     Albumin (g/dL)   Date Value   07/21/2025 2.4 (L)   07/21/2025 2.4 (L)   03/15/2025 2.3 (L)   03/14/2025 2.3 (L)     Clay Score: 21    Narrative:  Pt seen for WC / ostomy follow-up and agreed to assessment  Chart reviewed for this encounter.   See Flow Sheet for additional documentation and media.    Pt sitting in bedside chair with daughter at bedside, pt reports she is to be d/c today.  Supplies given to pt for d/c, all questions answered.   No further education at this time.    RECOMMENDATIONS:  Bedside nurse assess for acute changes (purulence, increased redness/swelling, increased drainage, malodor, increased pain, pallor, necrosis) please contact physician on any acute changes.    Discussed POC with patient and primary nurse.   See EMR for orders & patient education.    Bedside nursing to continue care, dressing changes, & continue monitoring.  Bedside nursing to maintain pressure injury prevention interventions, (PIP).     Recommendations made to primary team for above plan.    Thank you for the consult. Wound Care will continue to follow.   07/24/25 1100   WOCN Assessment   WOCN Total Time (mins) 30   Visit Date 07/24/25   Visit Time 1100   Consult Type Follow Up   WOCN Speciality Ostomy   WOCN List ileostomy   Wound  surgical   Ostomy Type Ileostomy   Intervention chart review;assessed   Teaching discharge        Ileostomy 25 RLQ   Placement Date/Time: 25   Present Prior to Hospital Arrival?: No  Location: RLQ   Stoma Appearance round;rosebud appearance;moist;protruding above skin level   Appliance 1-piece;no leakage;convexity   Stoma Function stool;flatus                          [1]   Social History  Socioeconomic History    Marital status:    Tobacco Use    Smoking status: Former     Current packs/day: 0.00     Types: Cigarettes     Quit date: 2017     Years since quittin.5     Passive exposure: Past    Smokeless tobacco: Never   Substance and Sexual Activity    Alcohol use: No    Drug use: Not Currently    Sexual activity: Not Currently     Social Drivers of Health     Financial Resource Strain: Low Risk  (2025)    Overall Financial Resource Strain (CARDIA)     Difficulty of Paying Living Expenses: Not very hard   Food Insecurity: No Food Insecurity (2025)    Hunger Vital Sign     Worried About Running Out of Food in the Last Year: Never true     Ran Out of Food in the Last Year: Never true   Transportation Needs: No Transportation Needs (2025)    PRAPARE - Transportation     Lack of Transportation (Medical): No     Lack of Transportation (Non-Medical): No   Physical Activity: Sufficiently Active (4/10/2025)    Exercise Vital Sign     Days of Exercise per Week: 4 days     Minutes of Exercise per Session: 60 min   Recent Concern: Physical Activity - Insufficiently Active (3/14/2025)    Exercise Vital Sign     Days of Exercise per Week: 7 days     Minutes of Exercise per Session: 20 min   Stress: Patient Unable To Answer (2025)    [employer] [place] [employer] - Occupational Stress Questionnaire     Feeling of Stress : Patient unable to answer   Housing Stability: Low Risk  (2025)    Housing Stability Vital Sign     Unable to Pay for Housing in the Last  Year: No     Homeless in the Last Year: No

## 2025-07-24 NOTE — PLAN OF CARE
07/24/25 1204   Rounds   Attendance ;Assigned nurse;Pharmacist  ([employer] ELSY)   Discharge Plan A Home with family   Why the patient remains in the hospital Other (see comment)  (d/c today 07/24)   Transition of Care Barriers None     Betty Art CD, MSW, LMSW, RSW   Case Management  [facility]  Email: [email or web address]

## 2025-07-24 NOTE — DISCHARGE SUMMARY
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   Course is as follows:  03/13/2025: She was admitted to the hospital for evaluation for pneumaturia.  CT scan with sigmoid colon to uterine to bladder fistula.  Chronically hypoalbuminemic with albumin of 2.3.  04/11/2025:  Presents for preoperative visit.  Continues to have pneumaturia but no fecal urea.  She is having 2 bowel movements per day.  No issues with fecal incontinence.  Her weight has been stable.  Continues to be active.  Nonsmoker.  No family history of colorectal cancer.  Does complain of right leg pain with standing.  7/1/2025:  Plan for colectomy was postponed secondary to acute bilateral DVTs treated with 3 months of anticoagulation.  She completed her Eliquis and has stopped 3 weeks ago.  She has been on low-dose aspirin ever since.  She is doing well.  No further leg pain.  She is active.  No abdominal pain.  No urinary symptoms or vaginal symptoms.  She is active and walks 5 miles daily.  Has returned to work. Will recheck her blood work today. If her nutrition has improved, we will be able to avoid ostomy creation. We re-reviewed sigmoid colectomy as well as hysterectomy and repair of the bladder fistula. Will plan to start with bilateral ureteral catheters. All questions answered.       Procedure(s) (LRB):  COLECTOMY, SIGMOID, ERAS low (N/A)  SIGMOIDOSCOPY, FLEXIBLE (N/A)  HYSTERECTOMY, SUPRACERVICAL  (Bilateral)  CYSTOSCOPY,WITH URETERAL CATHETER INSERTION (Bilateral)  SALPINGO-OOPHORECTOMY, OPEN, BILATERAL  ILEOCOLIC RESECTION (N/A)  MOBILIZATION, SPLENIC FLEXURE  WRAP-OMENTAL  CREATION, ILEOSTOMY     Hospital Course:  60 y.o. 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  This was associated with significant malnutrition and hypoalbuminemia.  She presents for elective resection.   7/21: PROCEDURE PERFORMED:   Open sigmoid colectomy  Ileocolic resection  Mobilization of the splenic flexure  Pedicled omental wrap to the pelvis  Flexible sigmoidoscopy  Ileostomy creation  Total abdominal hysterectomy and bilateral salpingo-oophorectomy courtesy of gyn Oncology (Dr. Mackenzie)  PROCEDURE PERFORMED:  1.  Cystoscopy with bilateral ureteral catheter placement  7/22: Ambulating. +N/+V with breakfast. Pain controlled. +minimal liquid BM,+gas. Jean Baptiste. MELINA to bulb suction. Zosyn.   7/23: Ambulating. Nausea without vomiting. Pain controlled. +minimal liquid BM, +gas. Jean Baptiste. MELINA to bulb suction. Zosyn.   7/24: Ambulating, tolerating diet without nausea or vomiting, voids spontaneously, pain controlled on PO medications, having bowel movements, passing flatus, hemodynamically stable, afebrile, surgical incisions healing well without signs of infection. Comfortable with ostomy care and bag changes. MELINA drain removed and gauze/tape dressing applied.  Reviewed ileostomy output package, patient had the opportunity to ask questions, all questions and concerns were answered, and patient voiced understanding. Follow-up arranged. Discharge home.       Goals of Care Treatment Preferences:  Code Status: Full Code      Consults (From admission, onward)          Status Ordering Provider     Inpatient consult to Midline team  Once        Provider:  (Not yet assigned)    Completed DAVID MOORE            Significant Diagnostic Studies: Labs: BMP:   Recent Labs   Lab 07/23/25  0612   GLU 95      K  "4.1      CO2 17*   BUN 11   CREATININE 1.4   CALCIUM 8.7   MG 2.2    and CBC No results for input(s): "WBC", "HGB", "HCT", "PLT" in the last 48 hours.    Pending Diagnostic Studies:       None          Final Active Diagnoses:    Diagnosis Date Noted POA    PRINCIPAL PROBLEM:  Bladder fistula [N32.2] 03/12/2025 Yes    History of creation of ostomy [Z93.9] 07/23/2025 Not Applicable    Hypercoagulopathy [D68.59] 07/22/2025 Yes    Hyponatremia [E87.1] 07/22/2025 Yes    BMI 27.0-27.9,adult [Z68.27] 07/22/2025 Not Applicable    Weakness [R53.1] 07/22/2025 No    Belle catheter in place [Z97.8] 07/22/2025 Not Applicable    Anemia [D64.9] 07/22/2025 Yes    Essential hypertension [I10] 10/08/2021 Yes    Hypoalbuminemia [E88.09] 10/08/2021 Yes    Congestive heart failure [I50.9]  Yes    Diverticulitis [K57.92] 10/05/2021 Yes    Leukocytosis [D72.829] 10/05/2021 Yes      Problems Resolved During this Admission:      Discharged Condition: good    Disposition: Home or Self Care    Follow Up:   Follow-up Information       DAVE Stone MD Follow up on 8/12/2025.    Specialty: Colon and Rectal Surgery  Contact information:  [address] [facility] [address]  [phone]               Zoe Mackenzie MD Follow up on 8/19/2025.    Specialty: Gynecologic Oncology  Contact information:  [address] [facility] [address]  [phone]               DAVE Stone MD Follow up on 8/5/2025.    Specialty: Colon and Rectal Surgery  Why: A cystogram has been ordered for you to complete on August 4th so that your belle can be discontinued. If the results are favorable, then you will be seen in the clinic the next day on August 5th so that your belle can be discontinued. Please call the clinic to confirm this appointment if you do not hear from them in the next 5 days.  Contact information:  [address] [facility] [address]  [phone]                           Patient Instructions:    "   FL Cystogram Minimum 3 Views (XPD) - Rad Performed   Standing Status: Future Standing Exp. Date: 08/07/25     Order Specific Question Answer Comments   Reason for Exam: Eval for post-op injury before belle discontinued.    May the Radiologist modify the order per protocol to meet the clinical needs of the patient? Yes    Release to patient Immediate      ACCEPT - Ambulatory referral/consult to Heart Failure [facility]   Standing Status: Future   Referral Priority: Routine Referral Type: Consultation   Referral Reason: Specialty Services Required   Requested Specialty: Cardiology   Number of Visits Requested: 1     Diet Adult Regular     Lifting restrictions   Order Comments: Do not lift anything greater than 10 pounds for 6 weeks.     Pelvic Rest     Notify your health care provider if you experience any of the following:  temperature >100.4     Notify your health care provider if you experience any of the following:  persistent nausea and vomiting or diarrhea     Notify your health care provider if you experience any of the following:  severe uncontrolled pain     Notify your health care provider if you experience any of the following:  redness, tenderness, or signs of infection (pain, swelling, redness, odor or green/yellow discharge around incision site)     Notify your health care provider if you experience any of the following:  difficulty breathing or increased cough     Notify your health care provider if you experience any of the following:  severe persistent headache     Notify your health care provider if you experience any of the following:  worsening rash     Notify your health care provider if you experience any of the following:  persistent dizziness, light-headedness, or visual disturbances     Notify your health care provider if you experience any of the following:  increased confusion or weakness     Notify your health care provider if you experience any of the following:   Order  Comments: Ileostomy output greater than 1.2 liters in 24 hours.  Remember to follow the ileostomy output sheet that you were given.     No dressing needed   Order Comments: Shower only.  Do not take a bath or submerge surgical incisions in water.     Medications:  Reconciled Home Medications:      Medication List        START taking these medications      gabapentin 300 MG capsule  Commonly known as: NEURONTIN  Take 1 capsule (300 mg total) by mouth 3 (three) times daily.     loperamide 2 mg capsule  Commonly known as: IMODIUM  Take 1 capsule (2 mg total) by mouth 4 (four) times daily as needed for Diarrhea (Ileostomy output > 1.2 L).     methocarbamoL 500 MG Tab  Commonly known as: Robaxin  Take 1 tablet (500 mg total) by mouth 3 (three) times daily. for 10 days            CONTINUE taking these medications      aspirin 81 MG Chew  Take 81 mg by mouth once daily.     celecoxib 200 MG capsule  Commonly known as: CeleBREX  Take 200 mg by mouth 2 (two) times daily.     HYDROcodone-acetaminophen  mg per tablet  Commonly known as: NORCO  Take 1 tablet by mouth daily as needed.     multivitamin Tab  Take 1 tablet by mouth once daily.            ASK your doctor about these medications      ELIQUIS DVT-PE TREAT 30D START 5 mg (74 tabs) Dspk  Generic drug: apixaban  For the first 7 days take two 5 mg tablets twice daily.  After 7 days take one 5 mg tablet twice daily.              PRETTY Sevilla-MAGDALENA  Colorectal Surgery  [facility]

## 2025-07-24 NOTE — ASSESSMENT & PLAN NOTE
60 y.o. 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  This was associated with significant malnutrition and hypoalbuminemia. She is now s/p open sig colectomy with ileostomy creation and FALLON/WYATT courtesy of Gyn-Onc performed on 7/21.    Ambulating, tolerating diet without nausea or vomiting, voids spontaneously, pain controlled on PO medications, having bowel movements, passing flatus, hemodynamically stable, afebrile, surgical incisions healing well without signs of infection. Comfortable with ostomy care and bag changes. Follow-up arranged. Discharge home.

## 2025-07-24 NOTE — DISCHARGE INSTRUCTIONS
"Discharge Instructions After Abdominal Operation:     You had a open sigmoid colectomy, ileocolic resection, and total abdominal hysterectomy with bilateral salpingo-oophorectomy performed on 7/21/2025     Pain Control: It is expected to have pain after surgery, but this should improve over the next several weeks. You may be prescribed a narcotic pain medication and/or a muscle relaxer on discharge. You can take this medication as prescribed if the pain is severe but try to decrease the frequency at which you use the narcotic over the initial two (2) weeks.  You may find you need less narcotic pain medication if you combine or stagger it with Tylenol® (acetaminophen) and/or Advil® (ibuprofen). For example, you may take Tylenol 1000mg, then take Advil 600mg 3 hours later, then repeat Tylenol 3 hours after the Advil, and so on.  You should not take more than 4000 mg of Tylenol® or 3200 mg of Advil® in a 24-hour period.  Patients with IBD (crohns or UC) should not take NSAIDs like ibuprofen.  Narcotics and muscle relaxers can cause drowsiness, so sometimes it is helpful to take before bed for a more comfortable rest, but you CANNOT drive, operate machinery, or do mentally important work while on this medication.  Narcotics cause painful constipation - see below on how to prevent this or what to take if this happens.    Medications:  Okay to restart your other home meds.    Wound Care: The incisions can be left open to air unless there is drainage, in which case you should cover the wound with a dry, clean bandage or piece of gauze. Change the dressing 1-2 times each day as needed to maintain a relatively dry dressing. You may have "skin glue" covering your incisions which will peel/crumble off on its own over the next week or two. If you have staples in place, staples are removed in 2-3 weeks by a nurse or physician.  You should shower every day without a dressing on the incisions and let the water run over the " incisions. Do not soak in a bathtub, hot tub or pool until the incisions are completely healed, which usually takes ~4-6 weeks.  You should care for your ostomy as you were taught.    Bowel Function: Diarrhea and loose stool are normal and expected after intestinal surgery. Bowel function initially tends to be erratic (increased frequency, gas, liquid/loose consistency, seepage, urgency), but it will improve over the next several months as your body adjusts to the surgical changes.    Diet: Try some of the following tips:  Eat several (4-6) small meals each day.  If you experience difficulty eating, add in supplemental drinks (Boost®, Ensure®, Glucerna®).  If you are having loose stools, your diet should include foods to add bulk to the stool such as applesauce, bananas, cheese, peanut butter, pasta, and potatoes.  Follow a Low Fiber Diet for about two weeks then add more fiber in your diet. High fiber foods including the following:  Raw vegetables, beans, corn, mushrooms, legumes, peas, potato skins, sauerkraut, stewed tomatoes, brussels sprouts  Fresh fruit, dried fruit (such as raisins or prunes), coconut, juices with pulp. (It is okay to eat fruits such as bananas, melons, canned fruits, and avocado.)  Meat with casings (such as hot dogs, kielbasa, sausage), shellfish  Nuts, popcorn, seeds, chunky peanut butter  Coarse whole grains including breads/rolls with nuts, cereals with nuts, coarse whole grains, poppy, sesame seeds    Activity: Walking is encouraged after surgery. Light aerobic activity such as climbing stairs or leisurely bike riding is acceptable but listen to your body and let pain be your guide when reintroducing activity. If it hurts, don't do it. Avoid the following activities for six (6) weeks after surgery:  Lifting objects over 10 pounds  Strenuous activity such as press-ups, push-ups, crunches, sit-ups, vigorous pulling/pushing, and repetitive twisting or bending    Driving: You should not  drive a vehicle for the two (2) weeks after surgery or while taking narcotic pain medications. When you return to driving, sit in your vehicle without starting the ignition and step on the brake firmly and rapidly a few times to assure you are confident with this task. Do not go alone the first time you drive.    Potential Problems:   Constipation is common when taking narcotics. You may feel full and may have gas pains. Drink plenty of non-caffeinated, non-alcoholic beverages and walk as much as possible. You can add a capful of miralax 1-2 times daily with liquids.  If this doesn't help, take senna 2 tablets 1-2 times a day, or docusate 200mg twice daily. Lastly, try milk of magnesium one dose a day.    Bowel obstruction or ileus is characterized by persistent abdominal cramps, bloating, constipation, nausea, or vomiting. If the symptoms are mild, you should restrict your dietary intake to only liquids, and avoid solid food for 2-3 days. If the symptoms are more severe or persist beyond 24 hours, please call your surgeon's office for advice.    Frequent stools and loose stools are best managed by using bulking agents or anti-diarrheal medication. Please call your surgeon if diarrhea is not improving after a few weeks to discuss starting one of the medications below.  Benefiber®, Citrucel®, Fibercon®, Konsyl®, and Metamucil® are bulking agents that are available at most grocery stores and pharmacies. The medication should be mixed using one (1) teaspoon of the powder in the minimum amount of fluid required to dissolve the agent and taken 1-2 times each day. Benefiber® can be alternatively sprinkled over food 1-2 times each day.  Imodium® (loperamide) is also available without a prescription. It is most effective if taken before meals. You should not take more than eight (8) tablets (32 mg) of Imodium in a 24-hour period. Please call your surgeon's office if you start taking Imodium®.     Dehydration can commonly  occur, and its symptoms or signs include dark urine, dizziness when standing, dry mouth, increased heart rate, leg cramps, and low volumes (less than 800 ml) of urine. If these occur, you should immediately call your surgeon's office. To avoid dehydration, you should do the following:  Drink a variety of fluids. Use an oral rehydration salt solution like Pedialyte, G2 Gatorade, Nuun tabs, etc. and sip the solution between meals.  Eat salty foods or add salt to your food.  Use an anti-diarrheal medication or bulking agent as previously instructed by your surgeon.     Wound infection can occur after any surgery and is characterized by increased drainage of cloudy fluid, odor, pain around the wound, redness of the skin around the wound extending 2-3 fingerbreadths outward, or temperature greater than 101 degrees. Please immediately call your surgeon's office if you begin experiencing these symptoms or signs.    Follow up:  Return to clinic in about 2 weeks for follow up and wound check. Call [phone] for worsening pain, inability to urinate, or fever > 101.  Call or schedule follow up in about 7 days via [employer] if you are not otherwise contacted or see an appointment in the portal.    Please take imodium as needed to slow ileostomy output if it is too loose and approaching >1.2L daily. Maintain oral hydration. Follow instructions given on your ileostomy output handout.

## 2025-07-24 NOTE — OP NOTE
Dany Gaitan - [facility] [place]  General Surgery  Operative Note    SUMMARY     Date of Procedure: 7/21/2025     Procedure: Procedure(s) (LRB):  COLECTOMY, SIGMOID, ERAS low (N/A)  SIGMOIDOSCOPY, FLEXIBLE (N/A)  HYSTERECTOMY, SUPRACERVICAL (Bilateral)  CYSTOSCOPY,WITH URETERAL CATHETER INSERTION (Bilateral)  SALPINGO-OOPHORECTOMY, OPEN, BILATERAL  ILEOCOLIC RESECTION (N/A)  MOBILIZATION, SPLENIC FLEXURE  WRAP-OMENTAL  CREATION, ILEOSTOMY       Surgeons and Role:  Panel 1:     * DAVE Stone MD - Primary     * Rober Macedo MD - Fellow  Panel 2:     * Zoe Mackenzie MD - Primary  Panel 3:     * Aries Chawla MD - Primary     * Denis Hanna MD - Resident - Assisting     * Daren Jacobson MD - Resident - Assisting        Pre-Operative Diagnosis: Bladder fistula [N32.2]  Diverticulitis [K57.92]    Post-Operative Diagnosis: Post-Op Diagnosis Codes:     * Bladder fistula [N32.2]     * Diverticulitis [K57.92]    Anesthesia: General    Operative Findings (including complications, if any): Uterus with multiple leiomyoma, chronically inflamed bilateral adnexa, dense pelvic adhesive disease with large rind within the deep pelvis. We elected for supracervical hysterectomy given the findings.     Description of Technical Procedures: Patient had been taken to the operating room by the CRS team and abdomen was opened with bowel packed away with moist laparotomy sponges. Anatomy was distorted due to chronic abscess and inflammation. We work to restore normal anatomy by freeing the uterine fundus from the peritoneal attachments. Bilateral round ligaments were identified and transected. The retroperitoneum was opened and bilateral ureters identified. We were able to isolate the bilateral IP ligaments and these were cauterized and transected using the ligasure device. We further dissected the adhesive disease of the deep pelvis to mobilize the uterus. We were able to then identify the cervicovaginal junction with the use of an  EEA sizer within the vagina. Given the inflammatory changes we felt it was best to perform a supracervical hysterectomy with the use of electrocautery. The case was then returned to the CRS team.     Estimated Blood Loss (EBL): 200cc    Implants:   Implant Name Type Inv. Item Serial No.  Lot No. LRB No. Used Action   MEMBRANE SEPRAFILM 5 X 6 - [id number]  MEMBRANE SEPRAFILM 5 X 6  [employer] [id number]  1 Implanted       Specimens:   Uterus  Right fallopian tube and ovary  Left fallopian tube and ovary              Condition: Good    Disposition: PACU - hemodynamically stable.    Attestation: I was present and scrubbed for the entire procedure.

## 2025-07-24 NOTE — PLAN OF CARE
Dany Annay - [facility]  Discharge Final Note    Primary Care Provider: Pardeep Kay MD    Expected Discharge Date: 7/24/2025    Pt d/c to home with no needs.  Daughter to provide transportation at d/c for pt.      Pt cleared for d/c from CM perspective     Future Appointments   Date Time Provider Department Center   8/12/2025  9:40 AM DAVE Stone MD [facility] COLSURG [facility]as [facility]   8/19/2025 10:45 AM Zoe Mackenzie MD [facility] GYN ONC Dany [facility]   9/2/2025 10:45 AM Zoe Mackenzie MD [facility] GYN ONC [facility]   ]    Final Discharge Note (most recent)       Final Note - 07/24/25 1202          Final Note    Assessment Type Final Discharge Note (P)      Anticipated Discharge Disposition Home or Self Care (P)         Post-Acute Status    Post-Acute Authorization Other (P)      Other Status No Post-Acute Service Needs (P)      Discharge Delays None known at this time (P)                    Discharge Plan A and Plan B have been determined by review of patient's clinical status, future medical and therapeutic needs, and coverage/benefits for post-acute care in coordination with multidisciplinary team members.    Betty Art, ELYSIA, MSW, LMSW, RSW   Case Management  [facility]  Email: [email or web address]      Important Message from Medicare             Contact Info       DAVE Stone MD   Specialty: Colon and Rectal Surgery    [address] [place] [address]   Phone: [phone]       Next Steps: Follow up on 8/12/2025    Zoe Mackenzie MD   Specialty: Gynecologic Oncology    [address] [place] [address]   Phone: [phone]       Next Steps: Follow up on 8/19/2025    DAVE Stone MD   Specialty: Colon and Rectal Surgery    [address] [place] [address]   Phone: [phone]       Next Steps: Follow up on 8/5/2025    Instructions: A cystogram has been ordered for you to complete on August 4th so that your belle can be discontinued. If the  results are favorable, then you will be seen in the clinic the next day on August 5th so that your belle can be discontinued. Please call the clinic to confirm this appointment if you do not hear from them in the next 5 days.

## 2025-07-28 NOTE — PHYSICIAN QUERY
Please specify the diagnosis associated with the clinical findings.     Acute kidney failure/injury with tubular necrosis

## 2025-07-30 LAB
ESTROGEN SERPL-MCNC: NORMAL PG/ML
INSULIN SERPL-ACNC: NORMAL U[IU]/ML
LAB AP CLINICAL INFORMATION: NORMAL
LAB AP GROSS DESCRIPTION: NORMAL
LAB AP PERFORMING LOCATION(S): NORMAL
LAB AP REPORT FOOTNOTES: NORMAL

## 2025-08-01 ENCOUNTER — EPISODE CHANGES (OUTPATIENT)
Dept: CARDIOLOGY | Facility: CLINIC | Age: 60
End: 2025-08-01

## 2025-08-01 ENCOUNTER — DOCUMENTATION ONLY (OUTPATIENT)
Dept: CARDIOLOGY | Facility: CLINIC | Age: 60
End: 2025-08-01
Payer: MEDICAID

## 2025-08-01 NOTE — PROGRESS NOTES
[facility] (H[facility]) Team notified of pt referral via Ambulatory Referral to Heart Failure Transitional Care ([id number]).    Patient screened today by provider and RN for enrollment to program.      Pt was deemed not a candidate for enrollment at this time related to patient current admission diagnosis/ problem will not benefit from the Heart Failure Transitional Care Program.PP bladder fistula     Pt will require additional follow up planning per primary team.     If pt status, diagnosis, or treatment plan changes , please place AMB referral to [facility] ([id number]) for HFTCC enrollment re-evalution.

## 2025-08-04 ENCOUNTER — HOSPITAL ENCOUNTER (OUTPATIENT)
Dept: RADIOLOGY | Facility: HOSPITAL | Age: 60
Discharge: HOME | End: 2025-08-04
Payer: MEDICAID

## 2025-08-04 ENCOUNTER — OFFICE VISIT (OUTPATIENT)
Dept: SURGERY | Facility: CLINIC | Age: 60
End: 2025-08-04
Payer: MEDICAID

## 2025-08-04 VITALS
HEART RATE: 93 BPM | WEIGHT: 149.06 LBS | SYSTOLIC BLOOD PRESSURE: 141 MMHG | BODY MASS INDEX: 24.83 KG/M2 | DIASTOLIC BLOOD PRESSURE: 83 MMHG | RESPIRATION RATE: 16 BRPM | HEIGHT: 65 IN

## 2025-08-04 DIAGNOSIS — K57.92 DIVERTICULITIS: Primary | ICD-10-CM

## 2025-08-04 DIAGNOSIS — K57.92 DIVERTICULITIS: ICD-10-CM

## 2025-08-04 PROCEDURE — 99024 POSTOP FOLLOW-UP VISIT: CPT | Mod: ,,, | Performed by: COLON & RECTAL SURGERY

## 2025-08-04 PROCEDURE — 51600 INJECTION FOR BLADDER X-RAY: CPT

## 2025-08-04 PROCEDURE — 25500020 PHARM REV CODE 255

## 2025-08-04 RX ADMIN — DIATRIZOATE MEGLUMINE 230 ML: 180 INJECTION, SOLUTION INTRAVESICAL at 11:08

## 2025-08-04 NOTE — PROGRESS NOTES
No chief complaint on file.        Lorraine Robb is a 60 y.o. female who presents status post sigmoidectomy with colovesicular fistula repair    They have no fevers or chills    Pain is well controlled    Doing well with her ileostomy    Her only complaint is her Jean Baptiste catheter    Past Medical History:   Diagnosis Date    Diverticulitis     Hypertension       Past Surgical History:   Procedure Laterality Date    BILATERAL SALPINGO-OOPHORECTOMY (BSO)  7/21/2025    Procedure: SALPINGO-OOPHORECTOMY, OPEN, BILATERAL;  Surgeon: Zoe Mackenzie MD;  Location: [facility] OR 2ND FLR;  Service: OB/GYN;;    COLECTOMY, SIGMOID N/A 7/21/2025    Procedure: COLECTOMY, SIGMOID, ERAS low;  Surgeon: DAVE Stone MD;  Location: [facility]ND FLR;  Service: Colon and Rectal;  Laterality: N/A;    CYSTOSCOPY,WITH URETERAL CATHETER INSERTION Bilateral 7/21/2025    Procedure: CYSTOSCOPY,WITH URETERAL CATHETER INSERTION;  Surgeon: Aries Chawla MD;  Location: [facility] 2ND FLR;  Service: Urology;  Laterality: Bilateral;    FLEXIBLE SIGMOIDOSCOPY N/A 7/21/2025    Procedure: SIGMOIDOSCOPY, FLEXIBLE;  Surgeon: DAVE Stone MD;  Location: [facility] 2ND FLR;  Service: Colon and Rectal;  Laterality: N/A;    ILEOCOLECTOMY N/A 7/21/2025    Procedure: ILEOCOLIC RESECTION;  Surgeon: DAVE Stone MD;  Location: [facility] FLR;  Service: Colon and Rectal;  Laterality: N/A;    ILEOSTOMY  7/21/2025    Procedure: CREATION, ILEOSTOMY;  Surgeon: DAVE Stone MD;  Location: [facility] FLR;  Service: Colon and Rectal;;    MOBILIZATION OF SPLENIC FLEXURE  7/21/2025    Procedure: MOBILIZATION, SPLENIC FLEXURE;  Surgeon: DAVE Stone MD;  Location: [facility] FLR;  Service: Colon and Rectal;;    PARTIAL HYSTERECTOMY Bilateral 7/21/2025    Procedure: HYSTERECTOMY, SUPRACERVICAL;  Surgeon: Zoe Mackenzie MD;  Location: [facility] 2ND FLR;  Service: OB/GYN;  Laterality: Bilateral;    WRAP-OMENTAL  7/21/2025     Procedure: WRAP-OMENTAL;  Surgeon: DAVE Stone MD;  Location: [facility] OR [address] [place] [address];  Service: Colon and Rectal;;      Social History[1]   Family History   Problem Relation Name Age of Onset    Diabetes Mother      Hypertension Mother      Cancer Sister      Diabetes Sister          Scheduled Meds:  Continuous Infusions:  PRN Meds:.     Review of patient's allergies indicates:   Allergen Reactions    Flagyl [metronidazole hcl] Itching and Dermatitis          Review of Systems  Please see review of systems scanned into the chart     There were no vitals taken for this visit.    Incisions healing well, no hernia, right lower quadrant ileostomy in place.  It is pink and functioning         ASSESSMENT:  1. Diverticulitis                PLAN:    No leak on cystogram.  Jean Baptiste catheter removed     She has an appointment with Dr. Stone in 2 weeks.  She is to follow up with that.    Return criteria were given         [1]   Social History  Socioeconomic History    Marital status:    Tobacco Use    Smoking status: Former     Current packs/day: 0.00     Types: Cigarettes     Quit date: 2017     Years since quittin.5     Passive exposure: Past    Smokeless tobacco: Never   Substance and Sexual Activity    Alcohol use: No    Drug use: Not Currently    Sexual activity: Not Currently     Social Drivers of Health     Financial Resource Strain: Low Risk  (2025)    Overall Financial Resource Strain (CARDIA)     Difficulty of Paying Living Expenses: Not very hard   Food Insecurity: No Food Insecurity (2025)    Hunger Vital Sign     Worried About Running Out of Food in the Last Year: Never true     Ran Out of Food in the Last Year: Never true   Transportation Needs: No Transportation Needs (2025)    PRAPARE - Transportation     Lack of Transportation (Medical): No     Lack of Transportation (Non-Medical): No   Physical Activity: Sufficiently Active (4/10/2025)    Exercise Vital Sign     Days of  Exercise per Week: 4 days     Minutes of Exercise per Session: 60 min   Recent Concern: Physical Activity - Insufficiently Active (3/14/2025)    Exercise Vital Sign     Days of Exercise per Week: 7 days     Minutes of Exercise per Session: 20 min   Stress: Patient Unable To Answer (7/22/2025)    [employer] - Occupational Stress Questionnaire     Feeling of Stress : Patient unable to answer   Housing Stability: Low Risk  (7/22/2025)    Housing Stability Vital Sign     Unable to Pay for Housing in the Last Year: No     Homeless in the Last Year: No

## 2025-08-05 ENCOUNTER — DOCUMENTATION ONLY (OUTPATIENT)
Dept: CARDIOLOGY | Facility: CLINIC | Age: 60
End: 2025-08-05
Payer: MEDICAID

## 2025-08-05 ENCOUNTER — EPISODE CHANGES (OUTPATIENT)
Dept: CARDIOLOGY | Facility: CLINIC | Age: 60
End: 2025-08-05

## 2025-08-05 NOTE — PROGRESS NOTES
Heart Failure Transitional Care Clinic (HFTCC) Team notified of pt referral via [facility] (automated inbasket notification) .    Patient screened today8/5/25 by provider and RN for enrollment to program.      Pt was deemed not a candidate for enrollment at this time related to patient current admission diagnosis/ problem will not benefit from the Heart Failure Transitional Care Program.    Pt will require additional follow up planning per primary team.     If pt status, diagnosis, or treatment plan changes , please place AMB referral to Heart Failure [facility]itional [facility] Clinic ([id number]) for HFTCC enrollment re-evalution.

## 2025-08-08 ENCOUNTER — TELEPHONE (OUTPATIENT)
Dept: SURGERY | Facility: CLINIC | Age: 60
End: 2025-08-08
Payer: MEDICAID

## 2025-08-11 ENCOUNTER — TELEPHONE (OUTPATIENT)
Dept: SURGERY | Facility: CLINIC | Age: 60
End: 2025-08-11
Payer: MEDICAID

## 2025-08-11 DIAGNOSIS — K57.92 DIVERTICULITIS: Primary | ICD-10-CM

## 2025-08-12 ENCOUNTER — OFFICE VISIT (OUTPATIENT)
Dept: SURGERY | Facility: CLINIC | Age: 60
End: 2025-08-12
Payer: MEDICAID

## 2025-08-12 ENCOUNTER — OFFICE VISIT (OUTPATIENT)
Dept: WOUND CARE | Facility: CLINIC | Age: 60
End: 2025-08-12
Payer: MEDICAID

## 2025-08-12 VITALS
BODY MASS INDEX: 26.17 KG/M2 | DIASTOLIC BLOOD PRESSURE: 60 MMHG | RESPIRATION RATE: 18 BRPM | WEIGHT: 157.06 LBS | HEART RATE: 91 BPM | SYSTOLIC BLOOD PRESSURE: 135 MMHG | HEIGHT: 65 IN

## 2025-08-12 DIAGNOSIS — K57.92 DIVERTICULITIS: Primary | ICD-10-CM

## 2025-08-12 DIAGNOSIS — L24.9 IRRITANT CONTACT DERMATITIS DUE TO ILEOSTOMY: ICD-10-CM

## 2025-08-12 DIAGNOSIS — Z43.2 ATTENTION TO ILEOSTOMY: Primary | ICD-10-CM

## 2025-08-12 DIAGNOSIS — K94.19 IRRITANT CONTACT DERMATITIS DUE TO ILEOSTOMY: ICD-10-CM

## 2025-08-12 PROCEDURE — 3078F DIAST BP <80 MM HG: CPT | Mod: CPTII,,, | Performed by: COLON & RECTAL SURGERY

## 2025-08-12 PROCEDURE — 99999 PR PBB SHADOW E&M-EST. PATIENT-LVL III: CPT | Mod: PBBFAC,,, | Performed by: COLON & RECTAL SURGERY

## 2025-08-12 PROCEDURE — 99212 OFFICE O/P EST SF 10 MIN: CPT | Mod: PBBFAC | Performed by: CLINICAL NURSE SPECIALIST

## 2025-08-12 PROCEDURE — 1159F MED LIST DOCD IN RCRD: CPT | Mod: CPTII,,, | Performed by: COLON & RECTAL SURGERY

## 2025-08-12 PROCEDURE — 99213 OFFICE O/P EST LOW 20 MIN: CPT | Mod: PBBFAC,27 | Performed by: COLON & RECTAL SURGERY

## 2025-08-12 PROCEDURE — 99024 POSTOP FOLLOW-UP VISIT: CPT | Mod: ,,, | Performed by: COLON & RECTAL SURGERY

## 2025-08-12 PROCEDURE — 99999 PR PBB SHADOW E&M-EST. PATIENT-LVL II: CPT | Mod: PBBFAC,,, | Performed by: CLINICAL NURSE SPECIALIST

## 2025-08-12 PROCEDURE — 1160F RVW MEDS BY RX/DR IN RCRD: CPT | Mod: CPTII,,, | Performed by: COLON & RECTAL SURGERY

## 2025-08-12 PROCEDURE — 3075F SYST BP GE 130 - 139MM HG: CPT | Mod: CPTII,,, | Performed by: COLON & RECTAL SURGERY

## 2025-08-19 ENCOUNTER — TELEPHONE (OUTPATIENT)
Dept: GYNECOLOGIC ONCOLOGY | Facility: CLINIC | Age: 60
End: 2025-08-19
Payer: MEDICAID

## 2025-09-02 ENCOUNTER — OFFICE VISIT (OUTPATIENT)
Dept: GYNECOLOGIC ONCOLOGY | Facility: CLINIC | Age: 60
End: 2025-09-02
Payer: MEDICAID

## 2025-09-02 VITALS
SYSTOLIC BLOOD PRESSURE: 143 MMHG | DIASTOLIC BLOOD PRESSURE: 75 MMHG | WEIGHT: 160.69 LBS | BODY MASS INDEX: 26.74 KG/M2 | HEART RATE: 89 BPM

## 2025-09-02 DIAGNOSIS — N82.4 COLOUTERINE FISTULA: ICD-10-CM

## 2025-09-02 DIAGNOSIS — K57.92 DIVERTICULITIS: Primary | ICD-10-CM

## 2025-09-02 PROCEDURE — 99999 PR PBB SHADOW E&M-EST. PATIENT-LVL III: CPT | Mod: PBBFAC,,, | Performed by: OBSTETRICS & GYNECOLOGY

## 2025-09-02 PROCEDURE — 3078F DIAST BP <80 MM HG: CPT | Mod: CPTII,,, | Performed by: OBSTETRICS & GYNECOLOGY

## 2025-09-02 PROCEDURE — 99024 POSTOP FOLLOW-UP VISIT: CPT | Mod: ,,, | Performed by: OBSTETRICS & GYNECOLOGY

## 2025-09-02 PROCEDURE — 3077F SYST BP >= 140 MM HG: CPT | Mod: CPTII,,, | Performed by: OBSTETRICS & GYNECOLOGY

## 2025-09-02 PROCEDURE — 1159F MED LIST DOCD IN RCRD: CPT | Mod: CPTII,,, | Performed by: OBSTETRICS & GYNECOLOGY

## 2025-09-02 PROCEDURE — 99213 OFFICE O/P EST LOW 20 MIN: CPT | Mod: PBBFAC | Performed by: OBSTETRICS & GYNECOLOGY

## (undated) DEVICE — SUT VICRYL 2-0 36 CT-1

## (undated) DEVICE — DRESSING ABSRBNT ISLAND 3.6X8

## (undated) DEVICE — DRAPE UINDERBUT GRAD PCH

## (undated) DEVICE — TRAY CATH 1-LYR URIMTR 16FR

## (undated) DEVICE — ELECTRODE EXTENDED BLADE

## (undated) DEVICE — STAPLER CONTOUR CRV GRN 40MM

## (undated) DEVICE — GUIDE WIRE MOTION .035 X 150CM

## (undated) DEVICE — Device

## (undated) DEVICE — SUT VICRYL PLUS VIL 0 6-18IN

## (undated) DEVICE — TRAY GENERAL SURGERY OMC

## (undated) DEVICE — DRAPE ABDOMINAL TIBURON 14X11

## (undated) DEVICE — SUT VICRYL PLUS 3-0 SH 27IN

## (undated) DEVICE — DRAPE INCISE IOBAN 2 23X17IN

## (undated) DEVICE — SUT VICRYL COAT ANTI 0 27IN

## (undated) DEVICE — CUTTER PROXIMATE BLUE 75MM

## (undated) DEVICE — CLIPPER BLADE MOD 4406 (CAREF)

## (undated) DEVICE — TIP YANKAUERS BULB NO VENT

## (undated) DEVICE — SUT VICRYL UNDYED BRAID 0 54IN

## (undated) DEVICE — NDL BOX COUNTER

## (undated) DEVICE — SUT 1 48IN PDS II VIO MONO

## (undated) DEVICE — TUBING SUCTION STERILE

## (undated) DEVICE — APPLICATOR CHLORAPREP ORN 26ML

## (undated) DEVICE — SUT 2-0 12-18IN SILK

## (undated) DEVICE — SUT VICRYL PLUS 0 CT1 36IN

## (undated) DEVICE — COVER LIGHT HANDLE 80/CA

## (undated) DEVICE — WIRE GUIDE 0.038OLD

## (undated) DEVICE — TOWEL OR DISP STRL BLUE 4/PK

## (undated) DEVICE — TRAY SKIN SCRUB WET PREMIUM

## (undated) DEVICE — GOWN SURGICAL X-LARGE

## (undated) DEVICE — R SEALER LIGASURE IMPACT 18CM

## (undated) DEVICE — PENCIL SMK EVAC CONNECTOR 10FT

## (undated) DEVICE — SET IRR URLGY 2LINE UNIV SPIKE

## (undated) DEVICE — ELECTRODE REM PLYHSV RETURN 9

## (undated) DEVICE — DRAPE LAP T SHT W/ INSTR PAD

## (undated) DEVICE — SUT MCRYL PLUS 4-0 PS2 27IN

## (undated) DEVICE — SYR 10CC LUER LOCK

## (undated) DEVICE — DRAPE STERI INSTRUMENT 1018

## (undated) DEVICE — SYR 30CC LUER LOCK

## (undated) DEVICE — BOWL STERILE LARGE 32OZ

## (undated) DEVICE — LUBRICANT SURGILUBE 2 OZ

## (undated) DEVICE — COVER MAYO STND XL 30X57IN

## (undated) DEVICE — STAPLER ECHELON PWR CIR 29MM

## (undated) DEVICE — SPONGE LAP 18X18 PREWASHED

## (undated) DEVICE — SOL IRR NACL .9% 3000ML

## (undated) DEVICE — RELOAD PROXIMATE CUT BLUE 75MM

## (undated) DEVICE — LEGGING CLEAR POLY 2/PACK

## (undated) DEVICE — SALINE .45% 1000ML VITEK2

## (undated) DEVICE — SUT PDS PLUS 1 TP1 96IN

## (undated) DEVICE — CATH POLLACK OPEN-END FLEXI-TI

## (undated) DEVICE — DRAPE CORETEMP FLD WRM 56X62IN

## (undated) DEVICE — MARKER SKIN RULER STERILE

## (undated) DEVICE — SUT VICRYL + 2-0 12-18IN

## (undated) DEVICE — SYR IRRIGATION BULB STER 60ML